# Patient Record
Sex: FEMALE | Race: BLACK OR AFRICAN AMERICAN | NOT HISPANIC OR LATINO | Employment: OTHER | URBAN - METROPOLITAN AREA
[De-identification: names, ages, dates, MRNs, and addresses within clinical notes are randomized per-mention and may not be internally consistent; named-entity substitution may affect disease eponyms.]

---

## 2017-04-06 ENCOUNTER — TELEPHONE (OUTPATIENT)
Dept: FAMILY MEDICINE | Facility: CLINIC | Age: 32
End: 2017-04-06

## 2017-04-06 NOTE — TELEPHONE ENCOUNTER
Panel Management Review      BP Readings from Last 1 Encounters:   12/19/16 128/66        Fail List measure: Pap list      Patient is due/failing the following:   PAP and PHYSICAL    Action needed:   Patient needs office visit for physical.    Type of outreach:    Phone, spoke to patient.  scheduled for 4/24/17    Questions for provider review:    None                                                                                                                                    Maggie Peres MA

## 2017-04-24 ENCOUNTER — OFFICE VISIT (OUTPATIENT)
Dept: FAMILY MEDICINE | Facility: CLINIC | Age: 32
End: 2017-04-24
Payer: COMMERCIAL

## 2017-04-24 VITALS
TEMPERATURE: 98.6 F | HEART RATE: 74 BPM | OXYGEN SATURATION: 98 % | BODY MASS INDEX: 19.37 KG/M2 | HEIGHT: 67 IN | WEIGHT: 123.4 LBS | SYSTOLIC BLOOD PRESSURE: 101 MMHG | DIASTOLIC BLOOD PRESSURE: 63 MMHG

## 2017-04-24 DIAGNOSIS — Z30.013 ENCOUNTER FOR INITIAL PRESCRIPTION OF INJECTABLE CONTRACEPTIVE: ICD-10-CM

## 2017-04-24 DIAGNOSIS — Z00.00 ENCOUNTER FOR ROUTINE ADULT HEALTH EXAMINATION WITHOUT ABNORMAL FINDINGS: Primary | ICD-10-CM

## 2017-04-24 DIAGNOSIS — Z11.3 SCREEN FOR STD (SEXUALLY TRANSMITTED DISEASE): ICD-10-CM

## 2017-04-24 DIAGNOSIS — Z12.4 SCREENING FOR MALIGNANT NEOPLASM OF CERVIX: ICD-10-CM

## 2017-04-24 LAB — BETA HCG QUAL IFA URINE: NEGATIVE

## 2017-04-24 PROCEDURE — 96372 THER/PROPH/DIAG INJ SC/IM: CPT | Performed by: FAMILY MEDICINE

## 2017-04-24 PROCEDURE — 36415 COLL VENOUS BLD VENIPUNCTURE: CPT | Performed by: FAMILY MEDICINE

## 2017-04-24 PROCEDURE — 87491 CHLMYD TRACH DNA AMP PROBE: CPT | Performed by: FAMILY MEDICINE

## 2017-04-24 PROCEDURE — 99395 PREV VISIT EST AGE 18-39: CPT | Mod: 25 | Performed by: FAMILY MEDICINE

## 2017-04-24 PROCEDURE — 87340 HEPATITIS B SURFACE AG IA: CPT | Performed by: FAMILY MEDICINE

## 2017-04-24 PROCEDURE — 84703 CHORIONIC GONADOTROPIN ASSAY: CPT | Performed by: FAMILY MEDICINE

## 2017-04-24 PROCEDURE — 87389 HIV-1 AG W/HIV-1&-2 AB AG IA: CPT | Performed by: FAMILY MEDICINE

## 2017-04-24 PROCEDURE — 87591 N.GONORRHOEAE DNA AMP PROB: CPT | Performed by: FAMILY MEDICINE

## 2017-04-24 PROCEDURE — G0145 SCR C/V CYTO,THINLAYER,RESCR: HCPCS | Performed by: FAMILY MEDICINE

## 2017-04-24 PROCEDURE — 86780 TREPONEMA PALLIDUM: CPT | Performed by: FAMILY MEDICINE

## 2017-04-24 PROCEDURE — 86803 HEPATITIS C AB TEST: CPT | Performed by: FAMILY MEDICINE

## 2017-04-24 ASSESSMENT — PAIN SCALES - GENERAL: PAINLEVEL: NO PAIN (0)

## 2017-04-24 NOTE — NURSING NOTE
"Chief Complaint   Patient presents with     Physical       Initial /63 (BP Location: Left arm, Patient Position: Chair, Cuff Size: Adult Regular)  Pulse 74  Temp 98.6  F (37  C) (Oral)  Ht 5' 6.5\" (1.689 m)  Wt 123 lb 6.4 oz (56 kg)  LMP  (LMP Unknown)  SpO2 98%  BMI 19.62 kg/m2 Estimated body mass index is 19.62 kg/(m^2) as calculated from the following:    Height as of this encounter: 5' 6.5\" (1.689 m).    Weight as of this encounter: 123 lb 6.4 oz (56 kg).  Medication Reconciliation: complete   Viviane Pepepr      "

## 2017-04-24 NOTE — PROGRESS NOTES
SUBJECTIVE:     CC: Cruz Obregon is an 31 year old woman who presents for preventive health visit.     Healthy Habits:    Do you get at least three servings of calcium containing foods daily (dairy, green leafy vegetables, etc.)? yes    Amount of exercise or daily activities, outside of work: No    Problems taking medications regularly No    Medication side effects: No    Have you had an eye exam in the past two years? yes    Do you see a dentist twice per year? no    Do you have sleep apnea, excessive snoring or daytime drowsiness? Yes         Contraception - would like to start back on depo due to every 21 days menses without it.  No menses for last 6 months since missed Depo.  Has not been sexually active for the last 3 weeks.    Today's PHQ-2 Score:   PHQ-2 ( 1999 [employer]) 4/24/2017 2/8/2016   Q1: Little interest or pleasure in doing things 0 0   Q2: Feeling down, depressed or hopeless 0 0   PHQ-2 Score 0 0     Abuse: Current or Past(Physical, Sexual or Emotional)- No  Do you feel safe in your environment - Yes    Social History   Substance Use Topics     Smoking status: Never Smoker     Smokeless tobacco: Never Used     Alcohol use No     The patient does not drink >3 drinks per day nor >7 drinks per week.    Recent Labs   Lab Test  11/29/10   1251   CHOL  194   HDL  62   LDL  111   TRIG  106   CHOLHDLRATIO  3.1       Reviewed orders with patient.  Reviewed health maintenance and updated orders accordingly - No    Mammo Decision Support:  Mammogram not appropriate for this patient based on age.    Pertinent mammograms are reviewed under the imaging tab.  History of abnormal Pap smear: NO - age 21-29 PAP every 3 years recommended    Reviewed and updated as needed this visit by clinical staff  Tobacco  Allergies  Meds  Med Hx  Surg Hx  Fam Hx  Soc Hx        Reviewed and updated as needed this visit by Provider            ROS:  C: NEGATIVE for fever, chills, change in weight  I: NEGATIVE for worrisome  "rashes, moles or lesions  E: NEGATIVE for vision changes or irritation  ENT: NEGATIVE for ear, mouth and throat problems  R: NEGATIVE for significant cough or SOB  B: NEGATIVE for masses, tenderness or discharge  CV: NEGATIVE for chest pain, palpitations or peripheral edema  GI: NEGATIVE for nausea, abdominal pain, heartburn, or change in bowel habits  : NEGATIVE for unusual urinary or vaginal symptoms. Periods are regular.  M: NEGATIVE for significant arthralgias or myalgia  N: NEGATIVE for weakness, dizziness or paresthesias  P: NEGATIVE for changes in mood or affect    Problem list, Medication list, Allergies, and Medical/Social/Surgical histories reviewed in [facility] and updated as appropriate.  OBJECTIVE:     /63 (BP Location: Left arm, Patient Position: Chair, Cuff Size: Adult Regular)  Pulse 74  Temp 98.6  F (37  C) (Oral)  Ht 5' 6.5\" (1.689 m)  Wt 123 lb 6.4 oz (56 kg)  LMP  (LMP Unknown)  SpO2 98%  BMI 19.62 kg/m2  EXAM:  GENERAL: healthy, alert and no distress  EYES: Eyes grossly normal to inspection, PERRL and conjunctivae and sclerae normal  HENT: ear canals and TM's normal, nose and mouth without ulcers or lesions  NECK: no adenopathy, no asymmetry, masses, or scars and thyroid normal to palpation  RESP: lungs clear to auscultation - no rales, rhonchi or wheezes  BREAST: normal without masses, tenderness or nipple discharge and no palpable axillary masses or adenopathy  CV: regular rate and rhythm, normal S1 S2, no S3 or S4, no murmur, click or rub, no peripheral edema and peripheral pulses strong  ABDOMEN: soft, nontender, no hepatosplenomegaly, no masses and bowel sounds normal   (female): normal female external genitalia, normal urethral meatus, vaginal mucosa pink, moist, well rugated, and normal cervix/adnexa/uterus without masses or discharge  MS: no gross musculoskeletal defects noted, no edema  SKIN: no suspicious lesions or rashes  NEURO: Normal strength and tone, mentation intact " "and speech normal  PSYCH: mentation appears normal, affect normal/bright    ASSESSMENT/PLAN:     1. Encounter for routine adult health examination without abnormal findings  Routine preventive    2. Screening for malignant neoplasm of cervix  Screening  - Pap imaged thin layer screen reflex to HPV if ASCUS - recommend age 25 - 29    3. Encounter for initial prescription of injectable contraceptive  Restart depo  - INJECTION INTRAMUSCULAR OR SUB-Q; Standing  - MEDROXYPROGESTERONE INJ  - Beta HCG qual IFA urine  - MEDROXYPROGESTERONE INJ    4. Screen for STD (sexually transmitted disease)  Screening  - Chlamydia trachomatis PCR  - Neisseria gonorrhoeae PCR  - Hepatitis C antibody  - Hepatitis B surface antigen  - HIV Antigen Antibody Combo  - Anti Treponema    COUNSELING:   Reviewed preventive health counseling, as reflected in patient instructions         reports that she has never smoked. She has never used smokeless tobacco.    Estimated body mass index is 19.62 kg/(m^2) as calculated from the following:    Height as of this encounter: 5' 6.5\" (1.689 m).    Weight as of this encounter: 123 lb 6.4 oz (56 kg).       Counseling Resources:  ATP IV Guidelines  Pooled Cohorts Equation Calculator  Breast Cancer Risk Calculator  FRAX Risk Assessment  ICSI Preventive Guidelines  Dietary Guidelines for Americans, 2010  [employer]'s MyPlate  ASA Prophylaxis  Lung CA Screening    Lisa Carney MD  [facility]  "

## 2017-04-24 NOTE — NURSING NOTE
The following medication was given:     MEDICATION: Depo Provera 150mg  ROUTE: IM  SITE: [facility]  DOSE: 1ml  LOT #: [id number]  :  Devan VILLASENOR   EXPIRATION DATE:  8/2019  NDC#: [address]-1  Given at 11:20am     Next due 7/10/2017- 7/24/2017    Evie Youssef CMA

## 2017-04-24 NOTE — MR AVS SNAPSHOT
After Visit Summary   4/24/2017    Cruz Obregon    MRN: [id number]           Patient Information     Date Of Birth          1985        Visit Information        Provider Department      4/24/2017 10:20 AM Lisa Montana MD [facility]        Today's Diagnoses     Encounter for routine adult health examination without abnormal findings    -  1    Screening for malignant neoplasm of cervix        Encounter for initial prescription of injectable contraceptive        Screen for STD (sexually transmitted disease)          Care Instructions      Preventive Health Recommendations  Female Ages 26 - 39  Yearly exam:   See your health care provider every year in order to    Review health changes.     Discuss preventive care.      Review your medicines if you your doctor has prescribed any.    Until age 30: Get a Pap test every three years (more often if you have had an abnormal result).    After age 30: Talk to your doctor about whether you should have a Pap test every 3 years or have a Pap test with HPV screening every 5 years.   You do not need a Pap test if your uterus was removed (hysterectomy) and you have not had cancer.  You should be tested each year for STDs (sexually transmitted diseases), if you're at risk.   Talk to your provider about how often to have your cholesterol checked.  If you are at risk for diabetes, you should have a diabetes test (fasting glucose).  Shots: Get a flu shot each year. Get a tetanus shot every 10 years.   Nutrition:     Eat at least 5 servings of fruits and vegetables each day.    Eat whole-grain bread, whole-wheat pasta and brown rice instead of white grains and rice.    Talk to your provider about Calcium and Vitamin D.     Lifestyle    Exercise at least 150 minutes a week (30 minutes a day, 5 days of the week). This will help you control your weight and prevent disease.    Limit alcohol to one drink per day.    No smoking.  "    Wear sunscreen to prevent skin cancer.    See your dentist every six months for an exam and cleaning.          Follow-ups after your visit        Future tests that were ordered for you today     Open Standing Orders        Priority Remaining Interval Expires Ordered    INJECTION INTRAMUSCULAR OR SUB-Q Routine 4/4 q 12 - 15 wk 4/23/2018 4/24/2017            Who to contact     If you have questions or need follow up information about today's clinic visit or your schedule please contact [facility] directly at [phone].  Normal or non-critical lab and imaging results will be communicated to you by [employer], letter or phone within 4 business days after the clinic has received the results. If you do not hear from us within 7 days, please contact the clinic through [employer] or phone. If you have a critical or abnormal lab result, we will notify you by phone as soon as possible.  Submit refill requests through [employer] or call your pharmacy and they will forward the refill request to us. Please allow 3 business days for your refill to be completed.          Additional Information About Your Visit        Publicatehart Information     [employer] gives you secure access to your electronic health record. If you see a primary care provider, you can also send messages to your care team and make appointments. If you have questions, please call your primary care clinic.  If you do not have a primary care provider, please call [phone] and they will assist you.        Care EveryWhere ID     This is your Care EveryWhere ID. This could be used by other organizations to access your [place] medical records  [id number]        Your Vitals Were     Pulse Temperature Height Last Period Pulse Oximetry BMI (Body Mass Index)    74 98.6  F (37  C) (Oral) 5' 6.5\" (1.689 m) (LMP Unknown) 98% 19.62 kg/m2       Blood Pressure from Last 3 Encounters:   04/24/17 101/63   12/19/16 128/66   07/19/16 115/75    Weight from Last 3 " Encounters:   04/24/17 123 lb 6.4 oz (56 kg)   12/19/16 122 lb 2 oz (55.4 kg)   07/19/16 118 lb 6.4 oz (53.7 kg)              We Performed the Following     Anti Treponema     Beta HCG qual IFA urine     Chlamydia trachomatis PCR     Hepatitis B surface antigen     Hepatitis C antibody     HIV Antigen Antibody Combo     MEDROXYPROGESTERONE INJ     MEDROXYPROGESTERONE INJ     Neisseria gonorrhoeae PCR     Pap imaged thin layer screen reflex to HPV if ASCUS - recommend age 25 - 29        Primary Care Provider Office Phone # Fax #    Lisa Faranatalie Carney [phone]       [facility] [address]  [facility] [address]        Thank you!     Thank you for choosing [facility]  for your care. Our goal is always to provide you with excellent care. Hearing back from our patients is one way we can continue to improve our services. Please take a few minutes to complete the written survey that you may receive in the mail after your visit with us. Thank you!             Your Updated Medication List - Protect others around you: Learn how to safely use, store and throw away your medicines at www.disposemymeds.org.          This list is accurate as of: 4/24/17 11:58 AM.  Always use your most recent med list.                   Brand Name Dispense Instructions for use    medroxyPROGESTERone 150 MG/ML injection    DEPO-PROVERA    3 mL    Inject 1 mL (150 mg) into the muscle every 3 months       metroNIDAZOLE 0.75 % vaginal gel    METROGEL    70 g    Place 1 applicator vaginally At Bedtime

## 2017-04-25 LAB
C TRACH DNA SPEC QL NAA+PROBE: NORMAL
HBV SURFACE AG SERPL QL IA: NONREACTIVE
HCV AB SERPL QL IA: NORMAL
HIV 1+2 AB+HIV1 P24 AG SERPL QL IA: NORMAL
N GONORRHOEA DNA SPEC QL NAA+PROBE: NORMAL
SPECIMEN SOURCE: NORMAL
SPECIMEN SOURCE: NORMAL
T PALLIDUM IGG+IGM SER QL: NEGATIVE

## 2017-04-26 LAB
COPATH REPORT: NORMAL
PAP: NORMAL

## 2017-04-28 NOTE — PROGRESS NOTES
MsDagoberto Obregon,    Neither hepatitis B, hepatitis C, HIV, syphilis, gonorrhea nor chlamydia were found.     Please contact the clinic if you have additional questions.  Thank you.    Sincerely,    Lisa Carney

## 2017-05-04 ENCOUNTER — MYC MEDICAL ADVICE (OUTPATIENT)
Dept: FAMILY MEDICINE | Facility: CLINIC | Age: 32
End: 2017-05-04

## 2017-05-04 DIAGNOSIS — N89.8 VAGINAL ODOR: ICD-10-CM

## 2017-05-04 DIAGNOSIS — N76.0 BACTERIAL VAGINOSIS: Primary | ICD-10-CM

## 2017-05-04 DIAGNOSIS — B96.89 BACTERIAL VAGINOSIS: Primary | ICD-10-CM

## 2017-05-04 RX ORDER — METRONIDAZOLE 7.5 MG/G
1 GEL VAGINAL AT BEDTIME
Qty: 70 G | Refills: 1 | Status: SHIPPED | OUTPATIENT
Start: 2017-05-04 | End: 2018-01-10

## 2017-05-12 ENCOUNTER — APPOINTMENT (OUTPATIENT)
Dept: OPTOMETRY | Facility: CLINIC | Age: 32
End: 2017-05-12
Payer: COMMERCIAL

## 2017-05-12 ENCOUNTER — OFFICE VISIT (OUTPATIENT)
Dept: OPTOMETRY | Facility: CLINIC | Age: 32
End: 2017-05-12
Payer: COMMERCIAL

## 2017-05-12 DIAGNOSIS — H52.03 HYPEROPIA, BILATERAL: Primary | ICD-10-CM

## 2017-05-12 PROCEDURE — 92340 FIT SPECTACLES MONOFOCAL: CPT | Performed by: OPTOMETRIST

## 2017-05-12 PROCEDURE — 92015 DETERMINE REFRACTIVE STATE: CPT | Performed by: OPTOMETRIST

## 2017-05-12 PROCEDURE — 92014 COMPRE OPH EXAM EST PT 1/>: CPT | Performed by: OPTOMETRIST

## 2017-05-12 ASSESSMENT — REFRACTION_MANIFEST
OD_SPHERE: +1.25
OS_SPHERE: +1.75
OD_SPHERE: +1.50
METHOD_AUTOREFRACTION: 1
OS_SPHERE: +1.25

## 2017-05-12 ASSESSMENT — TONOMETRY
OD_IOP_MMHG: 15
OS_IOP_MMHG: 13
IOP_METHOD: APPLANATION

## 2017-05-12 ASSESSMENT — VISUAL ACUITY
OS_CC: 20/20
OD_CC: 20/20
OS_SC: 20/20
OS_CC: 20/20
OD_SC: 20/20
OD_CC: 20/20
METHOD: SNELLEN - LINEAR
CORRECTION_TYPE: GLASSES
OD_SC: 20/20
OS_SC: 20/20

## 2017-05-12 ASSESSMENT — CUP TO DISC RATIO
OD_RATIO: 0.35
OS_RATIO: 0.35

## 2017-05-12 ASSESSMENT — EXTERNAL EXAM - LEFT EYE: OS_EXAM: NORMAL

## 2017-05-12 ASSESSMENT — REFRACTION_WEARINGRX
OD_SPHERE: +1.00
OS_SPHERE: +1.00
SPECS_TYPE: SVL

## 2017-05-12 ASSESSMENT — CONF VISUAL FIELD
OS_NORMAL: 1
METHOD: COUNTING FINGERS
OD_NORMAL: 1

## 2017-05-12 ASSESSMENT — SLIT LAMP EXAM - LIDS
COMMENTS: NORMAL
COMMENTS: NORMAL

## 2017-05-12 ASSESSMENT — EXTERNAL EXAM - RIGHT EYE: OD_EXAM: NORMAL

## 2017-05-12 NOTE — PATIENT INSTRUCTIONS
There was a small change in the prescription for your glasses.    Your eyes may be blurry at near and sensitive to light for several hours from the dilating drops.    Yearly eye exams recommended.

## 2017-05-12 NOTE — PROGRESS NOTES
Chief Complaint   Patient presents with     COMPREHENSIVE EYE EXAM         Last Eye Exam: 2015   Dilated Previously: Yes    What are you currently using to see?  Glasses - only occassionally    Vision blurry during the day/night.  Hard time adjusting from light to dark and vice versa     Distance Vision Acuity: Noticed gradual change in both eyes    Near Vision Acuity: Satisfied with vision while reading  unaided    Eye Comfort: watery  Do you use eye drops? : No  Occupation or Hobbies: [employer]            Medical, surgical and family histories reviewed and updated 5/12/2017.       OBJECTIVE: See Ophthalmology exam    ASSESSMENT:    ICD-10-CM    1. Hyperopia, bilateral H52.03       PLAN:     Patient Instructions   There was a small change in the prescription for your glasses.    Your eyes may be blurry at near and sensitive to light for several hours from the dilating drops.    Yearly eye exams recommended.

## 2017-05-12 NOTE — MR AVS SNAPSHOT
After Visit Summary   5/12/2017    Cruz Obregon    MRN: [id number]           Patient Information     Date Of Birth          1985        Visit Information        Provider Department      5/12/2017 1:30 PM Eva Lomeli OD [facility]        Today's Diagnoses     Hyperopia, bilateral    -  1      Care Instructions    There was a small change in the prescription for your glasses.    Your eyes may be blurry at near and sensitive to light for several hours from the dilating drops.    Yearly eye exams recommended.            Follow-ups after your visit        Who to contact     If you have questions or need follow up information about today's clinic visit or your schedule please contact [facility] directly at [phone].  Normal or non-critical lab and imaging results will be communicated to you by [employer] System[employer]art, letter or phone within 4 business days after the clinic has received the results. If you do not hear from us within 7 days, please contact the clinic through [employer] or phone. If you have a critical or abnormal lab result, we will notify you by phone as soon as possible.  Submit refill requests through [employer] or call your pharmacy and they will forward the refill request to us. Please allow 3 business days for your refill to be completed.          Additional Information About Your Visit        MyChart Information     [employer] gives you secure access to your electronic health record. If you see a primary care provider, you can also send messages to your care team and make appointments. If you have questions, please call your primary care clinic.  If you do not have a primary care provider, please call [phone] and they will assist you.        Care EveryWhere ID     This is your Care EveryWhere ID. This could be used by other organizations to access your [place] medical records  [id number]        Your Vitals Were     Last Period                    (LMP Unknown)            Blood Pressure from Last 3 Encounters:   04/24/17 101/63   12/19/16 128/66   07/19/16 115/75    Weight from Last 3 Encounters:   04/24/17 56 kg (123 lb 6.4 oz)   12/19/16 55.4 kg (122 lb 2 oz)   07/19/16 53.7 kg (118 lb 6.4 oz)              We Performed the Following     EYE EXAM (SIMPLE-NONBILLABLE)     REFRACTION        Primary Care Provider Office Phone # Fax #    Lisa Fara Carney [phone]       [facility] [address]  [facility] [address]        Thank you!     Thank you for choosing [facility]  for your care. Our goal is always to provide you with excellent care. Hearing back from our patients is one way we can continue to improve our services. Please take a few minutes to complete the written survey that you may receive in the mail after your visit with us. Thank you!             Your Updated Medication List - Protect others around you: Learn how to safely use, store and throw away your medicines at www.disposemymeds.org.          This list is accurate as of: 5/12/17  2:13 PM.  Always use your most recent med list.                   Brand Name Dispense Instructions for use    medroxyPROGESTERone 150 MG/ML injection    DEPO-PROVERA    3 mL    Inject 1 mL (150 mg) into the muscle every 3 months       metroNIDAZOLE 0.75 % vaginal gel    METROGEL    70 g    Place 1 applicator (5 g) vaginally At Bedtime

## 2017-06-06 ENCOUNTER — MYC MEDICAL ADVICE (OUTPATIENT)
Dept: FAMILY MEDICINE | Facility: CLINIC | Age: 32
End: 2017-06-06

## 2017-07-12 ENCOUNTER — ALLIED HEALTH/NURSE VISIT (OUTPATIENT)
Dept: NURSING | Facility: CLINIC | Age: 32
End: 2017-07-12
Payer: COMMERCIAL

## 2017-07-12 VITALS
HEART RATE: 68 BPM | WEIGHT: 117.9 LBS | BODY MASS INDEX: 18.74 KG/M2 | SYSTOLIC BLOOD PRESSURE: 114 MMHG | DIASTOLIC BLOOD PRESSURE: 68 MMHG

## 2017-07-12 DIAGNOSIS — Z30.42 ENCOUNTER FOR SURVEILLANCE OF INJECTABLE CONTRACEPTIVE: Primary | ICD-10-CM

## 2017-07-12 PROCEDURE — 99207 ZZC NO CHARGE LOS: CPT

## 2017-07-12 PROCEDURE — 96372 THER/PROPH/DIAG INJ SC/IM: CPT

## 2017-07-12 NOTE — PROGRESS NOTES
"Chief Complaint   Patient presents with     Imm/Inj     Depo provera        Initial /68 (BP Location: Right arm, Patient Position: Chair, Cuff Size: Adult Regular)  Pulse 68  Wt 117 lb 14.4 oz (53.5 kg)  BMI 18.74 kg/m2 Estimated body mass index is 18.74 kg/(m^2) as calculated from the following:    Height as of 4/24/17: 5' 6.5\" (1.689 m).    Weight as of this encounter: 117 lb 14.4 oz (53.5 kg).  Medication Reconciliation: unable or not appropriate to perform    The following medication was given:     MEDICATION: Depo Provera 150mg  ROUTE: IM  SITE: [facility] - Gluteus  DOSE: 1ml  LOT #: [id number]  :  [employer]   EXPIRATION DATE:  01/2018  NDC#: [address]-0        Given 1:37am, next due 9/27/17 to 10/11/2017.     Evie Youssef CMA     "

## 2017-12-18 ENCOUNTER — OFFICE VISIT (OUTPATIENT)
Dept: FAMILY MEDICINE | Facility: CLINIC | Age: 32
End: 2017-12-18
Payer: COMMERCIAL

## 2017-12-18 VITALS
HEART RATE: 90 BPM | SYSTOLIC BLOOD PRESSURE: 115 MMHG | BODY MASS INDEX: 18.83 KG/M2 | OXYGEN SATURATION: 97 % | WEIGHT: 120 LBS | DIASTOLIC BLOOD PRESSURE: 76 MMHG | HEIGHT: 67 IN

## 2017-12-18 DIAGNOSIS — Z30.42 ENCOUNTER FOR SURVEILLANCE OF INJECTABLE CONTRACEPTIVE: Primary | ICD-10-CM

## 2017-12-18 DIAGNOSIS — G47.10 HYPERSOMNIA: ICD-10-CM

## 2017-12-18 LAB — BETA HCG QUAL IFA URINE: NEGATIVE

## 2017-12-18 PROCEDURE — 84703 CHORIONIC GONADOTROPIN ASSAY: CPT | Performed by: FAMILY MEDICINE

## 2017-12-18 PROCEDURE — 96372 THER/PROPH/DIAG INJ SC/IM: CPT | Performed by: FAMILY MEDICINE

## 2017-12-18 PROCEDURE — 99213 OFFICE O/P EST LOW 20 MIN: CPT | Mod: 25 | Performed by: FAMILY MEDICINE

## 2017-12-18 RX ORDER — MEDROXYPROGESTERONE ACETATE 150 MG/ML
150 INJECTION, SUSPENSION INTRAMUSCULAR
Qty: 3 ML | Refills: 3 | OUTPATIENT
Start: 2017-12-18 | End: 2018-04-16

## 2017-12-18 ASSESSMENT — PAIN SCALES - GENERAL: PAINLEVEL: NO PAIN (0)

## 2017-12-18 NOTE — NURSING NOTE
BP: 115/76    LAST PAP/EXAM:   Lab Results   Component Value Date    PAP NIL 04/24/2017     URINE HCG:negative    The following medication was given:     MEDICATION: Depo Provera 150mg  ROUTE: IM  SITE: [facility] - Gluteus  : [employer]  LOT #: [id number]  EXP:2/2020  NDC: [address]-6  NEXT INJECTION DUE: 3/5/18 - 3/19/18   Provider: NESTOR Wong MA

## 2017-12-18 NOTE — PATIENT INSTRUCTIONS
At [facility], we strive to deliver an exceptional experience to you, every time we see you.  If you receive a survey in the mail, please send us back your thoughts. We really do value your feedback.    Based on your medical history, these are the current health maintenance/preventive care services that you are due for (some may have been done at this visit.)  Health Maintenance Due   Topic Date Due     INFLUENZA VACCINE (SYSTEM ASSIGNED)  09/01/2017         Suggested websites for health information:  Www.[employer].org : Up to date and easily searchable information on multiple topics.  Www.medlineplus.gov : medication info, interactive tutorials, watch real surgeries online  Www.familydoctor.org : good info from the Academy of Family Physicians  Www.cdc.gov : public health info, travel advisories, epidemics (H1N1)  Www.aap.org : children's health info, normal development, vaccinations  Www.health.[facility].[place].us : [place] dept of health, public health issues in [place], N1N1    Your care team:                            Family Medicine Internal Medicine   MD Josh Liu MD Shantel Branch-Fleming, MD Katya Georgiev PA-C Nam Ho, MD Pediatrics   Antonio Alexandre PAVICTORIANO Spicer, CNP Karen Wilkins APRN CNP   MD Kim Curiel MD Deborah Mielke, MD Kim Thein, APRN CNP      Clinic hours: Monday - Thursday 7 am-7 pm; Fridays 7 am-5 pm.   Urgent care: Monday - Friday 11 am-9 pm; Saturday and Sunday 9 am-5 pm.  Pharmacy : Monday -Thursday 8 am-8 pm; Friday 8 am-6 pm; Saturday and Sunday 9 am-5 pm.     Clinic: [phone]   Pharmacy: [phone]

## 2017-12-18 NOTE — MR AVS SNAPSHOT
After Visit Summary   12/18/2017    Cruz Obregon    MRN: [id number]           Patient Information     Date Of Birth          1985        Visit Information        Provider Department      12/18/2017 1:00 PM Lisa Montana MD [facility]        Today's Diagnoses     Encounter for surveillance of injectable contraceptive    -  1    Hypersomnia          Care Instructions    At [facility], we strive to deliver an exceptional experience to you, every time we see you.  If you receive a survey in the mail, please send us back your thoughts. We really do value your feedback.    Based on your medical history, these are the current health maintenance/preventive care services that you are due for (some may have been done at this visit.)  Health Maintenance Due   Topic Date Due     INFLUENZA VACCINE (SYSTEM ASSIGNED)  09/01/2017         Suggested websites for health information:  Www."[employer]" : Up to date and easily searchable information on multiple topics.  Www.wmbly.gov : medication info, interactive tutorials, watch real surgeries online  Www.familydoctor.org : good info from the Academy of Family Physicians  Www.cdc.gov : public health info, travel advisories, epidemics (H1N1)  Www.aap.org : children's health info, normal development, vaccinations  Www.health.[facility].[place].us : [place] dept of health, public health issues in [place], N1N1    Your care team:                            Family Medicine Internal Medicine   MD Josh Liu MD Shantel Branch-Fleming, MD Katya Georgiev PA-C Nam Ho, MD Pediatrics   ZA Porter, RHIANNON Wilkins APRN CNP   MD Kim Curiel MD Deborah Mielke, MD Kim Thein, APRN CNP      Clinic hours: Monday - Thursday 7 am-7 pm; Fridays 7 am-5 pm.   Urgent care: Monday - Friday 11 am-9 pm; Saturday and Sunday 9 am-5 pm.  Pharmacy : Monday -Thursday 8  "am-8 pm; Friday 8 am-6 pm; Saturday and Sunday 9 am-5 pm.     Clinic: [phone]   Pharmacy: [phone]                                   Follow-ups after your visit        Who to contact     If you have questions or need follow up information about today's clinic visit or your schedule please contact [facility] directly at [phone].  Normal or non-critical lab and imaging results will be communicated to you by Gameleonhart, letter or phone within 4 business days after the clinic has received the results. If you do not hear from us within 7 days, please contact the clinic through [facility] [employer] or phone. If you have a critical or abnormal lab result, we will notify you by phone as soon as possible.  Submit refill requests through [facility] [employer] or call your pharmacy and they will forward the refill request to us. Please allow 3 business days for your refill to be completed.          Additional Information About Your Visit        [employer] Information     [facility] gives you secure access to your electronic health record. If you see a primary care provider, you can also send messages to your care team and make appointments. If you have questions, please call your primary care clinic.  If you do not have a primary care provider, please call [phone] and they will assist you.        Care EveryWhere ID     This is your Care EveryWhere ID. This could be used by other organizations to access your [place] medical records  [id number]        Your Vitals Were     Pulse Height Pulse Oximetry BMI (Body Mass Index)          90 5' 6.5\" (1.689 m) 97% 19.08 kg/m2         Blood Pressure from Last 3 Encounters:   12/18/17 115/76   07/12/17 114/68   04/24/17 101/63    Weight from Last 3 Encounters:   12/18/17 120 lb (54.4 kg)   07/12/17 117 lb 14.4 oz (53.5 kg)   04/24/17 123 lb 6.4 oz (56 kg)              We Performed the Following     Beta HCG qual IFA urine     INJECTION INTRAMUSCULAR OR SUB-Q     " Medroxyprogesterone inj/1mg (Depo Provera J-Code)          Where to get your medicines      Some of these will need a paper prescription and others can be bought over the counter.  Ask your nurse if you have questions.     You don't need a prescription for these medications     medroxyPROGESTERone 150 MG/ML injection          Primary Care Provider Office Phone # Fax #    Lisa Fara Carney [phone]       [address] [place]  [facility] [address]        Equal Access to Services     [facility]: Hadii aad ku hadasho Soomaali, waaxda luqadaha, qaybta kaalmada adeegyada, waxay idiin haybelinda spring . So [facility] [phone].    ATENCIÓN: Si habla español, tiene a herring disposición servicios gratuitos de asistencia lingüística. Llame al [phone].    We comply with applicable federal civil rights laws and [place] laws. We do not discriminate on the basis of race, color, national origin, age, disability, sex, sexual orientation, or gender identity.            Thank you!     Thank you for choosing [facility]  for your care. Our goal is always to provide you with excellent care. Hearing back from our patients is one way we can continue to improve our services. Please take a few minutes to complete the written survey that you may receive in the mail after your visit with us. Thank you!             Your Updated Medication List - Protect others around you: Learn how to safely use, store and throw away your medicines at www.disposemymeds.org.          This list is accurate as of: 12/18/17  1:37 PM.  Always use your most recent med list.                   Brand Name Dispense Instructions for use Diagnosis    medroxyPROGESTERone 150 MG/ML injection    DEPO-PROVERA    3 mL    Inject 1 mL (150 mg) into the muscle every 3 months    Encounter for surveillance of injectable contraceptive       metroNIDAZOLE 0.75 % vaginal gel    METROGEL    70 g    Place 1  applicator (5 g) vaginally At Bedtime    Vaginal odor

## 2017-12-18 NOTE — PROGRESS NOTES
SUBJECTIVE:   Cruz Obregon is a 32 year old female who presents to clinic today for the following health issues:      Depo Injection. Late. Last Depo 7/2017 - here to restart.  Doesn't want pills and doesn't want anything implanted.    Hypersomnia - using melatonin for more consistent sleeps and feeling better.    Problem list and histories reviewed & adjusted, as indicated.  Additional history: as documented    Patient Active Problem List   Diagnosis     CARDIOVASCULAR SCREENING; LDL GOAL LESS THAN 160     Mild Mitral insufficiency     Mild Tricuspid insufficiency     Bacterial vaginosis     Elevated antinuclear antibody (CAMILO) level     Tension headache     Hypersomnia     Joint pain     Paresthesias, right hand     Chronic daily headache     ASCUS with positive high risk HPV cervical     Cervical high risk HPV (human papillomavirus) test positive - 66     Frequent menstruation     Chest pain     Low back pain     Esophageal reflux (GERD)     Past Surgical History:   Procedure Laterality Date     NO HISTORY OF SURGERY         Social History   Substance Use Topics     Smoking status: Never Smoker     Smokeless tobacco: Never Used     Alcohol use No     Family History   Problem Relation Age of Onset     Asthma Mother      Alcohol/Drug Mother      Allergies Mother      Hypertension Father      GASTROINTESTINAL DISEASE Father      Alcohol/Drug Father      Lipids Father      Hearing Loss Father      Neurologic Disorder Father      migraine     DIABETES Maternal Grandmother      CEREBROVASCULAR DISEASE Maternal Grandmother      CANCER Paternal Grandfather      Asthma Sister      Alzheimer Disease Maternal Grandfather      Cardiovascular Other      Neurologic Disorder Paternal Grandmother      migraine     Thyroid Disease No family hx of      Glaucoma No family hx of      Macular Degeneration No family hx of              Reviewed and updated as needed this visit by clinical staffTobacco  Allergies  Meds  Med Hx   "Surg Hx  Fam Hx  Soc Hx      Reviewed and updated as needed this visit by Provider  Allergies  Meds  Problems         ROS:  Constitutional, HEENT, cardiovascular, pulmonary, gi and gu systems are negative, except as otherwise noted.      OBJECTIVE:   /76 (BP Location: Left arm, Patient Position: Chair, Cuff Size: Adult Regular)  Pulse 90  Ht 5' 6.5\" (1.689 m)  Wt 120 lb (54.4 kg)  SpO2 97%  BMI 19.08 kg/m2  Body mass index is 19.08 kg/(m^2).  GENERAL: healthy, alert and no distress  NECK: no adenopathy, no asymmetry, masses, or scars and thyroid normal to palpation  RESP: lungs clear to auscultation - no rales, rhonchi or wheezes  CV: regular rate and rhythm, normal S1 S2, no S3 or S4, no murmur, click or rub, no peripheral edema and peripheral pulses strong  ABDOMEN: soft, nontender, no hepatosplenomegaly, no masses and bowel sounds normal  MS: no gross musculoskeletal defects noted, no edema    Diagnostic Test Results:  Results for orders placed or performed in visit on 12/18/17 (from the past 24 hour(s))   Beta HCG qual IFA urine   Result Value Ref Range    Beta HCG Qual IFA Urine Negative NEG^Negative          ASSESSMENT/PLAN:     1. Encounter for surveillance of injectable contraceptive  Discussed options and patient decided to restart Depo.  - Beta HCG qual IFA urine  - medroxyPROGESTERone (DEPO-PROVERA) 150 MG/ML injection; Inject 1 mL (150 mg) into the muscle every 3 months  Dispense: 3 mL; Refill: 3  - INJECTION INTRAMUSCULAR OR SUB-Q  - Medroxyprogesterone inj/1mg (Depo Provera J-Code)    2. Hypersomnia  Continue current process.      Lisa Carney MD  [facility]  "

## 2017-12-18 NOTE — LETTER
[facility]  [address] [place] [address]  [phone]    Depo Provera (MedroxyProgresterone) Reminder for:    Cruz Obregon was given the Depo-Provera (MedroxyProgesterone) contraception injection on: 12/18/17             Next injection is due:  3/5/18 - 3/19/18   Primary Provider:  GUSTAVO LIN Mai, MA

## 2018-01-09 ENCOUNTER — MYC MEDICAL ADVICE (OUTPATIENT)
Dept: FAMILY MEDICINE | Facility: CLINIC | Age: 33
End: 2018-01-09

## 2018-01-09 DIAGNOSIS — N89.8 VAGINAL ODOR: ICD-10-CM

## 2018-01-10 ENCOUNTER — MYC MEDICAL ADVICE (OUTPATIENT)
Dept: FAMILY MEDICINE | Facility: CLINIC | Age: 33
End: 2018-01-10

## 2018-01-10 DIAGNOSIS — N89.8 VAGINAL ODOR: ICD-10-CM

## 2018-01-10 RX ORDER — METRONIDAZOLE 7.5 MG/G
1 GEL VAGINAL AT BEDTIME
Qty: 70 G | Refills: 1 | Status: SHIPPED | OUTPATIENT
Start: 2018-01-10 | End: 2018-12-17

## 2018-01-10 NOTE — TELEPHONE ENCOUNTER
Requested Prescriptions   Pending Prescriptions Disp Refills     metroNIDAZOLE (METROGEL) 0.75 % vaginal gel 70 g 1     Sig: Place 1 applicator (5 g) vaginally At Bedtime    There is no refill protocol information for this order        Routing refill request to provider for review/approval because:  Drug not on the [facility] refill protocol   A break in medication    Gallo Barnett RN, BSN

## 2018-01-23 ENCOUNTER — MYC MEDICAL ADVICE (OUTPATIENT)
Dept: FAMILY MEDICINE | Facility: CLINIC | Age: 33
End: 2018-01-23

## 2018-01-23 ENCOUNTER — OFFICE VISIT (OUTPATIENT)
Dept: OPTOMETRY | Facility: CLINIC | Age: 33
End: 2018-01-23
Payer: COMMERCIAL

## 2018-01-23 DIAGNOSIS — H52.03 HYPERMETROPIA OF BOTH EYES: ICD-10-CM

## 2018-01-23 DIAGNOSIS — H53.8 BLURRED VISION: Primary | ICD-10-CM

## 2018-01-23 PROCEDURE — 99213 OFFICE O/P EST LOW 20 MIN: CPT | Performed by: OPTOMETRIST

## 2018-01-23 ASSESSMENT — VISUAL ACUITY
METHOD: SNELLEN - LINEAR
OD_SC: 20/20
OS_SC: 20/20

## 2018-01-23 ASSESSMENT — REFRACTION_WEARINGRX
OS_SPHERE: +1.25
SPECS_TYPE: DIDNT BRING
OD_SPHERE: +1.25

## 2018-01-23 ASSESSMENT — EXTERNAL EXAM - RIGHT EYE: OD_EXAM: NORMAL

## 2018-01-23 ASSESSMENT — CUP TO DISC RATIO
OS_RATIO: 0.35
OD_RATIO: 0.35

## 2018-01-23 ASSESSMENT — REFRACTION_MANIFEST
OD_CYLINDER: SPHERE
OS_CYLINDER: SPHERE
OD_SPHERE: +1.25
OS_SPHERE: +1.25

## 2018-01-23 ASSESSMENT — SLIT LAMP EXAM - LIDS
COMMENTS: NORMAL
COMMENTS: NORMAL

## 2018-01-23 ASSESSMENT — EXTERNAL EXAM - LEFT EYE: OS_EXAM: NORMAL

## 2018-01-23 NOTE — PATIENT INSTRUCTIONS
No change in eyeglass prescription.    You are becoming more dependent on your glasses and there is nothing wrong with your eyes.    Schedule diabetic check with your PCP- to rule out an increase in blood sugar.    Recommend annual eye exams. You are due 5/18.    Jarret Matson, OD

## 2018-01-23 NOTE — PROGRESS NOTES
Chief Complaint   Patient presents with     Eye Problem     blurry vision x 3 weeks       Patient started new medicine 1 month ago Apetamin. Vision started getting blurry up close x 3 weeks ago. Has to wear glasses every day now- whereas it used to be occasionally.    Medical, surgical and family histories reviewed and updated 1/23/2018.       OBJECTIVE: See Ophthalmology exam    ASSESSMENT:    ICD-10-CM    1. Blurred vision H53.8    2. 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    You are becoming more dependent on your glasses and there is nothing wrong with your eyes.    Schedule diabetic check with your PCP- to rule out an increase in blood sugar.    Recommend annual eye exams. You are due 5/18.    Jarret Matson, OD

## 2018-01-23 NOTE — MR AVS SNAPSHOT
After Visit Summary   1/23/2018    Cruz Obregon    MRN: [id number]           Patient Information     Date Of Birth          1985        Visit Information        Provider Department      1/23/2018 12:20 PM Jarret Matson OD [facility]        Today's Diagnoses     Blurred vision    -  1    Hypermetropia of both eyes          Care Instructions    No change in eyeglass prescription.    You are becoming more dependent on your glasses and there is nothing wrong with your eyes.    Schedule diabetic check with your PCP- to rule out an increase in blood sugar.    Recommend annual eye exams. You are due 5/18.    Jarret Matson OD            Follow-ups after your visit        Who to contact     If you have questions or need follow up information about today's clinic visit or your schedule please contact [facility] directly at [phone].  Normal or non-critical lab and imaging results will be communicated to you by [employer], letter or phone within 4 business days after the clinic has received the results. If you do not hear from us within 7 days, please contact the clinic through [employer] or phone. If you have a critical or abnormal lab result, we will notify you by phone as soon as possible.  Submit refill requests through [employer] or call your pharmacy and they will forward the refill request to us. Please allow 3 business days for your refill to be completed.          Additional Information About Your Visit        MyChart Information     [employer] gives you secure access to your electronic health record. If you see a primary care provider, you can also send messages to your care team and make appointments. If you have questions, please call your primary care clinic.  If you do not have a primary care provider, please call [phone] and they will assist you.        Care EveryWhere ID     This is your Care EveryWhere ID. This could be used by other organizations to  access your [place] medical records  [id number]         Blood Pressure from Last 3 Encounters:   12/18/17 115/76   07/12/17 114/68   04/24/17 101/63    Weight from Last 3 Encounters:   12/18/17 54.4 kg (120 lb)   07/12/17 53.5 kg (117 lb 14.4 oz)   04/24/17 56 kg (123 lb 6.4 oz)              Today, you had the following     No orders found for display       Primary Care Provider Office Phone # Fax #    Lisa Fannatalie Carney [phone]       [address]  [facility] [address]        Equal Access to Services     [facility] : Hadii aad ku hadasho Soomaali, waaxda luqadaha, qaybta kaalmada adeegyada, laz spring . So [facility] [phone].    ATENCIÓN: Si habla español, tiene a herring disposición servicios gratuitos de asistencia lingüística. Llame al [phone].    We comply with applicable federal civil rights laws and [place] laws. We do not discriminate on the basis of race, color, national origin, age, disability, sex, sexual orientation, or gender identity.            Thank you!     Thank you for choosing [facility]  for your care. Our goal is always to provide you with excellent care. Hearing back from our patients is one way we can continue to improve our services. Please take a few minutes to complete the written survey that you may receive in the mail after your visit with us. Thank you!             Your Updated Medication List - Protect others around you: Learn how to safely use, store and throw away your medicines at www.disposemymeds.org.          This list is accurate as of: 1/23/18  1:26 PM.  Always use your most recent med list.                   Brand Name Dispense Instructions for use Diagnosis    medroxyPROGESTERone 150 MG/ML injection    DEPO-PROVERA    3 mL    Inject 1 mL (150 mg) into the muscle every 3 months    Encounter for surveillance of injectable contraceptive       metroNIDAZOLE 0.75 % vaginal gel     METROGEL    70 g    Place 1 applicator (5 g) vaginally At Bedtime    Vaginal odor

## 2018-01-23 NOTE — LETTER
1/23/2018         RE: Cruz Obregon  [address]  [facility] [address]        Dear Colleague,    Thank you for referring your patient, Cruz Obregon, to the [facility]. Please see a copy of my visit note below.    Chief Complaint   Patient presents with     Eye Problem     blurry vision x 3 weeks       Patient started new medicine 1 month ago Apetamin. Vision started getting blurry up close x 3 weeks ago. Has to wear glasses every day now- whereas it used to be occasionally.    Medical, surgical and family histories reviewed and updated 1/23/2018.       OBJECTIVE: See Ophthalmology exam    ASSESSMENT:    ICD-10-CM    1. Blurred vision H53.8    2. 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    You are becoming more dependent on your glasses and there is nothing wrong with your eyes.    Schedule diabetic check with your PCP- to rule out an increase in blood sugar.    Recommend annual eye exams. You are due 5/18.    Jarret Matson, OD           Again, thank you for allowing me to participate in the care of your patient.        Sincerely,        Jarret Matson, OD

## 2018-02-05 DIAGNOSIS — H53.8 BLURRED VISION: ICD-10-CM

## 2018-02-05 LAB — HBA1C MFR BLD: 4.9 % (ref 4.3–6)

## 2018-02-05 PROCEDURE — 36415 COLL VENOUS BLD VENIPUNCTURE: CPT | Performed by: FAMILY MEDICINE

## 2018-02-05 PROCEDURE — 83036 HEMOGLOBIN GLYCOSYLATED A1C: CPT | Performed by: FAMILY MEDICINE

## 2018-02-05 NOTE — PROGRESS NOTES
Ms. Obregon,    No signs of diabetes.    Please contact the clinic if you have additional questions.  Thank you.    Sincerely,    Lisa Carney

## 2018-04-16 ENCOUNTER — OFFICE VISIT (OUTPATIENT)
Dept: FAMILY MEDICINE | Facility: CLINIC | Age: 33
End: 2018-04-16
Payer: COMMERCIAL

## 2018-04-16 VITALS
TEMPERATURE: 98.6 F | DIASTOLIC BLOOD PRESSURE: 71 MMHG | SYSTOLIC BLOOD PRESSURE: 113 MMHG | HEART RATE: 83 BPM | BODY MASS INDEX: 22.26 KG/M2 | WEIGHT: 141.8 LBS | HEIGHT: 67 IN | RESPIRATION RATE: 18 BRPM | OXYGEN SATURATION: 98 %

## 2018-04-16 DIAGNOSIS — Z30.42 ENCOUNTER FOR SURVEILLANCE OF INJECTABLE CONTRACEPTIVE: Primary | ICD-10-CM

## 2018-04-16 DIAGNOSIS — Z30.013 ENCOUNTER FOR INITIAL PRESCRIPTION OF INJECTABLE CONTRACEPTIVE: ICD-10-CM

## 2018-04-16 LAB — BETA HCG QUAL IFA URINE: NEGATIVE

## 2018-04-16 PROCEDURE — 96372 THER/PROPH/DIAG INJ SC/IM: CPT | Performed by: FAMILY MEDICINE

## 2018-04-16 PROCEDURE — 99213 OFFICE O/P EST LOW 20 MIN: CPT | Mod: 25 | Performed by: FAMILY MEDICINE

## 2018-04-16 PROCEDURE — 84703 CHORIONIC GONADOTROPIN ASSAY: CPT | Performed by: FAMILY MEDICINE

## 2018-04-16 RX ORDER — MEDROXYPROGESTERONE ACETATE 150 MG/ML
150 INJECTION, SUSPENSION INTRAMUSCULAR
Qty: 3 ML | Refills: 3 | OUTPATIENT
Start: 2018-04-16 | End: 2018-12-17

## 2018-04-16 ASSESSMENT — PAIN SCALES - GENERAL: PAINLEVEL: NO PAIN (0)

## 2018-04-16 NOTE — MR AVS SNAPSHOT
After Visit Summary   4/16/2018    Cruz Obregon    MRN: [id number]           Patient Information     Date Of Birth          1985        Visit Information        Provider Department      4/16/2018 2:00 PM Lisa Montana MD [facility]        Today's Diagnoses     Encounter for surveillance of injectable contraceptive    -  1    Encounter for initial prescription of injectable contraceptive           Follow-ups after your visit        Your next 10 appointments already scheduled     Jul 03, 2018 12:00 PM CDT   Nurse Only with [facility]   [facility] ([facility])    [address] [place] [id number] [phone]              Who to contact     If you have questions or need follow up information about today's clinic visit or your schedule please contact [facility] directly at [phone].  Normal or non-critical lab and imaging results will be communicated to you by MyChart, letter or phone within 4 business days after the clinic has received the results. If you do not hear from us within 7 days, please contact the clinic through [facility] or phone. If you have a critical or abnormal lab result, we will notify you by phone as soon as possible.  Submit refill requests through [employer] or call your pharmacy and they will forward the refill request to us. Please allow 3 business days for your refill to be completed.          Additional Information About Your Visit        MyChart Information     [employer] gives you secure access to your electronic health record. If you see a primary care provider, you can also send messages to your care team and make appointments. If you have questions, please call your primary care clinic.  If you do not have a primary care provider, please call [phone] and they will assist you.        Care EveryWhere ID     This is your Care EveryWhere ID. This  "could be used by other organizations to access your [place] medical records  [id number]        Your Vitals Were     Pulse Temperature Respirations Height Last Period Pulse Oximetry    83 98.6  F (37  C) (Oral) 18 5' 6.53\" (1.69 m) 04/15/2018 (Exact Date) 98%    Breastfeeding? BMI (Body Mass Index)                No 22.52 kg/m2           Blood Pressure from Last 3 Encounters:   04/16/18 113/71   12/18/17 115/76   07/12/17 114/68    Weight from Last 3 Encounters:   04/16/18 141 lb 12.8 oz (64.3 kg)   12/18/17 120 lb (54.4 kg)   07/12/17 117 lb 14.4 oz (53.5 kg)              We Performed the Following     Beta HCG Qual, Urine - [facility] and [place] ([id number])     INJECTION INTRAMUSCULAR OR SUB-Q          Where to get your medicines      Some of these will need a paper prescription and others can be bought over the counter.  Ask your nurse if you have questions.     You don't need a prescription for these medications     medroxyPROGESTERone 150 MG/ML injection          Primary Care Provider Office Phone # Fax #    Lisa Fara Carney [phone]       [address]  [facility] [address]        Equal Access to Services     [facility] : Hadii zhang ku hadasho Soomaali, waaxda luqadaha, qaybta kaalmada adeegyada, laz kaur haybelinda spring . So [facility] [phone].    ATENCIÓN: Si habla español, tiene a herring disposición servicios gratuitos de asistencia lingüística. Cheli al [phone].    We comply with applicable federal civil rights laws and [place] laws. We do not discriminate on the basis of race, color, national origin, age, disability, sex, sexual orientation, or gender identity.            Thank you!     Thank you for choosing [facility]  for your care. Our goal is always to provide you with excellent care. Hearing back from our patients is one way we can continue to improve our services. Please take a few minutes to complete the written " survey that you may receive in the mail after your visit with us. Thank you!             Your Updated Medication List - Protect others around you: Learn how to safely use, store and throw away your medicines at www.disposemymeds.org.          This list is accurate as of 4/16/18  3:10 PM.  Always use your most recent med list.                   Brand Name Dispense Instructions for use Diagnosis    medroxyPROGESTERone 150 MG/ML injection    DEPO-PROVERA    3 mL    Inject 1 mL (150 mg) into the muscle every 3 months    Encounter for surveillance of injectable contraceptive       metroNIDAZOLE 0.75 % vaginal gel    METROGEL    70 g    Place 1 applicator (5 g) vaginally At Bedtime    Vaginal odor

## 2018-04-16 NOTE — PROGRESS NOTES
SUBJECTIVE:   Cruz Obregon is a 32 year old female who presents to clinic today for the following health issues:    Overdue for Depo shot - no pregnancy concerns but likes to be prepared.  Not interested in other methods for now.    Problem list and histories reviewed & adjusted, as indicated.  Additional history: as documented    Patient Active Problem List   Diagnosis     CARDIOVASCULAR SCREENING; LDL GOAL LESS THAN 160     Mild Mitral insufficiency     Mild Tricuspid insufficiency     Bacterial vaginosis     Elevated antinuclear antibody (CAMILO) level     Tension headache     Hypersomnia     Joint pain     Paresthesias, right hand     Chronic daily headache     ASCUS with positive high risk HPV cervical     Cervical high risk HPV (human papillomavirus) test positive - 66     Frequent menstruation     Chest pain     Low back pain     Esophageal reflux (GERD)     Past Surgical History:   Procedure Laterality Date     NO HISTORY OF SURGERY         Social History   Substance Use Topics     Smoking status: Never Smoker     Smokeless tobacco: Never Used     Alcohol use No     Family History   Problem Relation Age of Onset     Asthma Mother      Alcohol/Drug Mother      Allergies Mother      Hypertension Father      GASTROINTESTINAL DISEASE Father      Alcohol/Drug Father      Lipids Father      Hearing Loss Father      Neurologic Disorder Father      migraine     DIABETES Maternal Grandmother      CEREBROVASCULAR DISEASE Maternal Grandmother      CANCER Paternal Grandfather      Asthma Sister      Alzheimer Disease Maternal Grandfather      Cardiovascular Other      Neurologic Disorder Paternal Grandmother      migraine     Thyroid Disease No family hx of      Glaucoma No family hx of      Macular Degeneration No family hx of            Reviewed and updated as needed this visit by clinical staff  Tobacco  Allergies  Meds  Problems       Reviewed and updated as needed this visit by Provider  Allergies  Meds   "Problems         ROS:  Constitutional, HEENT, cardiovascular, pulmonary, gi and gu systems are negative, except as otherwise noted.    OBJECTIVE:     /71 (BP Location: Left arm, Patient Position: Chair, Cuff Size: Adult Large)  Pulse 83  Temp 98.6  F (37  C) (Oral)  Resp 18  Ht 5' 6.53\" (1.69 m)  Wt 141 lb 12.8 oz (64.3 kg)  LMP 04/15/2018 (Exact Date)  SpO2 98%  Breastfeeding? No  BMI 22.52 kg/m2  Body mass index is 22.52 kg/(m^2).  GENERAL: healthy, alert and no distress  NECK: no adenopathy, no asymmetry, masses, or scars and thyroid normal to palpation  RESP: lungs clear to auscultation - no rales, rhonchi or wheezes  CV: regular rate and rhythm, normal S1 S2, no S3 or S4, no murmur, click or rub, no peripheral edema and peripheral pulses strong  ABDOMEN: soft, nontender, no hepatosplenomegaly, no masses and bowel sounds normal  MS: no gross musculoskeletal defects noted, no edema  PSYCH: mentation appears normal, affect normal/bright    Diagnostic Test Results:  Results for orders placed or performed in visit on 04/16/18 (from the past 24 hour(s))   Beta HCG Qual, Urine - [facility] and [facility] ([id number])   Result Value Ref Range    Beta HCG Qual IFA Urine Negative NEG^Negative          ASSESSMENT/PLAN:     1. Encounter for surveillance of injectable contraceptive  Restart depo  - Beta HCG Qual, Urine - [facility] ([id number])  - medroxyPROGESTERone (DEPO-PROVERA) 150 MG/ML injection; Inject 1 mL (150 mg) into the muscle every 3 months  Dispense: 3 mL; Refill: 3    FUTURE APPOINTMENTS:       - Make appointment with nurse for depo in  12 - 15 weeks    Lisa Carney MD  [facility]    "

## 2018-04-16 NOTE — NURSING NOTE
BP: 113/71  141 lbs 12.8 oz    LAST PAP/EXAM:   Lab Results   Component Value Date    PAP NIL 04/24/2017     URINE HCG:negative    The following medication was given: Janessa Souza MA      MEDICATION: Depo Provera 150mg  ROUTE: IM  SITE: LUQ - Gluteus  : Dattch  LOT #: [id number]  EXP:4/2020  NEXT INJECTION DUE: 7/2/18 - 7/16/18   NDC:[address]-6  Provider: Dr. Stan Ramos

## 2018-04-22 ENCOUNTER — MYC MEDICAL ADVICE (OUTPATIENT)
Dept: FAMILY MEDICINE | Facility: CLINIC | Age: 33
End: 2018-04-22

## 2018-04-23 NOTE — TELEPHONE ENCOUNTER
E-visit instructions given to Cruz to further discuss stomach issues. This was not discussed at her last office visit.    Gallo Barnett RN, BSN

## 2018-04-30 ENCOUNTER — MYC MEDICAL ADVICE (OUTPATIENT)
Dept: FAMILY MEDICINE | Facility: CLINIC | Age: 33
End: 2018-04-30

## 2018-04-30 NOTE — TELEPHONE ENCOUNTER
Patient doesn't want to do an evisit  She would like [facility] opinion on whether an office visit  Is needed for this issue or not    Thank you

## 2018-06-05 ENCOUNTER — APPOINTMENT (OUTPATIENT)
Dept: OPTOMETRY | Facility: CLINIC | Age: 33
End: 2018-06-05
Payer: COMMERCIAL

## 2018-06-05 ENCOUNTER — OFFICE VISIT (OUTPATIENT)
Dept: OPTOMETRY | Facility: CLINIC | Age: 33
End: 2018-06-05
Payer: COMMERCIAL

## 2018-06-05 DIAGNOSIS — H52.03 HYPERMETROPIA OF BOTH EYES: Primary | ICD-10-CM

## 2018-06-05 PROCEDURE — 92014 COMPRE OPH EXAM EST PT 1/>: CPT | Performed by: OPTOMETRIST

## 2018-06-05 PROCEDURE — 92341 FIT SPECTACLES BIFOCAL: CPT | Performed by: OPTOMETRIST

## 2018-06-05 PROCEDURE — 92015 DETERMINE REFRACTIVE STATE: CPT | Performed by: OPTOMETRIST

## 2018-06-05 ASSESSMENT — CONF VISUAL FIELD
OD_NORMAL: 1
OS_NORMAL: 1

## 2018-06-05 ASSESSMENT — REFRACTION
OD_SPHERE: +2.50
OS_SPHERE: +2.75

## 2018-06-05 ASSESSMENT — SLIT LAMP EXAM - LIDS
COMMENTS: NORMAL
COMMENTS: NORMAL

## 2018-06-05 ASSESSMENT — VISUAL ACUITY
OS_SC: 20/20
OD_CC: 20/20
OD_CC: 20/20
CORRECTION_TYPE: GLASSES
OD_SC: 20/20
METHOD: SNELLEN - LINEAR
OS_CC: 20/20
OS_CC: 20/20

## 2018-06-05 ASSESSMENT — TONOMETRY
IOP_METHOD: TONOPEN
OS_IOP_MMHG: 18
OD_IOP_MMHG: 17

## 2018-06-05 ASSESSMENT — REFRACTION_WEARINGRX
OS_SPHERE: +1.25
OD_SPHERE: +1.25
SPECS_TYPE: SVL

## 2018-06-05 ASSESSMENT — CUP TO DISC RATIO
OD_RATIO: 0.35
OS_RATIO: 0.35

## 2018-06-05 ASSESSMENT — REFRACTION_MANIFEST
OS_ADD: +0.75
OS_CYLINDER: SPHERE
OD_SPHERE: +1.25
OD_CYLINDER: SPHERE
OS_SPHERE: +1.50
OD_ADD: +0.75

## 2018-06-05 ASSESSMENT — EXTERNAL EXAM - LEFT EYE: OS_EXAM: NORMAL

## 2018-06-05 ASSESSMENT — EXTERNAL EXAM - RIGHT EYE: OD_EXAM: NORMAL

## 2018-06-05 NOTE — PATIENT INSTRUCTIONS
Recommend new glasses.  Give the glasses 1-2 weeks to adjust to the new prescription.  You may get headaches or eyestrain as your eyes get used to the new prescription.  Sometimes the symptoms get worse before it gets better.  If any problems after 1-2 weeks schedule an appointment for a recheck.      Return in 1 year for a complete eye exam or sooner if needed.    Jarret Matson, JACE      The affects of the dilating drops last for 4- 6 hours.  You will be more sensitive to light and vision will be blurry up close.  Mydriatic sunglasses were given if needed.      Optometry Providers       Clinic Locations                                 Telephone Number   Dr. Eva Santos and [place]   [place] [phone]     [facility] Hours:                [facility] Hours:       [facility] Hours:   [address]   [address] [facility]     [address] [facility]  [place] [address]   [place] [address]    [place] [address]  Phone: [phone]                    Phone: [phone]     Phone: [phone]                      Monday 8:00-7:00                          Monday 8:00-7:00                          Monday 8:00-7:00              Tuesday 8:00-6:00                          Tuesday 8:00-7:00                          Tuesday 8:00-7:00              Wednesday 8:00-6:00                  Wednesday 8:00-7:00                   Wednesday 8:00-7:00      Thursday 8:00-6:00                        Thursday 8:00-7:00                         Thursday 8:00-7:00            Friday 8:00-5:00                              Friday 8:00-5:00                              Friday 8:00-5:00    [facility] Hours:   [address] [facility] [place] [id number] [phone]    Monday 8:00-7:00  Tuesday 8:00-7:00  Wednesday 8:00-7:00  Thursday 8:00-7:00  Friday 8:00-5:00  Please log on to [email or web address] to  order your contact lenses.  The link is found on the [facility] page.  As always, Thank you for trusting us with your health care needs!

## 2018-06-05 NOTE — PROGRESS NOTES
Chief Complaint   Patient presents with     COMPREHENSIVE EYE EXAM      Accompanied by cousin  Last Eye Exam: 5-  Dilated Previously: Yes    What are you currently using to see?  glasses       Distance Vision Acuity: Satisfied with vision    Near Vision Acuity: Not satisfied     Eye Comfort: headache from straining eyes- maybe needs to go to bifocal  Do you use eye drops? : No  Occupation or Hobbies: -[employer] out of home    Trini Browne Optometric Assistant, A.B.O.C.          Medical, surgical and family histories reviewed and updated 6/5/2018.       OBJECTIVE: See Ophthalmology exam    ASSESSMENT:    ICD-10-CM    1. 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  Give the glasses 1-2 weeks to adjust to the new prescription.  You may get headaches or eyestrain as your eyes get used to the new prescription.  Sometimes the symptoms get worse before it gets better.  If any problems after 1-2 weeks schedule an appointment for a recheck.      Return in 1 year for a complete eye exam or sooner if needed.    Jarret Matson, OD

## 2018-06-05 NOTE — MR AVS SNAPSHOT
After Visit Summary   6/5/2018    Cruz Obregon    MRN: [id number]           Patient Information     Date Of Birth          1985        Visit Information        Provider Department      6/5/2018 10:40 AM Jarret Matson OD [facility]        Today's Diagnoses     Hypermetropia of both eyes    -  1      Care Instructions    Recommend new glasses.  Give the glasses 1-2 weeks to adjust to the new prescription.  You may get headaches or eyestrain as your eyes get used to the new prescription.  Sometimes the symptoms get worse before it gets better.  If any problems after 1-2 weeks schedule an appointment for a recheck.      Return in 1 year for a complete eye exam or sooner if needed.    Jarret Matson OD      The affects of the dilating drops last for 4- 6 hours.  You will be more sensitive to light and vision will be blurry up close.  Mydriatic sunglasses were given if needed.      Optometry Providers       Clinic Locations                                 Telephone Number   Dr. Eva Obregon [facility] and [place]   [place] [phone]     [facility] Hours:                [facility] Hours:       [facility] Hours:   [address]   [address] [address]      [address] [place] [address]   [place] [address]    [place] [address]  Phone: [phone]                    Phone: [phone]     Phone: [phone]                      Monday 8:00-7:00                          Monday 8:00-7:00                          Monday 8:00-7:00              Tuesday 8:00-6:00                          Tuesday 8:00-7:00                          Tuesday 8:00-7:00              Wednesday 8:00-6:00                  Wednesday 8:00-7:00                   Wednesday 8:00-7:00      Thursday 8:00-6:00                        Thursday 8:00-7:00                          Thursday 8:00-7:00            Friday 8:00-5:00                              Friday 8:00-5:00                              Friday 8:00-5:00    [facility] Hours:   [address] [place] [address]  [phone]    Monday 8:00-7:00  Tuesday 8:00-7:00  Wednesday 8:00-7:00  Thursday 8:00-7:00  Friday 8:00-5:00  Please log on to [employer] to order your contact lenses.  The link is found on the [facility] Care [facility] Vision Services page.  As always, Thank you for trusting us with your health care needs!            Follow-ups after your visit        Follow-up notes from your care team     Return in about 1 year (around 6/5/2019) for Annual Visit.      Your next 10 appointments already scheduled     Jul 03, 2018 12:00 PM CDT   Nurse Only with [facility]   [facility]    [address] [place] [address] [phone]              Who to contact     If you have questions or need follow up information about today's clinic visit or your schedule please contact [facility] directly at [phone].  Normal or non-critical lab and imaging results will be communicated to you by [employer], letter or phone within 4 business days after the clinic has received the results. If you do not hear from us within 7 days, please contact the clinic through [employer] or phone. If you have a critical or abnormal lab result, we will notify you by phone as soon as possible.  Submit refill requests through [employer] or call your pharmacy and they will forward the refill request to us. Please allow 3 business days for your refill to be completed.          Additional Information About Your Visit        [employer] Information     [employer] gives you secure access to your electronic health record. If you see a primary care provider, you can also send messages to your care team and make appointments. If you have questions, please call your primary care  clinic.  If you do not have a primary care provider, please call [phone] and they will assist you.        Care EveryWhere ID     This is your Care EveryWhere ID. This could be used by other organizations to access your [facility] medical records  [id number]         Blood Pressure from Last 3 Encounters:   04/16/18 113/71   12/18/17 115/76   07/12/17 114/68    Weight from Last 3 Encounters:   04/16/18 64.3 kg (141 lb 12.8 oz)   12/18/17 54.4 kg (120 lb)   07/12/17 53.5 kg (117 lb 14.4 oz)              We Performed the Following     EYE EXAM (SIMPLE-NONBILLABLE)     REFRACTION        Primary Care Provider Office Phone # Fax #    Lisa Fannatalie Carney [phone] [address]  [facility] [address]        Equal Access to Services     SAMEERA KELLER : Hadii aad ku hadasho Soomaali, waaxda luqadaha, qaybta kaalmada adeegyada, waxay idiin hayaan chet spring . So [facility] [phone].    ATENCIÓN: Si habla español, tiene a herring disposición servicios gratuitos de asistencia lingüística. Llame al [phone].    We comply with applicable federal civil rights laws and [place] laws. We do not discriminate on the basis of race, color, national origin, age, disability, sex, sexual orientation, or gender identity.            Thank you!     Thank you for choosing [facility]  for your care. Our goal is always to provide you with excellent care. Hearing back from our patients is one way we can continue to improve our services. Please take a few minutes to complete the written survey that you may receive in the mail after your visit with us. Thank you!             Your Updated Medication List - Protect others around you: Learn how to safely use, store and throw away your medicines at www.disposemymeds.org.          This list is accurate as of 6/5/18  2:39 PM.  Always use your most recent med list.                   Brand Name Dispense Instructions for use  Diagnosis    medroxyPROGESTERone 150 MG/ML injection    DEPO-PROVERA    3 mL    Inject 1 mL (150 mg) into the muscle every 3 months    Encounter for surveillance of injectable contraceptive       metroNIDAZOLE 0.75 % vaginal gel    METROGEL    70 g    Place 1 applicator (5 g) vaginally At Bedtime    Vaginal odor

## 2018-06-05 NOTE — LETTER
6/5/2018         RE: Crzu Obregon  [address]  [facility] [address]        Dear Colleague,    Thank you for referring your patient, Cruz Obregon, to [facility]. Please see a copy of my visit note below.    Chief Complaint   Patient presents with     COMPREHENSIVE EYE EXAM      Accompanied by cousin  Last Eye Exam: 5-  Dilated Previously: Yes    What are you currently using to see?  glasses       Distance Vision Acuity: Satisfied with vision    Near Vision Acuity: Not satisfied     Eye Comfort: headache from straining eyes- maybe needs to go to bifocal  Do you use eye drops? : No  Occupation or Hobbies: -[employer] out of home    Trini Browne Optometric Assistant, A.B.O.C.          Medical, surgical and family histories reviewed and updated 6/5/2018.       OBJECTIVE: See Ophthalmology exam    ASSESSMENT:    ICD-10-CM    1. 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  Give the glasses 1-2 weeks to adjust to the new prescription.  You may get headaches or eyestrain as your eyes get used to the new prescription.  Sometimes the symptoms get worse before it gets better.  If any problems after 1-2 weeks schedule an appointment for a recheck.      Return in 1 year for a complete eye exam or sooner if needed.    Jarret Matson OD           Again, thank you for allowing me to participate in the care of your patient.        Sincerely,        Jarret Matson, OD

## 2018-07-03 ENCOUNTER — ALLIED HEALTH/NURSE VISIT (OUTPATIENT)
Dept: NURSING | Facility: CLINIC | Age: 33
End: 2018-07-03
Payer: COMMERCIAL

## 2018-07-03 VITALS — SYSTOLIC BLOOD PRESSURE: 132 MMHG | DIASTOLIC BLOOD PRESSURE: 65 MMHG | BODY MASS INDEX: 21.38 KG/M2 | WEIGHT: 134.6 LBS

## 2018-07-03 DIAGNOSIS — Z30.42 ENCOUNTER FOR DEPO-PROVERA CONTRACEPTION: Primary | ICD-10-CM

## 2018-07-03 PROCEDURE — 96372 THER/PROPH/DIAG INJ SC/IM: CPT

## 2018-07-03 PROCEDURE — 99207 ZZC NO CHARGE NURSE ONLY: CPT

## 2018-07-03 NOTE — PROGRESS NOTES
Follow Up Injection    Patient returning during stated date range given at previous visit: Yes      If here at the correct interval:   BP Readings from Last 1 Encounters:   07/03/18 132/65     Wt Readings from Last 1 Encounters:   07/03/18 134 lb 9.6 oz (61.1 kg)       Last Pap/exam date:   Lab Results   Component Value Date    PAP NIL 04/24/2017       Side effects or problems with last injection?  No.  Date range is given to patient for next dose: 09/18-10/1    See Medication Note for administration information    Staff Sig: Viviane Pepper

## 2018-07-03 NOTE — MR AVS SNAPSHOT
After Visit Summary   7/3/2018    Cruz Obregon    MRN: [id number]           Patient Information     Date Of Birth          1985        Visit Information        Provider Department      7/3/2018 12:00 PM [facility]        Today's Diagnoses     Encounter for Depo-Provera contraception    -  1       Follow-ups after your visit        Your next 10 appointments already scheduled     Sep 18, 2018 12:00 PM CDT   Nurse Only with [facility] AN[facility]LL[facility]   [facility] ([facility])    [address] [facility] [id number] [phone]              Who to contact     If you have questions or need follow up information about today's clinic visit or your schedule please contact [facility] directly at [phone].  Normal or non-critical lab and imaging results will be communicated to you by MyChart, letter or phone within 4 business days after the clinic has received the results. If you do not hear from us within 7 days, please contact the clinic through [employer] or phone. If you have a critical or abnormal lab result, we will notify you by phone as soon as possible.  Submit refill requests through [employer] or call your pharmacy and they will forward the refill request to us. Please allow 3 business days for your refill to be completed.          Additional Information About Your Visit        MyChart Information     [employer] gives you secure access to your electronic health record. If you see a primary care provider, you can also send messages to your care team and make appointments. If you have questions, please call your primary care clinic.  If you do not have a primary care provider, please call [phone] and they will assist you.        Care EveryWhere ID     This is your Care EveryWhere ID. This could be used by other organizations to access your [facility] medical records  [id number]        Your Vitals  Were     BMI (Body Mass Index)                   21.38 kg/m2            Blood Pressure from Last 3 Encounters:   07/03/18 132/65   04/16/18 113/71   12/18/17 115/76    Weight from Last 3 Encounters:   07/03/18 134 lb 9.6 oz (61.1 kg)   04/16/18 141 lb 12.8 oz (64.3 kg)   12/18/17 120 lb (54.4 kg)              We Performed the Following     C Medroxyprogesterone inj/1mg (J-Code)     THER/PROPH/DIAG INJ, SC/IM        Primary Care Provider Office Phone # Fax #    Lisa Chaudhari Sharon Carney [phone]       [address]  [facility] [address]        Equal Access to Services     CALEB KELLER : Hadii zhang ku hadasho Soomaali, waaxda luqadaha, qaybta kaalmada adeegyada, waxay edin haybelinda spring . So [facility] [phone].    ATENCIÓN: Si habla español, tiene a herring disposición servicios gratuitos de asistencia lingüística. Llame al [phone].    We comply with applicable federal civil rights laws and [place] laws. We do not discriminate on the basis of race, color, national origin, age, disability, sex, sexual orientation, or gender identity.            Thank you!     Thank you for choosing [facility]  for your care. Our goal is always to provide you with excellent care. Hearing back from our patients is one way we can continue to improve our services. Please take a few minutes to complete the written survey that you may receive in the mail after your visit with us. Thank you!             Your Updated Medication List - Protect others around you: Learn how to safely use, store and throw away your medicines at www.disposemymeds.org.          This list is accurate as of 7/3/18 12:24 PM.  Always use your most recent med list.                   Brand Name Dispense Instructions for use Diagnosis    medroxyPROGESTERone 150 MG/ML injection    DEPO-PROVERA    3 mL    Inject 1 mL (150 mg) into the muscle every 3 months    Encounter for surveillance of injectable  contraceptive       metroNIDAZOLE 0.75 % vaginal gel    METROGEL    70 g    Place 1 applicator (5 g) vaginally At Bedtime    Vaginal odor

## 2018-07-03 NOTE — LETTER
July 3, 2018      Dear Cruz    Depo Provera (MedroxyProgresterone) Reminder for:    Cruz Obregon was given the Depo-Provera (MedroxyProgesterone) contraception injection on: 07/03/18             Next injection is due:  9/18/18 - 10/2/18   Primary Provider:  [facility]        Sincerely     Dr. Weller/Viviane AGUAYO

## 2018-07-10 ENCOUNTER — TELEPHONE (OUTPATIENT)
Dept: FAMILY MEDICINE | Facility: CLINIC | Age: 33
End: 2018-07-10

## 2018-07-10 ENCOUNTER — OFFICE VISIT (OUTPATIENT)
Dept: URGENT CARE | Facility: URGENT CARE | Age: 33
End: 2018-07-10
Payer: COMMERCIAL

## 2018-07-10 VITALS
OXYGEN SATURATION: 98 % | RESPIRATION RATE: 18 BRPM | DIASTOLIC BLOOD PRESSURE: 76 MMHG | SYSTOLIC BLOOD PRESSURE: 120 MMHG | TEMPERATURE: 99 F | WEIGHT: 139 LBS | BODY MASS INDEX: 22.08 KG/M2 | HEART RATE: 71 BPM

## 2018-07-10 DIAGNOSIS — T78.40XA ALLERGIC REACTION, INITIAL ENCOUNTER: Primary | ICD-10-CM

## 2018-07-10 PROCEDURE — 99213 OFFICE O/P EST LOW 20 MIN: CPT | Performed by: NURSE PRACTITIONER

## 2018-07-10 RX ORDER — CETIRIZINE HYDROCHLORIDE 10 MG/1
10 TABLET ORAL EVERY EVENING
Qty: 14 TABLET | Refills: 0 | Status: SHIPPED | OUTPATIENT
Start: 2018-07-10 | End: 2018-07-24

## 2018-07-10 RX ORDER — FAMOTIDINE 20 MG/1
20 TABLET, FILM COATED ORAL 2 TIMES DAILY
Qty: 14 TABLET | Refills: 0 | Status: SHIPPED | OUTPATIENT
Start: 2018-07-10 | End: 2018-07-17

## 2018-07-10 ASSESSMENT — ENCOUNTER SYMPTOMS
HEADACHES: 0
SORE THROAT: 0
SHORTNESS OF BREATH: 0
NAUSEA: 0
COUGH: 0
DIARRHEA: 0
CHILLS: 0
RHINORRHEA: 0
FEVER: 0
VOMITING: 0

## 2018-07-10 NOTE — MR AVS SNAPSHOT
After Visit Summary   7/10/2018    Cruz Obregon    MRN: [id number]           Patient Information     Date Of Birth          1985        Visit Information        Provider Department      7/10/2018 11:25 AM Kylee Nuñez NP [facility]        Today's Diagnoses     Allergic reaction, initial encounter    -  1      Care Instructions      General Allergic Reactions  An allergic reaction is a set of symptoms caused by an allergen. An allergen is something that causes a person s immune system to react. When a person comes in contact with an allergen, it causes the body to release chemicals. These include the chemical histamine. Histamine causes swelling and itching. It may affect the entire body. This is called a general allergic reaction. Often symptoms affect only 1 part of the body. This is called a local allergic reaction.  You are having an allergic reaction. Almost anything can cause one. Different people are allergic to different things. It is usually something that you ate or swallowed, came into contact with by getting or putting it on your skin or clothes, or something you breathed in the air. This can be very annoying and sometimes scary.  Most of us think of allergic reactions when we have a rash or itchy skin. Symptoms can include:    Itching of the eyes, nose, and roof of the mouth    Runny or stuffy nose    Watery eyes     Sneezing or coughing     A blocked feeling in the ear    Red, itchy rash called hives    Red and purple spots    Rash, redness, welts, blisters    Itching, burning, stinging, pain    Dry, flaky, cracking, scaly skin  Severe symptoms include:    Swelling of the face, lips, or other parts of the body    Hoarse voice    Trouble swallowing, feeling like your throat is closing    Trouble breathing, wheezing    Nausea, vomiting, diarrhea, stomach cramps    Feeling faint or lightheaded, rapid heart rate  Sometimes the cause may be obvious. But there are so  many things that can cause a reaction that you may not be able to figure out. The most important things to help find your allergen are:    Remembering when it started    What you were doing at the time or just before that    Any activities you were involved in    Any new products or contacts  Below are some common causes. But remember that almost anything can cause a reaction. You may not even be aware that you came into contact with one of these things:    Dust, mold, pollen    Plants (common ones are poison ivy and poison oak, but there are many others)     Animals    Foods such as shrimp, shellfish, peanuts, milk products, gluten, and eggs. Also food colorings, flavorings, and additives.    Insect bites or stings such as bees, mosquitos, fleas, ticks    Medicines such as penicillin, sulfa medicines, amoxicillin, aspirin, and ibuprofen. But any medicine can cause a reaction.    Jewelry such as nickel or gold. This can be new, or something you ve worn for a while, including zippers and buttons.    Latex such as in gloves, clothes, toys, balloons, or some tapes. Some people allergic to latex may also have problems with foods like bananas, avocados, kiwi, papaya, or chestnuts.    Lotions, perfumes, cosmetics, soaps, shampoos, skincare products, nail products    Chemicals or dyes in clothing, linen, , hair dyes, soaps, iodine  Many viruses and common colds can cause a rash that is not an allergic reaction. Sometimes it is hard to tell the difference between allergies, sensitivity, or an intolerance to something. This is especially true with food. Many things can cause diarrhea, vomiting, stomach cramps, and skin irritation.  Home care    The goal of treatment is to help relieve the symptoms and get you feeling better. The rash will usually fade over several days. But it can sometimes last a couple of weeks. Over the next couple of days, there may be times when it is gets a little worse, and then better again.  Here are some things to do:    If you know what you are allergic to, stay away from it. Future reactions could be worse than this one.    Avoid tight clothing and anything that heats up your skin (hot showers or baths, direct sunlight). Heat will make itching worse.    An ice pack will relieve local areas of intense itching and redness. To make an ice pack, put ice cubes in a plastic bag that seals at the top. Wrap it in a thin, clean towel. Don t put the ice directly on the skin because it can damage the skin.    Oral diphenhydramine is an over-the-counter antihistamine sold at pharmacy and grocery stores. Unless a prescription antihistamine was given, diphenhydramine may be used to reduce itching if large areas of the skin are involved. It may make you sleepy. So be careful using it in the daytime or when going to school, working, or driving. Note: Don t use diphenhydramine if you have glaucoma or if you are a man with trouble urinating due to an enlarged prostate. There are other antihistamines that won t make you so sleepy. These are good choices for daytime use. Ask your pharmacist for suggestions.    Don t use diphenhydramine cream on your skin. It can cause a further reaction in some people.    To help prevent an infection, don't scratch the affected area. Scratching may worsen the reaction and damage your skin. It can also lead to an infection. Always check the affected for signs of an infection.    Call your healthcare provider and ask what you can use to help decrease the itching.    To decrease allergic reactions, try the following:      Use heat-steam to clean your home    Use high-efficiency particulate (HEPA) vacuums and filters    Stay away from food and pet triggers    Kill any cockroaches    Clean your house often  Follow-up care  Follow up with your healthcare provider, or as advised. If you had a severe reaction today, or if you have had several mild to medium allergic reactions in the past, ask  your provider about allergy testing. This can help you find out what you are allergic to. If your reaction included dizziness, fainting, or trouble breathing or swallowing, ask your provider about carrying auto-injectable epinephrine.  Call 911  Call 911 if any of these occur:    Trouble breathing or swallowing, wheezing    Cool, moist, pale skin    Shortness of breath    Hoarse voice or trouble speaking    Confused     Very drowsy or trouble awakening    Fainting or loss of consciousness    Rapid heart rate    Feeling of dizziness or weakness or a sudden drop in blood pressure    Feeling of doom    Feeling lightheaded    Severe nausea or vomiting, or diarrhea    Seizure    Swelling in the face, eyelids, lips, mouth, throat or tongue    Drooling  When to seek medical advice  Call your healthcare provider right away if any of these occur:    Spreading areas of itching, redness or swelling    Nausea or stomach cramps or abdominal pain    Continuing or recurring symptoms    Spreading areas of redness, swelling, or itching    Signs of infection at the affected site:  ? Spreading redness  ? Increased pain or swelling  ? Fluid or colored drainage from the site  ? Fever of 100.4 F (38 C) or above lasting for 24 to 48 hours, or as directed by your provider  Date Last Reviewed: 3/1/2017    1773-0252 [employer]. [address] [place] [address]. All rights reserved. This information is not intended as a substitute for professional medical care. Always follow your healthcare professional's instructions.                Follow-ups after your visit        Your next 10 appointments already scheduled     Sep 18, 2018 12:00 PM CDT   Nurse Only with [facility]   [facility] ([facility])    [address] [place] [id number] [phone]              Who to contact     If you have questions or need follow up information about today's clinic visit or  your schedule please contact [facility] directly at [phone].  Normal or non-critical lab and imaging results will be communicated to you by MyChart, letter or phone within 4 business days after the clinic has received the results. If you do not hear from us within 7 days, please contact the clinic through [facility] or phone. If you have a critical or abnormal lab result, we will notify you by phone as soon as possible.  Submit refill requests through [employer] or call your pharmacy and they will forward the refill request to us. Please allow 3 business days for your refill to be completed.          Additional Information About Your Visit        [facility] Information     [employer] gives you secure access to your electronic health record. If you see a primary care provider, you can also send messages to your care team and make appointments. If you have questions, please call your primary care clinic.  If you do not have a primary care provider, please call [phone] and they will assist you.        Care EveryWhere ID     This is your Care EveryWhere ID. This could be used by other organizations to access your [place] medical records  [id number]        Your Vitals Were     Pulse Temperature Respirations Pulse Oximetry BMI (Body Mass Index)       71 99  F (37.2  C) (Oral) 18 98% 22.08 kg/m2        Blood Pressure from Last 3 Encounters:   07/10/18 120/76   07/03/18 132/65   04/16/18 113/71    Weight from Last 3 Encounters:   07/10/18 139 lb (63 kg)   07/03/18 134 lb 9.6 oz (61.1 kg)   04/16/18 141 lb 12.8 oz (64.3 kg)              Today, you had the following     No orders found for display         Today's Medication Changes          These changes are accurate as of 7/10/18 11:44 AM.  If you have any questions, ask your nurse or doctor.               Start taking these medicines.        Dose/Directions    cetirizine 10 MG tablet   Commonly known as:  zyrTEC   Used for:  Allergic reaction, initial  encounter   Started by:  Kylee Nuñez NP        Dose:  10 mg   Take 1 tablet (10 mg) by mouth every evening for 14 days   Quantity:  14 tablet   Refills:  0       famotidine 20 MG tablet   Commonly known as:  PEPCID   Used for:  Allergic reaction, initial encounter   Started by:  Kylee Nuñez NP        Dose:  20 mg   Take 1 tablet (20 mg) by mouth 2 times daily for 7 days   Quantity:  14 tablet   Refills:  0            Where to get your medicines      These medications were sent to [facility] [address] [address]  [address] [place] [id number]     Phone:  [phone]     cetirizine 10 MG tablet    famotidine 20 MG tablet                Primary Care Provider Office Phone # Fax #    Lisa Fanleonor Carney [phone] [address] [place]  [facility] [address]        Equal Access to Services     [place] [facility]: Hadii aad ku hadasho Soomaali, waaxda luqadaha, qaybta kaalmada adeegyada, waxay idiin hayaan xiaoeg neishaaranancy spring . So [facility] [phone].    ATENCIÓN: Si habla español, tiene a herring disposición servicios gratuitos de asistencia lingüística. Llame al [phone].    We comply with applicable federal civil rights laws and [place] laws. We do not discriminate on the basis of race, color, national origin, age, disability, sex, sexual orientation, or gender identity.            Thank you!     Thank you for choosing [facility]  for your care. Our goal is always to provide you with excellent care. Hearing back from our patients is one way we can continue to improve our services. Please take a few minutes to complete the written survey that you may receive in the mail after your visit with us. Thank you!             Your Updated Medication List - Protect others around you: Learn how to safely use, store and throw away your medicines at www.disposemymeds.org.          This list is accurate as of  7/10/18 11:44 AM.  Always use your most recent med list.                   Brand Name Dispense Instructions for use Diagnosis    cetirizine 10 MG tablet    zyrTEC    14 tablet    Take 1 tablet (10 mg) by mouth every evening for 14 days    Allergic reaction, initial encounter       famotidine 20 MG tablet    PEPCID    14 tablet    Take 1 tablet (20 mg) by mouth 2 times daily for 7 days    Allergic reaction, initial encounter       medroxyPROGESTERone 150 MG/ML injection    DEPO-PROVERA    3 mL    Inject 1 mL (150 mg) into the muscle every 3 months    Encounter for surveillance of injectable contraceptive       metroNIDAZOLE 0.75 % vaginal gel    METROGEL    70 g    Place 1 applicator (5 g) vaginally At Bedtime    Vaginal odor

## 2018-07-10 NOTE — PROGRESS NOTES
SUBJECTIVE:   Cruz Obregon is a 32 year old female presenting with a chief complaint of   Chief Complaint   Patient presents with     Derm Problem     Rash on arms and abdomen since last night,.       She is an established patient of [facility].    Rash    Onset of rash was 2 hour(s) ago.   Course of illness is sudden onset and worsening.  Severity moderate  Current and Associated symptoms: mild itching and redness  Location of the rash: left antecubital area and abdomen.  Previous history of a similar rash? No  Recent exposure history: none known  Denies exposure to: none known  Associated symptoms include: nothing.  Treatment measures tried include: none      Review of Systems   Constitutional: Negative for chills and fever.   HENT: Negative for congestion, ear pain, rhinorrhea and sore throat.    Respiratory: Negative for cough and shortness of breath.    Gastrointestinal: Negative for diarrhea, nausea and vomiting.   Skin: Positive for rash.   Neurological: Negative for headaches.   All other systems reviewed and are negative.      Past Medical History:   Diagnosis Date     ASCUS on Pap smear 11/17/10    + LR HPV     ASCUS on Pap smear 9/10/12    negative HPV     ASCUS with positive high risk HPV 1/4/12    + HR HPV and LR HPV     Chronic daily headache 10/27/2011     H/O colposcopy with cervical biopsy 1/30/12    WNL     H/O colposcopy with cervical biopsy 2/26/13    WNL     Heart disease     Heart Murmur     Heart murmur     Mitral regurg     High risk HPV infection 12/12/12    normal pap, + HR HPV (52)     Mild Mitral insufficiency 1/4/2011     Mild Tricuspid insufficiency 1/4/2011     Narcolepsy     Diagnosed by sleep specialist based In [place]     Oligohydramnios - delivered 2009    induced @ 37 weeks     Unspecified hemorrhoids without mention of complication      Family History   Problem Relation Age of Onset     Asthma Mother      Alcohol/Drug Mother      Allergies Mother      Hypertension Father       GASTROINTESTINAL DISEASE Father      Alcohol/Drug Father      Lipids Father      Hearing Loss Father      Neurologic Disorder Father      migraine     Diabetes Maternal Grandmother      Cerebrovascular Disease Maternal Grandmother      Cancer Paternal Grandfather      Asthma Sister      Alzheimer Disease Maternal Grandfather      Cardiovascular Other      Neurologic Disorder Paternal Grandmother      migraine     Thyroid Disease No family hx of      Glaucoma No family hx of      Macular Degeneration No family hx of      Current Outpatient Prescriptions   Medication Sig Dispense Refill     cetirizine (ZYRTEC) 10 MG tablet Take 1 tablet (10 mg) by mouth every evening for 14 days 14 tablet 0     famotidine (PEPCID) 20 MG tablet Take 1 tablet (20 mg) by mouth 2 times daily for 7 days 14 tablet 0     medroxyPROGESTERone (DEPO-PROVERA) 150 MG/ML injection Inject 1 mL (150 mg) into the muscle every 3 months 3 mL 3     metroNIDAZOLE (METROGEL) 0.75 % vaginal gel Place 1 applicator (5 g) vaginally At Bedtime (Patient not taking: Reported on 7/10/2018) 70 g 1     Social History   Substance Use Topics     Smoking status: Never Smoker     Smokeless tobacco: Never Used     Alcohol use No       OBJECTIVE  /76  Pulse 71  Temp 99  F (37.2  C) (Oral)  Resp 18  Wt 63 kg (139 lb)  SpO2 98%  BMI 22.08 kg/m2    Physical Exam   Cardiovascular: S1 normal, S2 normal and normal heart sounds.    Pulmonary/Chest: Effort normal and breath sounds normal.   Neurological: She is alert.   Skin:        Mild itchy erythematous red tiny macula and papular rash on left antecubital and left abdomen   ASSESSMENT:      ICD-10-CM    1. Allergic reaction, initial encounter T78.40XA cetirizine (ZYRTEC) 10 MG tablet     famotidine (PEPCID) 20 MG tablet        Medical Decision Making:    Differential Diagnosis:  Rash: Atopic dermatitis  Flea bites  Hives  Insect bites    Serious Comorbid Conditions:  Adult:  None    PLAN:  Reassurance was given to  the patient  Discussed symptoms due to allergies  Advised to avoid allergens if known  Antihistamine as advised  Side effects of medications discussed  OTC medication discussed  Follow up with PCP if symptoms are persisting in 3 days or sooner if getting worse.   Questions are answered and patient is in agreement with treatment plan.      Followup:      Patient Instructions     General Allergic Reactions  An allergic reaction is a set of symptoms caused by an allergen. An allergen is something that causes a person s immune system to react. When a person comes in contact with an allergen, it causes the body to release chemicals. These include the chemical histamine. Histamine causes swelling and itching. It may affect the entire body. This is called a general allergic reaction. Often symptoms affect only 1 part of the body. This is called a local allergic reaction.  You are having an allergic reaction. Almost anything can cause one. Different people are allergic to different things. It is usually something that you ate or swallowed, came into contact with by getting or putting it on your skin or clothes, or something you breathed in the air. This can be very annoying and sometimes scary.  Most of us think of allergic reactions when we have a rash or itchy skin. Symptoms can include:    Itching of the eyes, nose, and roof of the mouth    Runny or stuffy nose    Watery eyes     Sneezing or coughing     A blocked feeling in the ear    Red, itchy rash called hives    Red and purple spots    Rash, redness, welts, blisters    Itching, burning, stinging, pain    Dry, flaky, cracking, scaly skin  Severe symptoms include:    Swelling of the face, lips, or other parts of the body    Hoarse voice    Trouble swallowing, feeling like your throat is closing    Trouble breathing, wheezing    Nausea, vomiting, diarrhea, stomach cramps    Feeling faint or lightheaded, rapid heart rate  Sometimes the cause may be obvious. But there  are so many things that can cause a reaction that you may not be able to figure out. The most important things to help find your allergen are:    Remembering when it started    What you were doing at the time or just before that    Any activities you were involved in    Any new products or contacts  Below are some common causes. But remember that almost anything can cause a reaction. You may not even be aware that you came into contact with one of these things:    Dust, mold, pollen    Plants (common ones are poison ivy and poison oak, but there are many others)     Animals    Foods such as shrimp, shellfish, peanuts, milk products, gluten, and eggs. Also food colorings, flavorings, and additives.    Insect bites or stings such as bees, mosquitos, fleas, ticks    Medicines such as penicillin, sulfa medicines, amoxicillin, aspirin, and ibuprofen. But any medicine can cause a reaction.    Jewelry such as nickel or gold. This can be new, or something you ve worn for a while, including zippers and buttons.    Latex such as in gloves, clothes, toys, balloons, or some tapes. Some people allergic to latex may also have problems with foods like bananas, avocados, kiwi, papaya, or chestnuts.    Lotions, perfumes, cosmetics, soaps, shampoos, skincare products, nail products    Chemicals or dyes in clothing, linen, , hair dyes, soaps, iodine  Many viruses and common colds can cause a rash that is not an allergic reaction. Sometimes it is hard to tell the difference between allergies, sensitivity, or an intolerance to something. This is especially true with food. Many things can cause diarrhea, vomiting, stomach cramps, and skin irritation.  Home care    The goal of treatment is to help relieve the symptoms and get you feeling better. The rash will usually fade over several days. But it can sometimes last a couple of weeks. Over the next couple of days, there may be times when it is gets a little worse, and then better  again. Here are some things to do:    If you know what you are allergic to, stay away from it. Future reactions could be worse than this one.    Avoid tight clothing and anything that heats up your skin (hot showers or baths, direct sunlight). Heat will make itching worse.    An ice pack will relieve local areas of intense itching and redness. To make an ice pack, put ice cubes in a plastic bag that seals at the top. Wrap it in a thin, clean towel. Don t put the ice directly on the skin because it can damage the skin.    Oral diphenhydramine is an over-the-counter antihistamine sold at pharmacy and grocery stores. Unless a prescription antihistamine was given, diphenhydramine may be used to reduce itching if large areas of the skin are involved. It may make you sleepy. So be careful using it in the daytime or when going to school, working, or driving. Note: Don t use diphenhydramine if you have glaucoma or if you are a man with trouble urinating due to an enlarged prostate. There are other antihistamines that won t make you so sleepy. These are good choices for daytime use. Ask your pharmacist for suggestions.    Don t use diphenhydramine cream on your skin. It can cause a further reaction in some people.    To help prevent an infection, don't scratch the affected area. Scratching may worsen the reaction and damage your skin. It can also lead to an infection. Always check the affected for signs of an infection.    Call your healthcare provider and ask what you can use to help decrease the itching.    To decrease allergic reactions, try the following:      Use heat-steam to clean your home    Use high-efficiency particulate (HEPA) vacuums and filters    Stay away from food and pet triggers    Kill any cockroaches    Clean your house often  Follow-up care  Follow up with your healthcare provider, or as advised. If you had a severe reaction today, or if you have had several mild to medium allergic reactions in the  past, ask your provider about allergy testing. This can help you find out what you are allergic to. If your reaction included dizziness, fainting, or trouble breathing or swallowing, ask your provider about carrying auto-injectable epinephrine.  Call 911  Call 911 if any of these occur:    Trouble breathing or swallowing, wheezing    Cool, moist, pale skin    Shortness of breath    Hoarse voice or trouble speaking    Confused     Very drowsy or trouble awakening    Fainting or loss of consciousness    Rapid heart rate    Feeling of dizziness or weakness or a sudden drop in blood pressure    Feeling of doom    Feeling lightheaded    Severe nausea or vomiting, or diarrhea    Seizure    Swelling in the face, eyelids, lips, mouth, throat or tongue    Drooling  When to seek medical advice  Call your healthcare provider right away if any of these occur:    Spreading areas of itching, redness or swelling    Nausea or stomach cramps or abdominal pain    Continuing or recurring symptoms    Spreading areas of redness, swelling, or itching    Signs of infection at the affected site:  ? Spreading redness  ? Increased pain or swelling  ? Fluid or colored drainage from the site  ? Fever of 100.4 F (38 C) or above lasting for 24 to 48 hours, or as directed by your provider  Date Last Reviewed: 3/1/2017    9101-5135 [employer]. [address] [place] [address], [place] [address]. All rights reserved. This information is not intended as a substitute for professional medical care. Always follow your healthcare professional's instructions.

## 2018-07-10 NOTE — PATIENT INSTRUCTIONS
General Allergic Reactions  An allergic reaction is a set of symptoms caused by an allergen. An allergen is something that causes a person s immune system to react. When a person comes in contact with an allergen, it causes the body to release chemicals. These include the chemical histamine. Histamine causes swelling and itching. It may affect the entire body. This is called a general allergic reaction. Often symptoms affect only 1 part of the body. This is called a local allergic reaction.  You are having an allergic reaction. Almost anything can cause one. Different people are allergic to different things. It is usually something that you ate or swallowed, came into contact with by getting or putting it on your skin or clothes, or something you breathed in the air. This can be very annoying and sometimes scary.  Most of us think of allergic reactions when we have a rash or itchy skin. Symptoms can include:    Itching of the eyes, nose, and roof of the mouth    Runny or stuffy nose    Watery eyes     Sneezing or coughing     A blocked feeling in the ear    Red, itchy rash called hives    Red and purple spots    Rash, redness, welts, blisters    Itching, burning, stinging, pain    Dry, flaky, cracking, scaly skin  Severe symptoms include:    Swelling of the face, lips, or other parts of the body    Hoarse voice    Trouble swallowing, feeling like your throat is closing    Trouble breathing, wheezing    Nausea, vomiting, diarrhea, stomach cramps    Feeling faint or lightheaded, rapid heart rate  Sometimes the cause may be obvious. But there are so many things that can cause a reaction that you may not be able to figure out. The most important things to help find your allergen are:    Remembering when it started    What you were doing at the time or just before that    Any activities you were involved in    Any new products or contacts  Below are some common causes. But remember that almost anything can cause a  reaction. You may not even be aware that you came into contact with one of these things:    Dust, mold, pollen    Plants (common ones are poison ivy and poison oak, but there are many others)     Animals    Foods such as shrimp, shellfish, peanuts, milk products, gluten, and eggs. Also food colorings, flavorings, and additives.    Insect bites or stings such as bees, mosquitos, fleas, ticks    Medicines such as penicillin, sulfa medicines, amoxicillin, aspirin, and ibuprofen. But any medicine can cause a reaction.    Jewelry such as nickel or gold. This can be new, or something you ve worn for a while, including zippers and buttons.    Latex such as in gloves, clothes, toys, balloons, or some tapes. Some people allergic to latex may also have problems with foods like bananas, avocados, kiwi, papaya, or chestnuts.    Lotions, perfumes, cosmetics, soaps, shampoos, skincare products, nail products    Chemicals or dyes in clothing, linen, , hair dyes, soaps, iodine  Many viruses and common colds can cause a rash that is not an allergic reaction. Sometimes it is hard to tell the difference between allergies, sensitivity, or an intolerance to something. This is especially true with food. Many things can cause diarrhea, vomiting, stomach cramps, and skin irritation.  Home care    The goal of treatment is to help relieve the symptoms and get you feeling better. The rash will usually fade over several days. But it can sometimes last a couple of weeks. Over the next couple of days, there may be times when it is gets a little worse, and then better again. Here are some things to do:    If you know what you are allergic to, stay away from it. Future reactions could be worse than this one.    Avoid tight clothing and anything that heats up your skin (hot showers or baths, direct sunlight). Heat will make itching worse.    An ice pack will relieve local areas of intense itching and redness. To make an ice pack, put ice  cubes in a plastic bag that seals at the top. Wrap it in a thin, clean towel. Don t put the ice directly on the skin because it can damage the skin.    Oral diphenhydramine is an over-the-counter antihistamine sold at pharmacy and grocery stores. Unless a prescription antihistamine was given, diphenhydramine may be used to reduce itching if large areas of the skin are involved. It may make you sleepy. So be careful using it in the daytime or when going to school, working, or driving. Note: Don t use diphenhydramine if you have glaucoma or if you are a man with trouble urinating due to an enlarged prostate. There are other antihistamines that won t make you so sleepy. These are good choices for daytime use. Ask your pharmacist for suggestions.    Don t use diphenhydramine cream on your skin. It can cause a further reaction in some people.    To help prevent an infection, don't scratch the affected area. Scratching may worsen the reaction and damage your skin. It can also lead to an infection. Always check the affected for signs of an infection.    Call your healthcare provider and ask what you can use to help decrease the itching.    To decrease allergic reactions, try the following:      Use heat-steam to clean your home    Use high-efficiency particulate (HEPA) vacuums and filters    Stay away from food and pet triggers    Kill any cockroaches    Clean your house often  Follow-up care  Follow up with your healthcare provider, or as advised. If you had a severe reaction today, or if you have had several mild to medium allergic reactions in the past, ask your provider about allergy testing. This can help you find out what you are allergic to. If your reaction included dizziness, fainting, or trouble breathing or swallowing, ask your provider about carrying auto-injectable epinephrine.  Call 911  Call 911 if any of these occur:    Trouble breathing or swallowing, wheezing    Cool, moist, pale skin    Shortness of  breath    Hoarse voice or trouble speaking    Confused     Very drowsy or trouble awakening    Fainting or loss of consciousness    Rapid heart rate    Feeling of dizziness or weakness or a sudden drop in blood pressure    Feeling of doom    Feeling lightheaded    Severe nausea or vomiting, or diarrhea    Seizure    Swelling in the face, eyelids, lips, mouth, throat or tongue    Drooling  When to seek medical advice  Call your healthcare provider right away if any of these occur:    Spreading areas of itching, redness or swelling    Nausea or stomach cramps or abdominal pain    Continuing or recurring symptoms    Spreading areas of redness, swelling, or itching    Signs of infection at the affected site:  ? Spreading redness  ? Increased pain or swelling  ? Fluid or colored drainage from the site  ? Fever of 100.4 F (38 C) or above lasting for 24 to 48 hours, or as directed by your provider  Date Last Reviewed: 3/1/2017    8214-4150 [employer]. [address] [place] [address], [place] [address]. All rights reserved. This information is not intended as a substitute for professional medical care. Always follow your healthcare professional's instructions.

## 2018-07-10 NOTE — TELEPHONE ENCOUNTER
Called patient and she is pulling into the clinic to see about an appointment. Advised that none available and she will need to see Urgent Care. She agrees with the plan.    Justa Rodriguez RN, [facility] Triage

## 2018-07-10 NOTE — TELEPHONE ENCOUNTER
Reason for call:  Patient reporting a symptom    Symptom or request: Pt reporting possible allergic reaction.  Rash on both arms and left side rib cage area. States not bothersome.  Says she has seen this before.  Offered patient an appointment, but she declined.    Duration (how long have symptoms been present): since last night.    Have you been treated for this before? Yes    Additional comments:    Phone Number patient can be reached at:  Cell number on file:    Telephone Information:   Mobile [phone]       Best Time:  Any    Can we leave a detailed message on this number:  YES    Call taken on 7/10/2018 at 10:16 AM by Jose Alfredo Betts

## 2018-09-18 ENCOUNTER — ALLIED HEALTH/NURSE VISIT (OUTPATIENT)
Dept: NURSING | Facility: CLINIC | Age: 33
End: 2018-09-18
Payer: COMMERCIAL

## 2018-09-18 VITALS — DIASTOLIC BLOOD PRESSURE: 76 MMHG | SYSTOLIC BLOOD PRESSURE: 124 MMHG | BODY MASS INDEX: 22.79 KG/M2 | WEIGHT: 143.5 LBS

## 2018-09-18 DIAGNOSIS — Z30.013 ENCOUNTER FOR INITIAL PRESCRIPTION OF INJECTABLE CONTRACEPTIVE: Primary | ICD-10-CM

## 2018-09-18 PROCEDURE — 96372 THER/PROPH/DIAG INJ SC/IM: CPT

## 2018-09-18 PROCEDURE — 99207 ZZC NO CHARGE NURSE ONLY: CPT

## 2018-09-18 NOTE — MR AVS SNAPSHOT
After Visit Summary   9/18/2018    Cruz Obregon    MRN: [id number]           Patient Information     Date Of Birth          1985        Visit Information        Provider Department      9/18/2018 12:00 PM [facility]        Today's Diagnoses     Encounter for initial prescription of injectable contraceptive    -  1       Follow-ups after your visit        Your next 10 appointments already scheduled     Dec 06, 2018 11:40 AM CST   Nurse Only with [facility]   [facility]    [address] [place] [id number] [phone]              Who to contact     If you have questions or need follow up information about today's clinic visit or your schedule please contact [facility] directly at [phone].  Normal or non-critical lab and imaging results will be communicated to you by MyChart, letter or phone within 4 business days after the clinic has received the results. If you do not hear from us within 7 days, please contact the clinic through MyChart or phone. If you have a critical or abnormal lab result, we will notify you by phone as soon as possible.  Submit refill requests through [employer] or call your pharmacy and they will forward the refill request to us. Please allow 3 business days for your refill to be completed.          Additional Information About Your Visit        MyChart Information     [employer] gives you secure access to your electronic health record. If you see a primary care provider, you can also send messages to your care team and make appointments. If you have questions, please call your primary care clinic.  If you do not have a primary care provider, please call [phone] and they will assist you.        Care EveryWhere ID     This is your Care EveryWhere ID. This could be used by other organizations to access your [facility]  records  [id number]        Your Vitals Were     BMI (Body Mass Index)                   22.79 kg/m2            Blood Pressure from Last 3 Encounters:   09/18/18 124/76   07/10/18 120/76   07/03/18 132/65    Weight from Last 3 Encounters:   09/18/18 143 lb 8 oz (65.1 kg)   07/10/18 139 lb (63 kg)   07/03/18 134 lb 9.6 oz (61.1 kg)              We Performed the Following     C Medroxyprogesterone inj/1mg (J-Code)     THER/PROPH/DIAG INJ, SC/IM        Primary Care Provider Office Phone # Fax #    Lisa Chaudhari Sharon Carney [phone]       [address]  [facility] [address]        Equal Access to Services     SAMEERA KELLER : Hadii zhang ku hadasho Soomaali, waaxda luqadaha, qaybta kaalmada adeegyada, laz kaur haybelinda spring . So [facility] [phone].    ATENCIÓN: Si habla español, tiene a herring disposición servicios gratuitos de asistencia lingüística. Cheli al [phone].    We comply with applicable federal civil rights laws and [place] laws. We do not discriminate on the basis of race, color, national origin, age, disability, sex, sexual orientation, or gender identity.            Thank you!     Thank you for choosing [facility]  for your care. Our goal is always to provide you with excellent care. Hearing back from our patients is one way we can continue to improve our services. Please take a few minutes to complete the written survey that you may receive in the mail after your visit with us. Thank you!             Your Updated Medication List - Protect others around you: Learn how to safely use, store and throw away your medicines at www.disposemymeds.org.          This list is accurate as of 9/18/18  5:13 PM.  Always use your most recent med list.                   Brand Name Dispense Instructions for use Diagnosis    medroxyPROGESTERone 150 MG/ML injection    DEPO-PROVERA    3 mL    Inject 1 mL (150 mg) into the muscle every 3 months    Encounter  for surveillance of injectable contraceptive       metroNIDAZOLE 0.75 % vaginal gel    METROGEL    70 g    Place 1 applicator (5 g) vaginally At Bedtime    Vaginal odor

## 2018-09-18 NOTE — PROGRESS NOTES
Follow Up Injection    Patient returning during stated date range given at previous visit: Yes      If here at the correct interval:   BP Readings from Last 1 Encounters:   09/18/18 124/76     Wt Readings from Last 1 Encounters:   09/18/18 143 lb 8 oz (65.1 kg)       Last Pap/exam date:   Lab Results   Component Value Date    PAP NIL 04/24/2017         Side effects or problems with last injection?  No.  Date range is given to patient for next dose: 12/04-12/18    See Medication Note for administration information    Staff Sig: Viviane Pepper

## 2018-09-20 ENCOUNTER — MYC MEDICAL ADVICE (OUTPATIENT)
Dept: FAMILY MEDICINE | Facility: CLINIC | Age: 33
End: 2018-09-20

## 2018-09-21 NOTE — TELEPHONE ENCOUNTER
E-Visit needed dot phrase sent to patient in [facility] message. Closing encounter.     Louisa Vences RN

## 2018-09-30 ENCOUNTER — MYC MEDICAL ADVICE (OUTPATIENT)
Dept: FAMILY MEDICINE | Facility: CLINIC | Age: 33
End: 2018-09-30

## 2018-10-01 NOTE — TELEPHONE ENCOUNTER
Routing to provider to advise. This writer reviewed chart and cannot find patient's blood type listed.     Gallo Barnett RN, BSN

## 2018-10-02 ENCOUNTER — TELEPHONE (OUTPATIENT)
Dept: FAMILY MEDICINE | Facility: CLINIC | Age: 33
End: 2018-10-02

## 2018-10-02 NOTE — TELEPHONE ENCOUNTER
.Reason for Call:  Other     Detailed comments: Patient stated that her blood type is O positive     Phone Number Patient can be reached at: Cell number on file:    Telephone Information:   Mobile [phone]       Best Time: ay    Can we leave a detailed message on this number? YES    Call taken on 10/2/2018 at 12:19 PM by Stacy Parekh

## 2018-10-02 NOTE — TELEPHONE ENCOUNTER
.Reason for Call:  Form, our goal is to have forms completed with 72 hours, however, some forms may require a visit or additional information.    Type of letter, form or note:  Consent forms to release records     Who is the form from?: Patient    Where did the form come from: Patient or family brought in       What clinic location was the form placed at?: [place]    Where the form was placed: 's Box    What number is listed as a contact on the form?: [phone] (H)       Additional comments:     Call taken on 10/2/2018 at 12:21 PM by Stacy Parekh

## 2018-11-27 ENCOUNTER — MYC MEDICAL ADVICE (OUTPATIENT)
Dept: FAMILY MEDICINE | Facility: CLINIC | Age: 33
End: 2018-11-27

## 2018-11-28 NOTE — TELEPHONE ENCOUNTER
E-visit instructions given to Cruz to further discuss sleeping issues.     Gallo Barnett RN, BSN

## 2018-12-17 ENCOUNTER — OFFICE VISIT (OUTPATIENT)
Dept: FAMILY MEDICINE | Facility: CLINIC | Age: 33
End: 2018-12-17
Payer: COMMERCIAL

## 2018-12-17 VITALS
HEART RATE: 69 BPM | BODY MASS INDEX: 22.66 KG/M2 | HEIGHT: 67 IN | OXYGEN SATURATION: 99 % | TEMPERATURE: 98 F | WEIGHT: 144.4 LBS | DIASTOLIC BLOOD PRESSURE: 64 MMHG | SYSTOLIC BLOOD PRESSURE: 105 MMHG

## 2018-12-17 DIAGNOSIS — G47.00 INSOMNIA, UNSPECIFIED TYPE: Primary | ICD-10-CM

## 2018-12-17 PROCEDURE — 99213 OFFICE O/P EST LOW 20 MIN: CPT | Performed by: FAMILY MEDICINE

## 2018-12-17 RX ORDER — ZOLPIDEM TARTRATE 5 MG/1
5 TABLET ORAL
Qty: 30 TABLET | Refills: 0 | Status: SHIPPED | OUTPATIENT
Start: 2018-12-17 | End: 2019-01-14

## 2018-12-17 ASSESSMENT — MIFFLIN-ST. JEOR: SCORE: 1395.23

## 2018-12-17 NOTE — PROGRESS NOTES
SUBJECTIVE:   Cruz Obregon is a 33 year old female who presents to clinic today for the following health issues:  Insomnia      Duration: 3 months    Description  Frequency of insomnia:  nightly  Time to fall asleep: four hours  Middle of night awakening:  nightly  Early morning awakening:  occasionally    Accompanying signs and symptoms:  excessive daytime sleepiness and morning headache    History  Similar episodes in past:  no   Previous evaluation/sleep study:  YES- 2011    Precipitating or alleviating factors:  New stressful situation: no   Caffeine intake after lunchtime: no   OTC decongestants: no   Any new medications: no     Therapies tried and outcome: unisom, zquil, melatonin, sleepytime tea, muscle relaxer with no relief    Wakes about 7 am and work starts about 8:45 until about 1 pm and then may work again until 7 pm and trying to sleep from 11 pm - 1 am.  Now taking sleep medication about 7 pm in the hopes she can sleep about 9:30 pm but that doesn't work well.      Problem list and histories reviewed & adjusted, as indicated.  Additional history: as documented    Patient Active Problem List   Diagnosis     CARDIOVASCULAR SCREENING; LDL GOAL LESS THAN 160     Mild Mitral insufficiency     Mild Tricuspid insufficiency     Bacterial vaginosis     Elevated antinuclear antibody (CAMILO) level     Tension headache     Hypersomnia     Joint pain     Paresthesias, right hand     Chronic daily headache     ASCUS with positive high risk HPV cervical     Cervical high risk HPV (human papillomavirus) test positive - 66     Frequent menstruation     Chest pain     Low back pain     Esophageal reflux (GERD)     Past Surgical History:   Procedure Laterality Date     NO HISTORY OF SURGERY         Social History     Tobacco Use     Smoking status: Never Smoker     Smokeless tobacco: Never Used   Substance Use Topics     Alcohol use: No     Alcohol/week: 0.0 oz     Family History   Problem Relation Age of Onset      "Asthma Mother      Alcohol/Drug Mother      Allergies Mother      Hypertension Father      Gastrointestinal Disease Father      Alcohol/Drug Father      Lipids Father      Hearing Loss Father      Neurologic Disorder Father         migraine     Diabetes Maternal Grandmother      Cerebrovascular Disease Maternal Grandmother      Cancer Paternal Grandfather      Asthma Sister      Alzheimer Disease Maternal Grandfather      Cardiovascular Other      Neurologic Disorder Paternal Grandmother         migraine     Thyroid Disease No family hx of      Glaucoma No family hx of      Macular Degeneration No family hx of            Reviewed and updated as needed this visit by clinical staff  Tobacco  Allergies  Meds  Problems  Med Hx  Surg Hx  Fam Hx  Soc Hx        Reviewed and updated as needed this visit by Provider  Tobacco  Allergies  Meds  Problems  Med Hx  Surg Hx  Fam Hx         ROS:  Constitutional, HEENT, cardiovascular, pulmonary, gi and gu systems are negative, except as otherwise noted.    OBJECTIVE:     /64   Pulse 69   Temp 98  F (36.7  C) (Oral)   Ht 1.69 m (5' 6.54\")   Wt 65.5 kg (144 lb 6.4 oz)   SpO2 99%   BMI 22.93 kg/m    Body mass index is 22.93 kg/m .  GENERAL: healthy, alert and no distress  NECK: no adenopathy, no asymmetry, masses, or scars and thyroid normal to palpation  RESP: lungs clear to auscultation - no rales, rhonchi or wheezes  CV: regular rate and rhythm, normal S1 S2, no S3 or S4, no murmur, click or rub, no peripheral edema and peripheral pulses strong  ABDOMEN: soft, nontender, no hepatosplenomegaly, no masses and bowel sounds normal  MS: no gross musculoskeletal defects noted, no edema  NEURO: Normal strength and tone, mentation intact and speech normal  PSYCH: mentation appears normal, affect normal/bright    Diagnostic Test Results:  none     ASSESSMENT/PLAN:     1. Insomnia, unspecified type  Discussed that this may be a paradoxical reaction to her " hypersomnia but trial of ambien for now.  May need to treat with CR or Ritalin if not improved.  - zolpidem (AMBIEN) 5 MG tablet; Take 1 tablet (5 mg) by mouth nightly as needed for sleep  Dispense: 30 tablet; Refill: 0    The uses and side effects, including black box warnings as appropriate, were discussed in detail.  All patient questions were answered.  The patient was instructed to call immediately if any side effects developed.     FUTURE APPOINTMENTS:       - Follow-up visit in 6 months if controlled.    Lisa Carney MD  [facility]

## 2018-12-17 NOTE — PATIENT INSTRUCTIONS
At [facility], we strive to deliver an exceptional experience to you, every time we see you.  If you receive a survey in the mail, please send us back your thoughts. We really do value your feedback.    Your care team:                            Family Medicine Internal Medicine   MD Josh Liu MD Shantel Branch-Fleming, MD Katya Georgiev PA-C Megan Hill, APRN RHIANNON Menard MD Pediatrics   Antonio Alexandre, ZA Spicer, MD Karen Carlin APRN CNP   MD Kim Curiel MD Deborah Mielke, MD Karen Yadav, APRN [facility]      Clinic hours: Monday - Thursday 7 am-7 pm; Fridays 7 am-5 pm.   Urgent care: Monday - Friday 11 am-9 pm; Saturday and Sunday 9 am-5 pm.  Pharmacy : Monday -Thursday 8 am-8 pm; Friday 8 am-6 pm; Saturday and Sunday 9 am-5 pm.     Clinic: [phone]   Pharmacy: [phone]

## 2019-01-14 DIAGNOSIS — G47.00 INSOMNIA, UNSPECIFIED TYPE: ICD-10-CM

## 2019-01-14 RX ORDER — ZOLPIDEM TARTRATE 5 MG/1
TABLET ORAL
Qty: 30 TABLET | Refills: 1 | Status: SHIPPED | OUTPATIENT
Start: 2019-01-14 | End: 2019-03-04

## 2019-01-14 NOTE — TELEPHONE ENCOUNTER
Routing refill request to provider for review/approval because:  Drug not on the [facility], P or [facility] refill protocol or controlled substance  Cayla Martinez RN

## 2019-01-14 NOTE — TELEPHONE ENCOUNTER
Faxed Rx for the Ambien to [facility], [phone], right fax confirmed at 2:34 pm today, 1/14/19.  Shamika Dalton MA/  For [facility] [employer] and [facility]

## 2019-03-04 ENCOUNTER — OFFICE VISIT (OUTPATIENT)
Dept: FAMILY MEDICINE | Facility: CLINIC | Age: 34
End: 2019-03-04
Payer: COMMERCIAL

## 2019-03-04 VITALS
DIASTOLIC BLOOD PRESSURE: 76 MMHG | OXYGEN SATURATION: 100 % | BODY MASS INDEX: 23.67 KG/M2 | HEIGHT: 67 IN | WEIGHT: 150.8 LBS | HEART RATE: 55 BPM | TEMPERATURE: 97.4 F | SYSTOLIC BLOOD PRESSURE: 113 MMHG

## 2019-03-04 DIAGNOSIS — L29.9 CHRONIC PRURITUS: ICD-10-CM

## 2019-03-04 DIAGNOSIS — G47.00 INSOMNIA, UNSPECIFIED TYPE: ICD-10-CM

## 2019-03-04 DIAGNOSIS — Z30.42 ENCOUNTER FOR SURVEILLANCE OF INJECTABLE CONTRACEPTIVE: Primary | ICD-10-CM

## 2019-03-04 DIAGNOSIS — R51.9 CHRONIC DAILY HEADACHE: ICD-10-CM

## 2019-03-04 LAB — HCG UR QL: NEGATIVE

## 2019-03-04 PROCEDURE — 99214 OFFICE O/P EST MOD 30 MIN: CPT | Mod: 25 | Performed by: FAMILY MEDICINE

## 2019-03-04 PROCEDURE — 81025 URINE PREGNANCY TEST: CPT | Performed by: FAMILY MEDICINE

## 2019-03-04 PROCEDURE — 96372 THER/PROPH/DIAG INJ SC/IM: CPT | Performed by: FAMILY MEDICINE

## 2019-03-04 RX ORDER — MEDROXYPROGESTERONE ACETATE 150 MG/ML
150 INJECTION, SUSPENSION INTRAMUSCULAR
Status: ACTIVE | OUTPATIENT
Start: 2019-03-04 | End: 2020-02-27

## 2019-03-04 RX ORDER — ESZOPICLONE 1 MG/1
1-2 TABLET, FILM COATED ORAL AT BEDTIME
Qty: 30 TABLET | Refills: 1 | Status: SHIPPED | OUTPATIENT
Start: 2019-03-04 | End: 2019-03-13 | Stop reason: SINTOL

## 2019-03-04 RX ADMIN — MEDROXYPROGESTERONE ACETATE 150 MG: 150 INJECTION, SUSPENSION INTRAMUSCULAR at 19:01

## 2019-03-04 ASSESSMENT — MIFFLIN-ST. JEOR: SCORE: 1431.65

## 2019-03-04 ASSESSMENT — PAIN SCALES - GENERAL: PAINLEVEL: NO PAIN (0)

## 2019-03-04 NOTE — PROGRESS NOTES
SUBJECTIVE:   Cruz Obregon is a 33 year old female who presents to clinic today for the following health issues:      -Patient is here today for her Depo-Provera injection. Was due in December.  Had menses 2/22/19.    Insomnia - Ambien not helping her fall asleep quicker than 3 hours later and not sleeping longer than 7 hours. Tried 10 mg and same issue.    Daily headaches for last 2 weeks.  Located in frontal areas and involves the eyes and described as heaviness.  1000 mg of tylenol will help but not totally resolve the pain. Nausea started during menses and has continued since then. Going to the gym 3 - 4 times per week for 1 hour.    Generalized itching over the last few days.  Avoiding the things that she is allergic to and not using anything new.  Worse after a shower most of the time.    Problem list and histories reviewed & adjusted, as indicated.  Additional history: as documented    Patient Active Problem List   Diagnosis     CARDIOVASCULAR SCREENING; LDL GOAL LESS THAN 160     Mild Mitral insufficiency     Mild Tricuspid insufficiency     Bacterial vaginosis     Elevated antinuclear antibody (CAMILO) level     Tension headache     Hypersomnia     Joint pain     Paresthesias, right hand     Chronic daily headache     ASCUS with positive high risk HPV cervical     Cervical high risk HPV (human papillomavirus) test positive - 66     Frequent menstruation     Chest pain     Low back pain     Esophageal reflux (GERD)     Past Surgical History:   Procedure Laterality Date     NO HISTORY OF SURGERY         Social History     Tobacco Use     Smoking status: Never Smoker     Smokeless tobacco: Never Used   Substance Use Topics     Alcohol use: No     Alcohol/week: 0.0 oz     Family History   Problem Relation Age of Onset     Asthma Mother      Alcohol/Drug Mother      Allergies Mother      Hypertension Father      Gastrointestinal Disease Father      Alcohol/Drug Father      Lipids Father      Hearing Loss  "Father      Neurologic Disorder Father         migraine     Diabetes Maternal Grandmother      Cerebrovascular Disease Maternal Grandmother      Cancer Paternal Grandfather      Asthma Sister      Alzheimer Disease Maternal Grandfather      Cardiovascular Other      Neurologic Disorder Paternal Grandmother         migraine     Thyroid Disease No family hx of      Glaucoma No family hx of      Macular Degeneration No family hx of            Reviewed and updated as needed this visit by clinical staff  Tobacco  Allergies  Meds  Problems  Med Hx  Surg Hx  Fam Hx  Soc Hx        Reviewed and updated as needed this visit by Provider  Tobacco  Allergies  Meds  Problems  Med Hx  Surg Hx  Fam Hx         ROS:  Constitutional, HEENT, cardiovascular, pulmonary, GI, , musculoskeletal, neuro, skin, endocrine and psych systems are negative, except as otherwise noted.    OBJECTIVE:     /76 (BP Location: Left arm, Patient Position: Chair, Cuff Size: Adult Regular)   Pulse 55   Temp 97.4  F (36.3  C) (Oral)   Ht 1.702 m (5' 7\")   Wt 68.4 kg (150 lb 12.8 oz)   SpO2 100%   Breastfeeding? No   BMI 23.62 kg/m    Body mass index is 23.62 kg/m .  GENERAL: healthy, alert and no distress  EYES: Eyes grossly normal to inspection, PERRL and conjunctivae and sclerae normal  HENT: ear canals and TM's normal, nose and mouth without ulcers or lesions  NECK: no adenopathy, no asymmetry, masses, or scars and thyroid normal to palpation  RESP: lungs clear to auscultation - no rales, rhonchi or wheezes  CV: regular rate and rhythm, normal S1 S2, no S3 or S4, no murmur, click or rub, no peripheral edema and peripheral pulses strong  ABDOMEN: soft, nontender, no hepatosplenomegaly, no masses and bowel sounds normal  MS: no gross musculoskeletal defects noted, no edema  NEURO: Normal strength and tone, mentation intact and speech normal  PSYCH: mentation appears normal, affect normal/bright    Diagnostic Test Results:  none "     ASSESSMENT/PLAN:     1. Encounter for surveillance of injectable contraceptive  Restart depo  - HCG Qual, Urine ([id number])  - medroxyPROGESTERone (DEPO-PROVERA) injection 150 mg    2. Chronic daily headache  Work on improved sleep and suggested low sugar electrolyte water while working out.    3. Insomnia, unspecified type  Stop ambien and trial of lunesta.  - eszopiclone (LUNESTA) 1 MG tablet; Take 1-2 tablets (1-2 mg) by mouth At Bedtime  Dispense: 30 tablet; Refill: 1    4. Chronic pruritus  Monitor for now.    The uses and side effects, including black box warnings as appropriate, were discussed in detail.  All patient questions were answered.  The patient was instructed to call immediately if any side effects developed.       FUTURE APPOINTMENTS:       - Follow-up visit in 1 week if not improved.    Lisa Carney MD  [facility]

## 2019-03-05 NOTE — PATIENT INSTRUCTIONS
Try the G2 or other low calorie electrolyte water.  At [facility], we strive to deliver an exceptional experience to you, every time we see you.  If you receive a survey in the mail, please send us back your thoughts. We really do value your feedback.    Your care team:                            Family Medicine Internal Medicine   MD Josh Liu MD Shantel Branch-Fleming, MD Katya Georgiev PA-C Megan Hill, FIONA Menard, MD Pediatrics   ZA Porter, MD Kraen Carlin APRN CNP   MD Kim Curiel MD Deborah Mielke, MD Kim Thein, APRN [facility]      Clinic hours: Monday - Thursday 7 am-7 pm; Fridays 7 am-5 pm.   Urgent care: Monday - Friday 11 am-9 pm; Saturday and Sunday 9 am-5 pm.  Pharmacy : Monday -Thursday 8 am-8 pm; Friday 8 am-6 pm; Saturday and Sunday 9 am-5 pm.     Clinic: [phone]   Pharmacy: [phone]

## 2019-03-05 NOTE — NURSING NOTE
BP: 113/76    LAST PAP/EXAM:   Lab Results   Component Value Date    PAP NIL 04/24/2017     URINE HCG:negative    The following medication was given:     MEDICATION: Depo Provera 150mg  ROUTE: IM  SITE: RUQ - Gluteus  : hiogi       LOT #: [id number]  EXP:07/2020  NEXT INJECTION DUE: 5/20/19 - 6/3/19   Provider: Dr. Sharon Carney    Prior to injection, verified patient identity using patient's name and date of birth.  Due to injection administration, patient instructed to remain in clinic for 15 minutes  afterwards, and to report any adverse reaction to me immediately.      KENIA Stallworth MA

## 2019-03-11 DIAGNOSIS — G47.00 INSOMNIA, UNSPECIFIED TYPE: Primary | ICD-10-CM

## 2019-03-12 NOTE — TELEPHONE ENCOUNTER
Routing refill request to provider for review/approval because:  Drug not on the [facility] refill protocol   Drug not active on patient's medication list  See patient's message below regarding Lunesta and Zolpidem        Gallo Barnett RN, BSN

## 2019-03-12 NOTE — TELEPHONE ENCOUNTER
Requested Prescriptions   Pending Prescriptions Disp Refills     zolpidem (AMBIEN) 5 MG tablet [Pharmacy Med Name: ZOLPIDEM 5MG TABLETS]        Last Written Prescription Date:  01/14/19  Last Fill Quantity: 30,   # refills: 1  Last Office Visit: 03/04/19-Sharon Carney  Future Office visit:    Next 5 appointments (look out 90 days)    May 06, 2019  1:20 PM CDT  SHORT with Lisa Carney MD  [facility] [address] [place] [id number]  [phone]           Routing refill request to provider for review/approval because:  Drug not on the [facility], [facility] or  Health refill protocol or controlled substance 30 tablet 0     Sig: TAKE 1 TABLET BY MOUTH EVERY NIGHT AT BEDTIME    There is no refill protocol information for this order

## 2019-03-12 NOTE — TELEPHONE ENCOUNTER
..Reason for Call:  zolpidem    Detailed comments: checking status of refill / also informing she s not taking the lunesta as it leaves a horrible taste in her mouth and doesn't like the way it makes her feel, not working better the zolpidem    Phone Number Patient can be reached at: Home number on file [phone] (home)    Best Time: anytime    Can we leave a detailed message on this number? YES    Call taken on 3/12/2019 at 9:21 AM by Kira Todd

## 2019-03-13 RX ORDER — ZOLPIDEM TARTRATE 5 MG/1
TABLET ORAL
Qty: 30 TABLET | Refills: 3 | Status: SHIPPED | OUTPATIENT
Start: 2019-03-13 | End: 2019-06-28

## 2019-03-14 NOTE — TELEPHONE ENCOUNTER
zolpidem (AMBIEN) 5 MG tablet  RX faxed to [id number], [phone]; RF @ 2626.    Brenda FIORE (R))

## 2019-05-20 ENCOUNTER — ALLIED HEALTH/NURSE VISIT (OUTPATIENT)
Dept: NURSING | Facility: CLINIC | Age: 34
End: 2019-05-20
Payer: COMMERCIAL

## 2019-05-20 VITALS
WEIGHT: 137.8 LBS | RESPIRATION RATE: 16 BRPM | DIASTOLIC BLOOD PRESSURE: 73 MMHG | SYSTOLIC BLOOD PRESSURE: 111 MMHG | BODY MASS INDEX: 21.63 KG/M2 | HEART RATE: 60 BPM | HEIGHT: 67 IN

## 2019-05-20 DIAGNOSIS — Z30.42 ENCOUNTER FOR DEPO-PROVERA CONTRACEPTION: Primary | ICD-10-CM

## 2019-05-20 DIAGNOSIS — Z30.42 ENCOUNTER FOR SURVEILLANCE OF INJECTABLE CONTRACEPTIVE: Primary | ICD-10-CM

## 2019-05-20 PROCEDURE — 99207 ZZC NO CHARGE NURSE ONLY: CPT

## 2019-05-20 PROCEDURE — 96372 THER/PROPH/DIAG INJ SC/IM: CPT

## 2019-05-20 RX ORDER — MEDROXYPROGESTERONE ACETATE 150 MG/ML
150 INJECTION, SUSPENSION INTRAMUSCULAR
Status: ACTIVE | OUTPATIENT
Start: 2019-05-20 | End: 2020-05-14

## 2019-05-20 RX ADMIN — MEDROXYPROGESTERONE ACETATE 150 MG: 150 INJECTION, SUSPENSION INTRAMUSCULAR at 09:15

## 2019-05-20 ASSESSMENT — MIFFLIN-ST. JEOR: SCORE: 1372.69

## 2019-05-20 NOTE — PROGRESS NOTES
BP: 111/73    LAST PAP/EXAM:   Lab Results   Component Value Date    PAP NIL 04/24/2017     URINE HCG:not indicated    The following medication was given:     MEDICATION: Depo Provera 150mg  ROUTE: IM  SITE: LUQ - Gluteus  : Mylan  LOT #: [id number]  EXP:02/20  NEXT INJECTION DUE: 8/5/19 - 8/19/19   Provider: Sharon Carney

## 2019-06-27 ENCOUNTER — MYC REFILL (OUTPATIENT)
Dept: FAMILY MEDICINE | Facility: CLINIC | Age: 34
End: 2019-06-27

## 2019-06-27 DIAGNOSIS — G47.00 INSOMNIA, UNSPECIFIED TYPE: ICD-10-CM

## 2019-06-28 RX ORDER — ZOLPIDEM TARTRATE 5 MG/1
5 TABLET ORAL AT BEDTIME
Qty: 30 TABLET | Refills: 3 | OUTPATIENT
Start: 2019-06-28

## 2019-06-28 NOTE — TELEPHONE ENCOUNTER
Routing refill request to provider for review/approval because:  Provider switched medications on 3/4/19. Medication list does not reflect that. Refused by Dr Hernandez on 6/18/19. Provider to review.    3. Insomnia, unspecified type  Stop ambien and trial of lunesta.  - eszopiclone (LUNESTA) 1 MG tablet; Take 1-2 tablets (1-2 mg) by mouth At Bedtime  Dispense: 30 tablet; Refill: 1    Justa Rodriguez RN, [facility]

## 2019-07-02 RX ORDER — ZOLPIDEM TARTRATE 5 MG/1
5 TABLET ORAL AT BEDTIME
Qty: 30 TABLET | Refills: 3 | Status: SHIPPED | OUTPATIENT
Start: 2019-07-02 | End: 2019-11-25

## 2019-07-09 DIAGNOSIS — I34.0 NONRHEUMATIC MITRAL VALVE INSUFFICIENCY: Primary | ICD-10-CM

## 2019-08-02 ENCOUNTER — ANCILLARY PROCEDURE (OUTPATIENT)
Dept: CARDIOLOGY | Facility: CLINIC | Age: 34
End: 2019-08-02
Attending: INTERNAL MEDICINE
Payer: COMMERCIAL

## 2019-08-02 DIAGNOSIS — I34.0 NONRHEUMATIC MITRAL VALVE INSUFFICIENCY: ICD-10-CM

## 2019-08-02 PROCEDURE — 93306 TTE W/DOPPLER COMPLETE: CPT | Performed by: INTERNAL MEDICINE

## 2019-08-05 ENCOUNTER — MYC MEDICAL ADVICE (OUTPATIENT)
Dept: FAMILY MEDICINE | Facility: CLINIC | Age: 34
End: 2019-08-05

## 2019-08-12 ENCOUNTER — ALLIED HEALTH/NURSE VISIT (OUTPATIENT)
Dept: NURSING | Facility: CLINIC | Age: 34
End: 2019-08-12
Payer: COMMERCIAL

## 2019-08-12 VITALS
WEIGHT: 137 LBS | HEIGHT: 67 IN | OXYGEN SATURATION: 99 % | BODY MASS INDEX: 21.5 KG/M2 | DIASTOLIC BLOOD PRESSURE: 68 MMHG | SYSTOLIC BLOOD PRESSURE: 112 MMHG | HEART RATE: 60 BPM | TEMPERATURE: 98.9 F

## 2019-08-12 DIAGNOSIS — Z30.42 ENCOUNTER FOR DEPO-PROVERA CONTRACEPTION: Primary | ICD-10-CM

## 2019-08-12 PROCEDURE — 99207 ZZC NO CHARGE NURSE ONLY: CPT

## 2019-08-12 PROCEDURE — 96372 THER/PROPH/DIAG INJ SC/IM: CPT

## 2019-08-12 RX ADMIN — MEDROXYPROGESTERONE ACETATE 150 MG: 150 INJECTION, SUSPENSION INTRAMUSCULAR at 16:25

## 2019-08-12 ASSESSMENT — PAIN SCALES - GENERAL: PAINLEVEL: NO PAIN (0)

## 2019-08-12 ASSESSMENT — MIFFLIN-ST. JEOR: SCORE: 1369.06

## 2019-08-12 NOTE — NURSING NOTE
BP: 112/68    LAST PAP/EXAM:   Lab Results   Component Value Date    PAP NIL 04/24/2017     URINE HCG:not indicated    The following medication was given:     MEDICATION: Depo Provera 150mg  ROUTE: IM  SITE: Ventrogluteal - Right  : MYLAN  LOT #: [id number]  EXP:1/21  NDC [id number]  NEXT INJECTION DUE: 10/28/19 - 11/11/19   Provider: Dr. Weller    NO MOOD CHANGES PER PATIENT    JONNATHAN Ortega MA

## 2019-08-12 NOTE — LETTER
Depo Provera (MedroxyProgresterone) Reminder for:    Cruz Obregon was given the Depo-Provera (MedroxyProgesterone) contraception injection on: 08/12/19             Next injection is due: Oct. 28 - Nov. 11  Primary Provider:  CHIQUITA Ortega MA

## 2019-08-19 ENCOUNTER — MYC MEDICAL ADVICE (OUTPATIENT)
Dept: FAMILY MEDICINE | Facility: CLINIC | Age: 34
End: 2019-08-19

## 2019-08-31 ENCOUNTER — OFFICE VISIT (OUTPATIENT)
Dept: URGENT CARE | Facility: URGENT CARE | Age: 34
End: 2019-08-31
Payer: COMMERCIAL

## 2019-08-31 VITALS
DIASTOLIC BLOOD PRESSURE: 73 MMHG | TEMPERATURE: 98.6 F | SYSTOLIC BLOOD PRESSURE: 123 MMHG | BODY MASS INDEX: 21.54 KG/M2 | OXYGEN SATURATION: 99 % | WEIGHT: 137.5 LBS | HEART RATE: 55 BPM | RESPIRATION RATE: 20 BRPM

## 2019-08-31 DIAGNOSIS — R51.9 ACUTE INTRACTABLE HEADACHE, UNSPECIFIED HEADACHE TYPE: ICD-10-CM

## 2019-08-31 DIAGNOSIS — R07.9 ACUTE CHEST PAIN: Primary | ICD-10-CM

## 2019-08-31 DIAGNOSIS — M79.622 PAIN OF LEFT UPPER ARM: ICD-10-CM

## 2019-08-31 DIAGNOSIS — Z86.79 HISTORY OF CARDIAC MURMUR: ICD-10-CM

## 2019-08-31 PROCEDURE — 93000 ELECTROCARDIOGRAM COMPLETE: CPT | Performed by: PHYSICIAN ASSISTANT

## 2019-08-31 PROCEDURE — 99215 OFFICE O/P EST HI 40 MIN: CPT | Performed by: PHYSICIAN ASSISTANT

## 2019-08-31 NOTE — PROGRESS NOTES
S: 33-year-old female presents for evaluation of bilateral frontal headache for 4 to 5 days.  She wakes up with it in the morning.  She rates it 10/10.  She has had a history of headaches in the past.  She tried Excedrin migraine without any significant relief.  She does note photophobia.  She has a family history of migraines.  Occasional dizziness.  Positive for nausea, no vomiting.  No shortness of breath.  She also states that she has had left-sided chest tightness with aching into the left arm for 3 to 4 days.  It was worse yesterday.  She does have it currently.  She occasionally feels like her heart is racing.  When she takes a deep breath then it feels like she is a corset around her chest.  She denies any lower extremity leg swelling or calf pain.  No recent travel.  No recent surgery.      H/O heart murmur and mild mitral and tricuspid insufficiency.  Has had recent echocardiogram.    Allergies   Allergen Reactions     Sulfamethoxazole Rash       Past Medical History:   Diagnosis Date     ASCUS on Pap smear 11/17/10    + LR HPV     ASCUS on Pap smear 9/10/12    negative HPV     ASCUS with positive high risk HPV 1/4/12    + HR HPV and LR HPV     Chronic daily headache 10/27/2011     H/O colposcopy with cervical biopsy 1/30/12    WNL     H/O colposcopy with cervical biopsy 2/26/13    WNL     Heart disease     Heart Murmur     Heart murmur     Mitral regurg     High risk HPV infection 12/12/12    normal pap, + HR HPV (52)     Mild Mitral insufficiency 1/4/2011     Mild Tricuspid insufficiency 1/4/2011     Narcolepsy     Diagnosed by sleep specialist based In [place]     Oligohydramnios - delivered 2009    induced @ 37 weeks     Unspecified hemorrhoids without mention of complication          Current Outpatient Medications on File Prior to Visit:  zolpidem (AMBIEN) 5 MG tablet Take 1 tablet (5 mg) by mouth At Bedtime     Current Facility-Administered Medications on File Prior to Visit:  medroxyPROGESTERone  (DEPO-PROVERA) injection 150 mg   medroxyPROGESTERone (DEPO-PROVERA) injection 150 mg       Social History     Tobacco Use     Smoking status: Never Smoker     Smokeless tobacco: Never Used   Substance Use Topics     Alcohol use: No     Alcohol/week: 0.0 oz       ROS:  CONSTITUTIONAL: Negative for fatigue or fever.  EYES: Negative for eye problems.  ENT: neg.  RESP: neg.  CV: as above.  GI: Negative for vomiting.  MUSCULOSKELETAL:  Negative for significant muscle or joint pains.  NEUROLOGIC: as above.  SKIN: Negative for rash.  Psych- nl mentation    OBJECTIVE:  /73 (BP Location: Right arm, Patient Position: Chair, Cuff Size: Adult Small)   Pulse 55   Temp 98.6  F (37  C) (Oral)   Resp 20   Wt 62.4 kg (137 lb 8 oz)   SpO2 99%   Breastfeeding? No   BMI 21.54 kg/m      GENERAL APPEARANCE: Patient sitting in dark room with blanket covering her head.    EYES:Conjunctiva/sclera clear.  Pupils equal reactive to light and accommodation.  EOMs intact.  SINUSES: No maxillary sinus tenderness.  THROAT: No erythema w/o tonsillar enlargement . No exudates.  NECK: Supple, nontender, no lymphadenopathy.  Full range of motion of her neck.  RESP: Lungs clear to auscultation - no rales, rhonchi or wheezes  CV: Regular rate and rhythm, normal S1 S2, no murmur noted.  NEURO: Awake, alert    SKIN: No rashes  Shoulder joints-nontender.  Negative pronator drift.  Smile symmetric  No tongue deviation  No slurred speech.    EKG today- rate 58. Sinus mike. WNL.  EKG from September 2, 2014 showed sinus rhythm with inferior and anterior T wave changes, nonspecific.    Echo cardiogram done August 2, 2019.  Report reads compared to previous study no sick significant change.  Mild to moderate mitral regurgitation.  Mild tricuspid and pulmonic valve insufficiency.  EF 60 to 65%.          ASSESSMENT:     ICD-10-CM    1. Acute chest pain R07.9 EKG 12-lead complete w/read - Clinics   2. Pain of left upper arm M79.622    3. Acute  intractable headache, unspecified headache type R51    4. History of cardiac murmur Z86.79            PLAN: I think headache and chest pain are likely separate issues.  Feel like the headache is consistent with migraine.  To ER for further evaluation for her headache and chest pain.  Will send copies of echocardiogram and EKGs with patient.  I am unable to fully evaluate her chest pain as we do not have a troponin or d-dimer.  These can be done in the ER per their discretion.    Maria Fernanda Fisher PA-C

## 2019-08-31 NOTE — PATIENT INSTRUCTIONS
To ER for further evaluation for her headache and chest pain.  Will send copies of echocardiogram and EKGs with patient.  I am unable to fully evaluate as we do not have a troponin or d-dimer.  These can be done in the ER per their discretion.

## 2019-09-12 ENCOUNTER — OFFICE VISIT (OUTPATIENT)
Dept: FAMILY MEDICINE | Facility: CLINIC | Age: 34
End: 2019-09-12
Payer: COMMERCIAL

## 2019-09-12 VITALS
RESPIRATION RATE: 16 BRPM | HEART RATE: 56 BPM | SYSTOLIC BLOOD PRESSURE: 117 MMHG | BODY MASS INDEX: 21.16 KG/M2 | HEIGHT: 67 IN | WEIGHT: 134.8 LBS | TEMPERATURE: 98.3 F | OXYGEN SATURATION: 99 % | DIASTOLIC BLOOD PRESSURE: 70 MMHG

## 2019-09-12 DIAGNOSIS — M62.830 BACK MUSCLE SPASM: ICD-10-CM

## 2019-09-12 DIAGNOSIS — G44.211 INTRACTABLE EPISODIC TENSION-TYPE HEADACHE: Primary | ICD-10-CM

## 2019-09-12 PROCEDURE — 99214 OFFICE O/P EST MOD 30 MIN: CPT | Performed by: FAMILY MEDICINE

## 2019-09-12 RX ORDER — METHOCARBAMOL 500 MG/1
500 TABLET, FILM COATED ORAL 4 TIMES DAILY PRN
Qty: 90 TABLET | Refills: 0 | Status: SHIPPED | OUTPATIENT
Start: 2019-09-12 | End: 2019-10-30

## 2019-09-12 ASSESSMENT — PAIN SCALES - GENERAL: PAINLEVEL: MODERATE PAIN (4)

## 2019-09-12 ASSESSMENT — MIFFLIN-ST. JEOR: SCORE: 1359.08

## 2019-09-12 NOTE — PROGRESS NOTES
"Subjective     Cruz Obregon is a 33 year old female who presents to clinic today for the following health issues:    HPI   Headache  Onset: 3-4 weeks     Description:   Location: Starts in the front of the head and on the worse days it ends behind the ear   Character: throbbing pain  Frequency:  Constant    Duration:  All day    Intensity: mild, severe    Progression of Symptoms:  worsening    Accompanying Signs & Symptoms:  Stiff neck: no  Neck or upper back pain: YES- Starting to get the neck pain and chest pain  Fever: no  Sinus pressure: no  Nausea or vomiting: YES  Dizziness: YES  Numbness: no  Weakness: no  Visual changes: YES- Sometimes the light effects it to the point she wants all lights off     History:   Head trauma: no  Family history of migraines: YES  Previous tests for headaches: no  Neurologist evaluations: no  Able to do daily activities: YES  Wake with a headaches: YES  Do headaches wake you up: no  Daily pain medication use: YES  Work/school stressors/changes: no    Precipitating factors:   Does light make it worse: YES- When it is at its worst  Does sound make it worse: YES- Sometimes     Alleviating factors:  Does sleep help: YES    Therapies Tried and outcome: Benadryl, Ibuprofen (Advil, Motrin), Naproxyn (Aleve) and Excedrin- Little to no relief     Back spasms have returned as well over the last few weeks.  Thinks it is related to lifting at work.    Reviewed and updated as needed this visit by Provider  Tobacco  Allergies  Meds  Problems  Med Hx  Surg Hx  Fam Hx         Review of Systems   ROS COMP: Constitutional, HEENT, cardiovascular, pulmonary, gi and gu systems are negative, except as otherwise noted.      Objective    /70 (BP Location: Left arm, Patient Position: Sitting, Cuff Size: Adult Regular)   Pulse 56   Temp 98.3  F (36.8  C) (Oral)   Resp 16   Ht 1.702 m (5' 7\")   Wt 61.1 kg (134 lb 12.8 oz)   LMP  (LMP Unknown)   SpO2 99%   Breastfeeding? No   BMI " 21.11 kg/m    Body mass index is 21.11 kg/m .  Physical Exam   GENERAL: healthy, alert and no distress  EYES: Eyes grossly normal to inspection, PERRL and conjunctivae and sclerae normal  HENT: ear canals and TM's normal, nose and mouth without ulcers or lesions  NECK: no adenopathy, no asymmetry, masses, or scars and thyroid normal to palpation  RESP: lungs clear to auscultation - no rales, rhonchi or wheezes  CV: regular rate and rhythm, normal S1 S2, no S3 or S4, no murmur, click or rub, no peripheral edema and peripheral pulses strong  ABDOMEN: soft, nontender, no hepatosplenomegaly, no masses and bowel sounds normal  MS: no gross musculoskeletal defects noted, no edema  SKIN: no suspicious lesions or rashes  NEURO: Normal strength and tone, mentation intact and speech normal  PSYCH: mentation appears normal, affect normal/bright    Diagnostic Test Results:  Labs reviewed in Epic        Assessment & Plan     1. Intractable episodic tension-type headache  Trial of schedule methocarbamol for the next 2 days then slow tapering.  Contact me tomorrow if not improved.    2. Back muscle spasm  As above.  - methocarbamol (ROBAXIN) 500 MG tablet; Take 1 tablet (500 mg) by mouth 4 times daily as needed for muscle spasms (and tension headaches) For back pain  Dispense: 90 tablet; Refill: 0       The uses and side effects, including black box warnings as appropriate, were discussed in detail.  All patient questions were answered.  The patient was instructed to call immediately if any side effects developed.     Return in about 3 days (around 9/15/2019), or if symptoms worsen or fail to improve.    Lisa Carney MD  [facility]

## 2019-09-12 NOTE — PATIENT INSTRUCTIONS
Patient Education     What Are Migraine and Tension Headaches?    Although there are several types of headaches, migraine and tension headaches affect the most people. When you have a headache, it isn't your brain that's hurting. Your head aches because nerves in the bones, blood vessels, meninges, and muscles of your head are irritated. These irritated nerves send pain signals to the brain, which identifies where you hurt and how bad the pain is.  Talk with your healthcare provider about a treatment plan that may help relieve pain and prevent future headaches.   What causes your headache?  The actual headache process is not yet understood. Only rarely are headaches a sign of a serious medical problem such as a tumor. Headache pain may be caused by abnormal interaction between the brain and the nerves and blood vessels in the head. A previous head injury or concussion, neck pain, environmental stresses, muscle tension, anxiety, depression, fatigue, skipping meals, or certain foods and drinks may trigger headache pain.  Brain scans are rarely needed and only for certain danger sign symptoms. CT scans are associated with potential radiation effects and potential inaccurate false findings.  What is referred pain?  Headache pain can be referred pain, which is pain that has its source in one place but is felt in another. For example, pain behind the eyes may actually be caused by tense muscles in the neck and shoulders. This means that the place that hurts may not be the part of the body that needs treatment.  Is it a migraine?  Migraine is a vascular headache that causes throbbing pain felt on one (most common) or both sides (less common) of the head. You may feel nauseated or vomit. This headache may also be preceded or associated with changes in sight (like seeing spots or flashes of light), ability to speak, or sensation (aura). There are a wide variety of environmental and food-related triggers for migraines. The  "pain may last for 4 to 72 hours. Afterward, you may feel shaky for a day or so. If this is the first time you experience these symptoms, you should immediately seek medical attention because you could be having a stroke.  Is it a tension headache?  This type of headache is usually a dull ache or a sensation of pressure on both sides of the head. It may be associated with pain or tension in the neck and shoulders. Depression, anxiety, and stress can cause a tension headache. The pain may not have a definite beginning or end. It may come and go, or seem never to go away.  When to call the healthcare provider  Call your healthcare provider for headaches that happen along with any of these symptoms:    Sudden, severe headache that is different from your usual headache pain    Headache associated with fever    Sudden headache associated with stiff neck    Slurred speech    Recurring headache in children     Ongoing numbness or muscle weakness    Loss of vision    Pain following a head injury    Convulsions, or a change in mental awareness    A headache you would call \"the worst headache you've ever had\"    New headaches in a pregnant woman   Date Last Reviewed: 1/1/2018 2000-2018 [employer]. [address] [place] [address]. All rights reserved. This information is not intended as a substitute for professional medical care. Always follow your healthcare professional's instructions.           Patient Education     Tension Headaches     Massaging your neck muscles can help relieve tension headache pain.     Tension headaches cause a dull, steady pain on both sides of the head and in the neck and the back of the head. The eyes may also feel tired. Tension headaches can be triggered by muscle tension and clenching, lack of sleep, poor posture, eyestrain, stress, depression, anxiety, arthritis of the neck, and other factors.  To help prevent tension headaches  Take the following steps:    Make sure " your work area is properly set up to help you avoid neck strain and eyestrain.    Make sure that your eyeglass prescription is current and is appropriate for the work you do.    Learn techniques for relaxing and reducing emotional stress. These include deep breathing, progressive relaxation, yoga, meditation, and biofeedback.    Maintain a regular exercise regimen under the guidance of a doctor. This can help keep your neck and back flexible, strong, and relaxed.  To relieve the pain  Take the following steps:    Use moist heat to relax the muscles. Soak in a hot bath or wrap a warm, moist towel around your neck.    Brush your scalp lightly with a soft hairbrush.    Give yourself a massage. Knead the muscles running from your shoulders up the back of your skull.    Use an ice pack. Apply this directly to the place where you feel pain.    Rest. Sleeping often helps relieve headache pain.    Drink plenty of fluids. Dehydration is another trigger for headaches. Don't drink alcohol as this will worsen dehydration.    Use pain medicine sparingly for moderate to severe pain.   Date Last Reviewed: 12/1/2017 2000-2018 [employer]. [address] [place] [id number]. All rights reserved. This information is not intended as a substitute for professional medical care. Always follow your healthcare professional's instructions.

## 2019-09-13 ENCOUNTER — OFFICE VISIT (OUTPATIENT)
Dept: OPTOMETRY | Facility: CLINIC | Age: 34
End: 2019-09-13
Payer: COMMERCIAL

## 2019-09-13 DIAGNOSIS — Z01.00 EXAMINATION OF EYES AND VISION: Primary | ICD-10-CM

## 2019-09-13 DIAGNOSIS — H52.03 HYPEROPIA, BILATERAL: ICD-10-CM

## 2019-09-13 PROCEDURE — 92014 COMPRE OPH EXAM EST PT 1/>: CPT | Performed by: OPTOMETRIST

## 2019-09-13 PROCEDURE — 92015 DETERMINE REFRACTIVE STATE: CPT | Performed by: OPTOMETRIST

## 2019-09-13 ASSESSMENT — VISUAL ACUITY
METHOD: SNELLEN - LINEAR
CORRECTION_TYPE: GLASSES
OD_CC: 20/20
OS_CC: 20/20
OD_CC+: -1
OS_CC: 20/20
OD_CC: 20/20

## 2019-09-13 ASSESSMENT — KERATOMETRY
OD_K2POWER_DIOPTERS: 7.62
OD_K1POWER_DIOPTERS: 7.77
OS_K2POWER_DIOPTERS: 7.73
OS_AXISANGLE2_DEGREES: 25
OS_K1POWER_DIOPTERS: 7.83
OD_AXISANGLE2_DEGREES: 180

## 2019-09-13 ASSESSMENT — REFRACTION_WEARINGRX
SPECS_TYPE: BIFOCAL
OS_SPHERE: +1.50
OD_CYLINDER: SPHERE
OD_ADD: +0.75
OD_SPHERE: +1.25
OS_SPHERE: +1.50
OS_CYLINDER: SPHERE
OS_ADD: +0.75
OD_CYLINDER: SPHERE
OD_SPHERE: +1.25
OS_ADD: +0.75
OD_ADD: +0.75
OS_CYLINDER: SPHERE

## 2019-09-13 ASSESSMENT — REFRACTION_MANIFEST
OS_SPHERE: +1.50
OD_SPHERE: +1.50
OD_ADD: +1.00
OS_CYLINDER: +0.25
OS_ADD: +1.00
OS_AXIS: 011

## 2019-09-13 ASSESSMENT — TONOMETRY
IOP_METHOD: TONOPEN
OS_IOP_MMHG: 15
OD_IOP_MMHG: 18

## 2019-09-13 ASSESSMENT — EXTERNAL EXAM - LEFT EYE: OS_EXAM: NORMAL

## 2019-09-13 ASSESSMENT — SLIT LAMP EXAM - LIDS
COMMENTS: NORMAL
COMMENTS: NORMAL

## 2019-09-13 ASSESSMENT — CONF VISUAL FIELD
OD_NORMAL: 1
OS_NORMAL: 1
METHOD: COUNTING FINGERS

## 2019-09-13 ASSESSMENT — EXTERNAL EXAM - RIGHT EYE: OD_EXAM: NORMAL

## 2019-09-13 ASSESSMENT — CUP TO DISC RATIO
OS_RATIO: 0.35
OD_RATIO: 0.35

## 2019-09-13 NOTE — PATIENT INSTRUCTIONS
Eyeglass prescription given.    Recommend returning for dilated fundus exam. It is a more thorough exam of the retina.    Return in 1 year for a complete eye exam or sooner if needed.    Jarret Matson, OD

## 2019-09-13 NOTE — PROGRESS NOTES
Chief Complaint   Patient presents with     Annual Eye Exam         Last Eye Exam: 6/5/2018  Dilated Previously: Yes, and does not want to be dilated today.     What are you currently using to see?  Glasses, and decided against the fitting today.        Distance Vision Acuity: No changes to vision, doesn't really wear glasses, give her a headache     Near Vision Acuity: Satisfied with vision while reading, rarely uses glasses     Eye Comfort:sore at times   Do you use eye drops? 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       OBJECTIVE: See Ophthalmology exam    ASSESSMENT:    ICD-10-CM    1. Examination of eyes and vision Z01.00 EYE EXAM (SIMPLE-NONBILLABLE)     REFRACTION   2. Hyperopia, bilateral H52.03 EYE EXAM (SIMPLE-NONBILLABLE)     REFRACTION      PLAN:     Patient Instructions   Eyeglass prescription given.    Recommend returning for dilated fundus exam. It is a more thorough exam of the retina.    Return in 1 year for a complete eye exam or sooner if needed.    Jarret Matson, OD

## 2019-09-23 ENCOUNTER — MYC MEDICAL ADVICE (OUTPATIENT)
Dept: FAMILY MEDICINE | Facility: CLINIC | Age: 34
End: 2019-09-23

## 2019-09-23 DIAGNOSIS — G44.211 INTRACTABLE EPISODIC TENSION-TYPE HEADACHE: Primary | ICD-10-CM

## 2019-10-10 ENCOUNTER — OFFICE VISIT (OUTPATIENT)
Dept: CARDIOLOGY | Facility: CLINIC | Age: 34
End: 2019-10-10
Payer: COMMERCIAL

## 2019-10-10 VITALS
DIASTOLIC BLOOD PRESSURE: 80 MMHG | HEIGHT: 67 IN | HEART RATE: 88 BPM | SYSTOLIC BLOOD PRESSURE: 126 MMHG | WEIGHT: 139.8 LBS | BODY MASS INDEX: 21.94 KG/M2

## 2019-10-10 DIAGNOSIS — I34.0 MITRAL VALVE INSUFFICIENCY, UNSPECIFIED ETIOLOGY: Primary | ICD-10-CM

## 2019-10-10 PROCEDURE — 99214 OFFICE O/P EST MOD 30 MIN: CPT | Performed by: INTERNAL MEDICINE

## 2019-10-10 ASSESSMENT — MIFFLIN-ST. JEOR: SCORE: 1376.76

## 2019-10-10 ASSESSMENT — PAIN SCALES - GENERAL: PAINLEVEL: MODERATE PAIN (4)

## 2019-10-10 NOTE — PROGRESS NOTES
October 10, 2019            Lisa Carney MD   [address] [address]      PATIENT:  Cruz Obregon   MRN  [id number]   :  1985      Dear Dr. Weller:      It was a pleasure participating in the care of your patient, . Cruz Obregon.  As you know, she is a 34-year-old lady whom I see today for mitral insufficiency.      Her past medical history is significant for the followin.  Sleep disorder, possible narcolepsy.   2.  HPV.   3.  Back pain.   4.  Positive CAMILO.   5.  Bacterial vaginosis.   6.  Gastroesophageal reflux disease.      I saw her back in  for musculoskeletal chest discomfort, which she has been having chronically since then.  It is worse when she takes a deep breath or puffs chest out and really has not gotten any worse or better.  She cannot recall anything that really aggravates, but she continues to notice it.      She otherwise is able to exercise and perform cardio workouts regularly during the week.  She exercises 15 minutes on the treadmill and 15 minutes on the exercise bike without symptoms.  She denies PND, orthopnea, edema, palpitations, syncope or near-syncope.      She has no other complaints.      PRESENT MEDICATIONS:  Robaxin, Ambien, Depo-Provera.      PHYSICAL EXAMINATION:     VITAL SIGNS:  Blood pressure is 126/80 with a pulse of 88.  Her weight is 139 pounds.   NECK:  Exam reveals no obvious jugular venous distention.   LUNGS:  Clear to auscultation.  Respiratory effort is normal.   CARDIAC:  Reveals a regular rhythm.  No obvious murmur or gallop appreciated.   ABDOMEN:  Belly soft, nontender.   EXTREMITIES:  Without gross edema.      When she takes a deep breath, she can recreate the exact quality and location of her symptoms.        IMPRESSION:      Cruz is a 34-year-old lady with a history of chronic musculoskeletal chest discomfort since , who is also followed for mild  mitral prolapse with mild-to-moderate mitral insufficiency.      Her chest discomfort is unchanged and gets worse when she takes a deep breath or puffs her chest out.  She is attempting to isolate what physical activities aggravate her discomfort over time.      Additionally, her echocardiogram on 2019 shows no significant change from prior, and she has a mild-to-moderate degree of mitral insufficiency with mild prolapse that we are monitoring.  She is asymptomatic from a clinical standpoint.  It reveals an ejection fraction of 60-65%.        PLAN:     1.  Continue present course.     2.  Recheck echocardiogram in 1 year with followup at that time or earlier if needed.      Once again, it was a pleasure to participate in the care of your patient, Mr. Cruz Obregon.  Please feel free to contact me anytime if you have any questions regarding her care in the future.         Sincerely,      BANDAR HEWITT MD             D: 10/10/2019   T: 10/10/2019   MT: JEANNIE      Name:     CRUZ OBREGON   MRN:      [id number]        Account:      [id number]   :      1985      Document: [id number]       cc: Lisa Carney MD

## 2019-10-10 NOTE — NURSING NOTE
"Cruz Obregon's goals for this visit include:   Chief Complaint   Patient presents with     RECHECK     2 month follow up, mtral valve insufficiency       She requests these members of her care team be copied on today's visit information: no    PCP: Lisa Montana    Referring Provider:  No referring provider defined for this encounter.    /80 (BP Location: Left arm, Patient Position: Sitting, Cuff Size: Adult Regular)   Pulse 88   Ht 1.702 m (5' 7\")   Wt 63.4 kg (139 lb 12.8 oz)   LMP  (LMP Unknown)   BMI 21.90 kg/m      Do you need any medication refills at today's visit? no    "

## 2019-10-25 ENCOUNTER — PRE VISIT (OUTPATIENT)
Dept: NEUROLOGY | Facility: CLINIC | Age: 34
End: 2019-10-25

## 2019-10-25 NOTE — TELEPHONE ENCOUNTER
October 25, 2019              Ana Lyn CMA     Chief Complaint   Patient presents with     Previsit     Completed       Pre-Visit Documentation: Cruz Obregon is a 34 year old female  Patient stated she has not had any imaging done of her head and has never seen a neurologist before    Current Outpatient Medications   Medication Sig Dispense Refill     methocarbamol (ROBAXIN) 500 MG tablet Take 1 tablet (500 mg) by mouth 4 times daily as needed for muscle spasms (and tension headaches) For back pain 90 tablet 0     zolpidem (AMBIEN) 5 MG tablet Take 1 tablet (5 mg) by mouth At Bedtime 30 tablet 3       ASSESSMENT / PLAN:  No diagnosis found.      Ana Lyn CMA

## 2019-10-30 ENCOUNTER — OFFICE VISIT (OUTPATIENT)
Dept: NEUROLOGY | Facility: CLINIC | Age: 34
End: 2019-10-30
Attending: FAMILY MEDICINE
Payer: COMMERCIAL

## 2019-10-30 VITALS
BODY MASS INDEX: 22.96 KG/M2 | WEIGHT: 146.6 LBS | DIASTOLIC BLOOD PRESSURE: 80 MMHG | HEART RATE: 72 BPM | SYSTOLIC BLOOD PRESSURE: 123 MMHG

## 2019-10-30 DIAGNOSIS — G43.009 MIGRAINE WITHOUT AURA AND WITHOUT STATUS MIGRAINOSUS, NOT INTRACTABLE: Primary | ICD-10-CM

## 2019-10-30 PROCEDURE — 99204 OFFICE O/P NEW MOD 45 MIN: CPT | Performed by: PSYCHIATRY & NEUROLOGY

## 2019-10-30 RX ORDER — SUMATRIPTAN 50 MG/1
50 TABLET, FILM COATED ORAL
Qty: 9 TABLET | Refills: 1 | Status: SHIPPED | OUTPATIENT
Start: 2019-10-30 | End: 2020-08-18

## 2019-10-30 RX ORDER — NORTRIPTYLINE HCL 10 MG
10 CAPSULE ORAL SEE ADMIN INSTRUCTIONS
Qty: 90 CAPSULE | Refills: 1 | Status: SHIPPED | OUTPATIENT
Start: 2019-10-30 | End: 2019-12-04

## 2019-10-30 ASSESSMENT — PAIN SCALES - GENERAL: PAINLEVEL: NO PAIN (1)

## 2019-10-30 NOTE — LETTER
10/30/2019         RE: Cruz Obregon  [address]  [facility] [address]        Dear Colleague,    Thank you for referring your patient, Cruz Obregon, to the [facility]. Please see a copy of my visit note below.    Visit Date:   10/30/2019      NEUROLOGY CONSULTATION      HISTORY OF PRESENT ILLNESS:  This patient is a 34-year-old right-handed woman seen for evaluation of headache.  She has a headache pretty much every day.  The headache typically begins behind her ears and at the base of her neck.  It will build up over the day and come over the top of her head.  If it is a stressful day, this process will go quickly.  If it is not a stressful day, this process takes place slowly throughout the day.  Two or three times a week, the headache will reach an extreme level.  She will have to go into a dark quiet room and try and sleep.  Yesterday, she had such a headache.  It was centered in and around the left eye.  It was a sharp pain with pounding and pressure qualities.  Light can be quite irritating to her.  In 08/19, she was in the Emergency Department for such a headache.  She was given a cocktail of Decadron, Reglan and Benadryl, which did not do a whole lot.  She was having some chest pain with the headache.  The headaches are worse with stress.  As noted, she has a headache pretty much every day.  She has never tried Imitrex or preventive agents.  She has used Tylenol, Excedrin and Excedrin Migraine.  These do not help.  She does not drink caffeine.  Excedrin may take the edge off the headache.  She has never been on a preventive as noted.  When she has the photophobia, phonophobia and nausea, she will have to go to bed.  She does get occasional blind spots in her vision with the headache.  These can last up to all day.  She has no problem with hearing, speech or swallowing.  Occasionally, she may get a numbness in her arms but otherwise has no symptoms in her arms or legs.     "  Her sleep is not good as a rule.  She has a difficult time getting to sleep.  She cannot \"turn her mind off.\"  Her diet could be improved.  She does not really exercise on a regular basis.      PAST MEDICAL HISTORY:  Largely noncontributory.  She does not have high blood pressure, diabetes, thyroid or asthma.  She was put on a trial of methocarbamol, which did not help.  She is on medroxyprogesterone.  She has not had pertinent surgery or trauma to the head or neck.  She does not drink or smoke.  She is not pregnant.      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      FAMILY HISTORY:  Positive for migraine in her father.      PHYSICAL EXAMINATION:   GENERAL:  The patient is cooperative and in no distress.   VITAL SIGNS:  Her blood pressure is 123/80.   NECK:  There are no carotid bruits.   HEART:  Auscultation of the heart shows S1 and S2.   NEUROLOGIC:  The patient is alert, oriented and lucid.   CRANIAL NERVES:  Show full visual fields to confrontation.  Funduscopic exam shows sharp discs bilaterally.  Visual acuity is 20/20 bilaterally.  Eye movements are complete and conjugate without nystagmus.  Pupils react to light.  Facial sensation is normal.  Face moves symmetrically.  Palate elevates in the midline.  Tongue protrudes in the midline.   MOTOR:  Evaluation shows no pronator drift, normal finger tapping, finger-nose-finger and heel-knee-shin.  The patient has good strength in the arms, hands and legs.  Muscle stretch reflexes are normal-reduced and symmetric.  Toes are downgoing.   SENSORY:  Exam shows preserved vibration and temperature.   GAIT, STATION AND COORDINATION:  Romberg sign is absent.  She can walk on her heels, toes and tandem.      She did see Dr. Jackson years ago for right arm and hand numbness.  She had an MRI scan of the brain performed in 2011 that was unremarkable.      ASSESSMENT:  Mixed headache disorder, including migraine and tension " headache.      DISCUSSION:  The patient is seen for evaluation of headache.  Her exam is normal.  The description sounds most like migraine with tension headache.  For treatment, I am going to prescribe her preventive nortriptyline, building up to 30 mg at night.  The side effects were reviewed.  Hopefully, this will prevent the headache and improve her sleep.  I am also giving her a prescription for sumatriptan 50 mg to take on an interventional basis.  Side effects were reviewed.  I will see her in followup in a month to review her status.  I encouraged her to call if she had questions or problems before followup.  If these measures are ineffective, she may need referral to the Headache Clinic.         ANSHUL DUVAL MD             D: 10/30/2019   T: 10/30/2019   MT:       Name:     JOSELUIS PEREIRA   MRN:      [id number]        Account:      [id number]   :      1985           Visit Date:   10/30/2019      Document: [id number]       cc: Lisa Carney MD      Again, thank you for allowing me to participate in the care of your patient.        Sincerely,        Anshul Duval MD

## 2019-10-30 NOTE — PROGRESS NOTES
"Visit Date:   10/30/2019      NEUROLOGY CONSULTATION      HISTORY OF PRESENT ILLNESS:  This patient is a 34-year-old right-handed woman seen for evaluation of headache.  She has a headache pretty much every day.  The headache typically begins behind her ears and at the base of her neck.  It will build up over the day and come over the top of her head.  If it is a stressful day, this process will go quickly.  If it is not a stressful day, this process takes place slowly throughout the day.  Two or three times a week, the headache will reach an extreme level.  She will have to go into a dark quiet room and try and sleep.  Yesterday, she had such a headache.  It was centered in and around the left eye.  It was a sharp pain with pounding and pressure qualities.  Light can be quite irritating to her.  In 08/19, she was in the Emergency Department for such a headache.  She was given a cocktail of Decadron, Reglan and Benadryl, which did not do a whole lot.  She was having some chest pain with the headache.  The headaches are worse with stress.  As noted, she has a headache pretty much every day.  She has never tried Imitrex or preventive agents.  She has used Tylenol, Excedrin and Excedrin Migraine.  These do not help.  She does not drink caffeine.  Excedrin may take the edge off the headache.  She has never been on a preventive as noted.  When she has the photophobia, phonophobia and nausea, she will have to go to bed.  She does get occasional blind spots in her vision with the headache.  These can last up to all day.  She has no problem with hearing, speech or swallowing.  Occasionally, she may get a numbness in her arms but otherwise has no symptoms in her arms or legs.      Her sleep is not good as a rule.  She has a difficult time getting to sleep.  She cannot \"turn her mind off.\"  Her diet could be improved.  She does not really exercise on a regular basis.      PAST MEDICAL HISTORY:  Largely noncontributory.  She " does not have high blood pressure, diabetes, thyroid or asthma.  She was put on a trial of methocarbamol, which did not help.  She is on medroxyprogesterone.  She has not had pertinent surgery or trauma to the head or neck.  She does not drink or smoke.  She is not pregnant.      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      FAMILY HISTORY:  Positive for migraine in her father.      PHYSICAL EXAMINATION:   GENERAL:  The patient is cooperative and in no distress.   VITAL SIGNS:  Her blood pressure is 123/80.   NECK:  There are no carotid bruits.   HEART:  Auscultation of the heart shows S1 and S2.   NEUROLOGIC:  The patient is alert, oriented and lucid.   CRANIAL NERVES:  Show full visual fields to confrontation.  Funduscopic exam shows sharp discs bilaterally.  Visual acuity is 20/20 bilaterally.  Eye movements are complete and conjugate without nystagmus.  Pupils react to light.  Facial sensation is normal.  Face moves symmetrically.  Palate elevates in the midline.  Tongue protrudes in the midline.   MOTOR:  Evaluation shows no pronator drift, normal finger tapping, finger-nose-finger and heel-knee-shin.  The patient has good strength in the arms, hands and legs.  Muscle stretch reflexes are normal-reduced and symmetric.  Toes are downgoing.   SENSORY:  Exam shows preserved vibration and temperature.   GAIT, STATION AND COORDINATION:  Romberg sign is absent.  She can walk on her heels, toes and tandem.      She did see Dr. Jackson years ago for right arm and hand numbness.  She had an MRI scan of the brain performed in 2011 that was unremarkable.      ASSESSMENT:  Mixed headache disorder, including migraine and tension headache.      DISCUSSION:  The patient is seen for evaluation of headache.  Her exam is normal.  The description sounds most like migraine with tension headache.  For treatment, I am going to prescribe her preventive nortriptyline, building up to 30  mg at night.  The side effects were reviewed.  Hopefully, this will prevent the headache and improve her sleep.  I am also giving her a prescription for sumatriptan 50 mg to take on an interventional basis.  Side effects were reviewed.  I will see her in followup in a month to review her status.  I encouraged her to call if she had questions or problems before followup.  If these measures are ineffective, she may need referral to the Headache Clinic.         ANSHUL SANTANA MD             D: 10/30/2019   T: 10/30/2019   MT:       Name:     JOSELUIS PEREIRA   MRN:      [id number]        Account:      [id number]   :      1985           Visit Date:   10/30/2019      Document: [id number]       cc: Lisa Carney MD

## 2019-10-30 NOTE — NURSING NOTE
Cruz Obregon's goals for this visit include:   Chief Complaint   Patient presents with     Consult     headaches       She requests these members of her care team be copied on today's visit information:     PCP: Lisa Mnotana    Referring Provider:  Lisa Carney MD  [id number] [place] [address]    /80 (BP Location: Left arm, Patient Position: Sitting, Cuff Size: Adult Regular)   Pulse 72   Wt 66.5 kg (146 lb 9.6 oz)   BMI 22.96 kg/m      Do you need any medication refills at today's visit? No

## 2019-11-04 ENCOUNTER — TELEPHONE (OUTPATIENT)
Dept: FAMILY MEDICINE | Facility: CLINIC | Age: 34
End: 2019-11-04

## 2019-11-04 NOTE — TELEPHONE ENCOUNTER
Reason for Call:  Other appointment    Detailed comments: PT needs to get in for her Depo shot by 11/11/2019 but there are no available appts or inj appts. Pt would like a call back to see if she can get in before that. Her daughter has an appt on 11/06/2019 at 1:20 so if possible she would like to get it done at the same time. Thank you.    Phone Number Patient can be reached at: Home number on file [phone] (home)    Best Time: Any    Can we leave a detailed message on this number? YES    Call taken on 11/4/2019 at 10:35 AM by Kathy Urbina

## 2019-11-05 NOTE — TELEPHONE ENCOUNTER
Spoke to patient put on schedule 11/6/19 1:20 pm I guess daughter has her appointment at 1:40 pm not 1:20 pm.  Markell MORA

## 2019-11-06 ENCOUNTER — HEALTH MAINTENANCE LETTER (OUTPATIENT)
Age: 34
End: 2019-11-06

## 2019-11-19 DIAGNOSIS — Z30.9 CONTRACEPTIVE MANAGEMENT: Primary | ICD-10-CM

## 2019-11-20 ENCOUNTER — ALLIED HEALTH/NURSE VISIT (OUTPATIENT)
Dept: NURSING | Facility: CLINIC | Age: 34
End: 2019-11-20
Payer: COMMERCIAL

## 2019-11-20 VITALS
BODY MASS INDEX: 22.08 KG/M2 | SYSTOLIC BLOOD PRESSURE: 118 MMHG | WEIGHT: 141 LBS | HEART RATE: 76 BPM | DIASTOLIC BLOOD PRESSURE: 80 MMHG

## 2019-11-20 DIAGNOSIS — Z30.42 ENCOUNTER FOR DEPO-PROVERA CONTRACEPTION: Primary | ICD-10-CM

## 2019-11-20 PROCEDURE — 96372 THER/PROPH/DIAG INJ SC/IM: CPT

## 2019-11-20 PROCEDURE — 99207 ZZC NO CHARGE NURSE ONLY: CPT

## 2019-11-20 RX ADMIN — MEDROXYPROGESTERONE ACETATE 150 MG: 150 INJECTION, SUSPENSION INTRAMUSCULAR at 08:35

## 2019-11-20 NOTE — PROGRESS NOTES
Follow Up Injection    Patient returning during stated date range given at previous visit: No, urine pregnancy test performed, results (neg - injection administered, positive - injection deferred)      If here at the correct interval:   BP Readings from Last 1 Encounters:   10/30/19 123/80     Wt Readings from Last 1 Encounters:   10/30/19 66.5 kg (146 lb 9.6 oz)       /80 pulse 76 weight 141lbs    Last Pap/exam date: 4/24/17      Side effects or problems with last injection?  No.  Date range is given to patient for next dose: NEXT INJECTION DUE: 2/5/20 - 2/19/20    See Medication Note for administration information    Staff Sig: Justa Rodriguez RN, [facility]

## 2019-11-25 DIAGNOSIS — G47.00 INSOMNIA, UNSPECIFIED TYPE: ICD-10-CM

## 2019-11-25 NOTE — TELEPHONE ENCOUNTER
Requested Prescriptions   Pending Prescriptions Disp Refills     zolpidem (AMBIEN) 5 MG tablet [Pharmacy Med Name: ZOLPIDEM 5MG TABLETS]        Last Written Prescription Date:  07/02/19  Last Fill Quantity: 30,   # refills: 3  Last Office Visit: 09/12/19-Sharon Carney  [facility] Office visit:    Next 5 appointments (look out 90 days)    Dec 04, 2019 11:00 AM CST  [facility] [address] [place] [id number]  [phone]   Feb 06, 2020 11:00 AM CST  Nurse Only with BK RN  [facility] ([facility]) [address] [facility] [id number] [phone]           Routing refill request to provider for review/approval because:  Drug not on the [facility], [facility] or [facility] refill protocol or controlled substance 30 tablet 0     Sig: TAKE 1 TABLET(5 MG) BY MOUTH AT BEDTIME       There is no refill protocol information for this order

## 2019-11-25 NOTE — TELEPHONE ENCOUNTER
Routing refill request to provider for review/approval because:  Drug not on the [facility] refill protocol       Gallo Barnett RN, BSN, PHN

## 2019-11-26 RX ORDER — ZOLPIDEM TARTRATE 5 MG/1
TABLET ORAL
Qty: 30 TABLET | Refills: 0 | Status: SHIPPED | OUTPATIENT
Start: 2019-11-26 | End: 2020-01-07

## 2019-12-04 ENCOUNTER — OFFICE VISIT (OUTPATIENT)
Dept: NEUROLOGY | Facility: CLINIC | Age: 34
End: 2019-12-04
Payer: COMMERCIAL

## 2019-12-04 VITALS
RESPIRATION RATE: 18 BRPM | BODY MASS INDEX: 22.29 KG/M2 | HEIGHT: 67 IN | OXYGEN SATURATION: 97 % | DIASTOLIC BLOOD PRESSURE: 68 MMHG | HEART RATE: 62 BPM | WEIGHT: 142 LBS | SYSTOLIC BLOOD PRESSURE: 121 MMHG

## 2019-12-04 DIAGNOSIS — G43.009 MIGRAINE WITHOUT AURA AND WITHOUT STATUS MIGRAINOSUS, NOT INTRACTABLE: Primary | ICD-10-CM

## 2019-12-04 PROCEDURE — 99213 OFFICE O/P EST LOW 20 MIN: CPT | Performed by: PSYCHIATRY & NEUROLOGY

## 2019-12-04 ASSESSMENT — MIFFLIN-ST. JEOR: SCORE: 1386.74

## 2019-12-04 ASSESSMENT — PAIN SCALES - GENERAL: PAINLEVEL: MODERATE PAIN (5)

## 2019-12-04 NOTE — PROGRESS NOTES
Visit Date:   2019      This patient is seen in followup with mixed headache disorder.  I saw her about 5 weeks ago.  She has a migraine and tension headache.  She had tried several over-the-counter remedies without benefit.  I put her on nortriptyline, building up to 30 mg at night, and Sumatriptan 50 mg as needed.  The nortriptyline has not been helpful at all.  She continues to have the daily or every other day headache.  It tends to be a frontal type headache.  She is quite sensitive to light with it and light can be a trigger for the headache.  She is having about the same number of headaches as she was before.  As noted, Sumatriptan will help take the edge off.  Typically, she does not have to repeat the dose.  It does not eliminate the headache.      PHYSICAL EXAMINATION:   GENERAL:  The patient is cooperative and in no distress.   VITAL SIGNS:  Her blood pressure is 121/68.   NEUROLOGIC:  Visual acuity 20/20 bilaterally.  Funduscopic exam shows sharp disc on the right.  I had a difficult time seeing it on the left because the patient is quite light sensitive.      ASSESSMENT:  Mixed headache disorder, including migraine and tension headache.      DISCUSSION:  The patient is seen in followup with the above issue.  I discussed a trial of topiramate or propranolol, but she is quite concerned about possible side effects.  The nortriptyline has been ineffective, so I told her to go ahead and discontinue it.  She can continue to use the sumatriptan as needed, but has to watch her rate of usage.  That has not been an issue so far.  I am going to refer her to the Headache Clinic to see if they have any other suggestions about interventions that might be helpful.  I will see her in followup on an as as-needed basis.         ANSHUL SANTANA MD             D: 2019   T: 2019   MT: KAM      Name:     JOSELUIS PEREIRA   MRN:      [id number]        Account:      [id number]   :      1985            Visit Date:   12/04/2019      Document: [id number]

## 2019-12-04 NOTE — NURSING NOTE
"Cruz Obregon's goals for this visit include: Return  She requests these members of her care team be copied on today's visit information: PCP    PCP: Lisa Montana    Referring Provider:  No referring provider defined for this encounter.    /68   Pulse 62   Resp 18   Ht 1.702 m (5' 7\")   Wt 64.4 kg (142 lb)   SpO2 97%   BMI 22.24 kg/m      Do you need any medication refills at today's visit? N    "

## 2019-12-04 NOTE — LETTER
12/4/2019         RE: Crzu Obregon  [address]  [facility] [address]        Dear Colleague,    Thank you for referring your patient, Cruz Obregon, to the [facility]. Please see a copy of my visit note below.    Visit Date:   12/04/2019      This patient is seen in followup with mixed headache disorder.  I saw her about 5 weeks ago.  She has a migraine and tension headache.  She had tried several over-the-counter remedies without benefit.  I put her on nortriptyline, building up to 30 mg at night, and Sumatriptan 50 mg as needed.  The nortriptyline has not been helpful at all.  She continues to have the daily or every other day headache.  It tends to be a frontal type headache.  She is quite sensitive to light with it and light can be a trigger for the headache.  She is having about the same number of headaches as she was before.  As noted, Sumatriptan will help take the edge off.  Typically, she does not have to repeat the dose.  It does not eliminate the headache.      PHYSICAL EXAMINATION:   GENERAL:  The patient is cooperative and in no distress.   VITAL SIGNS:  Her blood pressure is 121/68.   NEUROLOGIC:  Visual acuity 20/20 bilaterally.  Funduscopic exam shows sharp disc on the right.  I had a difficult time seeing it on the left because the patient is quite light sensitive.      ASSESSMENT:  Mixed headache disorder, including migraine and tension headache.      DISCUSSION:  The patient is seen in followup with the above issue.  I discussed a trial of topiramate or propranolol, but she is quite concerned about possible side effects.  The nortriptyline has been ineffective, so I told her to go ahead and discontinue it.  She can continue to use the sumatriptan as needed, but has to watch her rate of usage.  That has not been an issue so far.  I am going to refer her to the Headache Clinic to see if they have any other suggestions about interventions that might be helpful.  I  will see her in followup on an as as-needed basis.         ANSHUL DUVAL MD             D: 2019   T: 2019   MT: KAM      Name:     JOSELUIS PEREIRA   MRN:      [id number]        Account:      [id number]   :      1985           Visit Date:   2019      Document: [id number]       Again, thank you for allowing me to participate in the care of your patient.        Sincerely,        Anshul Duval MD

## 2020-01-06 DIAGNOSIS — G47.00 INSOMNIA, UNSPECIFIED TYPE: ICD-10-CM

## 2020-01-06 NOTE — TELEPHONE ENCOUNTER
Requested Prescriptions   Pending Prescriptions Disp Refills     zolpidem (AMBIEN) 5 MG tablet [Pharmacy Med Name: ZOLPIDEM 5MG TABLETS]        Last Written Prescription Date:  11/26/19  Last Fill Quantity: 30,   # refills: 0  Last Office Visit: 09/12/19-Sharon Carney  [facility] Office visit:    Next 5 appointments (look out 90 days)    Feb 06, 2020 11:00 AM CST  Nurse Only with BK RN  [facility] [address] [place] [id number] [phone]           Routing refill request to provider for review/approval because:  Drug not on the [facility], [facility] or  Health refill protocol or controlled substance 30 tablet      Sig: TAKE 1 TABLET(5 MG) BY MOUTH AT BEDTIME       There is no refill protocol information for this order

## 2020-01-07 RX ORDER — ZOLPIDEM TARTRATE 5 MG/1
TABLET ORAL
Qty: 30 TABLET | Refills: 1 | Status: SHIPPED | OUTPATIENT
Start: 2020-01-07 | End: 2020-03-23

## 2020-02-06 ENCOUNTER — ALLIED HEALTH/NURSE VISIT (OUTPATIENT)
Dept: NURSING | Facility: CLINIC | Age: 35
End: 2020-02-06
Payer: COMMERCIAL

## 2020-02-06 VITALS — WEIGHT: 133.8 LBS | SYSTOLIC BLOOD PRESSURE: 122 MMHG | DIASTOLIC BLOOD PRESSURE: 79 MMHG | BODY MASS INDEX: 20.96 KG/M2

## 2020-02-06 DIAGNOSIS — Z30.42 ENCOUNTER FOR SURVEILLANCE OF INJECTABLE CONTRACEPTIVE: Primary | ICD-10-CM

## 2020-02-06 PROCEDURE — 99207 ZZC NO CHARGE NURSE ONLY: CPT

## 2020-02-06 PROCEDURE — 96372 THER/PROPH/DIAG INJ SC/IM: CPT

## 2020-02-06 RX ORDER — MEDROXYPROGESTERONE ACETATE 150 MG/ML
150 INJECTION, SUSPENSION INTRAMUSCULAR
Status: DISCONTINUED | OUTPATIENT
Start: 2020-02-06 | End: 2020-07-08

## 2020-02-06 RX ADMIN — MEDROXYPROGESTERONE ACETATE 150 MG: 150 INJECTION, SUSPENSION INTRAMUSCULAR at 11:25

## 2020-02-06 NOTE — PROGRESS NOTES
Clinic Administered Medication Documentation    MEDICATION LIST:   Depo Provera Documentation    Prior to injection, verified patient identity using patient's name and date of birth. Medication was administered. Please see MAR and medication order for additional information. Patient instructed to report any adverse reaction to staff immediately .    BP: 122/79    LAST PAP/EXAM:   Lab Results   Component Value Date    PAP NIL 04/24/2017     URINE HCG:not indicated    NEXT INJECTION DUE: 4/23/20 - 5/7/20    Was entire vial of medication used? Yes  Vial/Syringe: Single dose vial  Expiration Date:  9/2020    Follow Up Injection    Patient returning during stated date range given at previous visit: Yes      If here at the correct interval:   BP Readings from Last 1 Encounters:   12/04/19 121/68     Wt Readings from Last 1 Encounters:   12/04/19 64.4 kg (142 lb)       Side effects or problems with last injection?  No.    Staff Sig: Salma Trejo RN  [facility]

## 2020-03-21 DIAGNOSIS — G47.00 INSOMNIA, UNSPECIFIED TYPE: ICD-10-CM

## 2020-03-22 NOTE — TELEPHONE ENCOUNTER
Requested Prescriptions   Pending Prescriptions Disp Refills     zolpidem (AMBIEN) 5 MG tablet [Pharmacy Med Name: ZOLPIDEM 5MG TABLETS] 30 tablet      Sig: TAKE 1 TABLET(5 MG) BY MOUTH AT BEDTIME       There is no refill protocol information for this order        Last Written Prescription Date:  1/7/20  Last Fill Quantity: 30,  # refills: 1   Last office visit: 9/12/2019 with prescribing provider:     Future Office Visit:   Next 5 appointments (look out 90 days)    Apr 24, 2020  1:20 PM CDT  [facility] with BK [facility]  [facility] [address] [place] [id number] [phone]         Routing refill request to provider for review/approval because:  Drug not on the [facility], [facility] or [facility] refill protocol or controlled substance

## 2020-03-23 RX ORDER — ZOLPIDEM TARTRATE 5 MG/1
5 TABLET ORAL
Qty: 30 TABLET | Refills: 0 | Status: SHIPPED | OUTPATIENT
Start: 2020-03-23 | End: 2020-04-30

## 2020-04-27 ENCOUNTER — ALLIED HEALTH/NURSE VISIT (OUTPATIENT)
Dept: NURSING | Facility: CLINIC | Age: 35
End: 2020-04-27
Payer: COMMERCIAL

## 2020-04-27 VITALS — BODY MASS INDEX: 22.49 KG/M2 | DIASTOLIC BLOOD PRESSURE: 74 MMHG | WEIGHT: 143.6 LBS | SYSTOLIC BLOOD PRESSURE: 128 MMHG

## 2020-04-27 DIAGNOSIS — Z30.42 ENCOUNTER FOR SURVEILLANCE OF INJECTABLE CONTRACEPTIVE: Primary | ICD-10-CM

## 2020-04-27 DIAGNOSIS — G47.00 INSOMNIA, UNSPECIFIED TYPE: ICD-10-CM

## 2020-04-27 PROCEDURE — 99207 ZZC NO CHARGE NURSE ONLY: CPT

## 2020-04-27 PROCEDURE — 96372 THER/PROPH/DIAG INJ SC/IM: CPT

## 2020-04-27 RX ORDER — MEDROXYPROGESTERONE ACETATE 150 MG/ML
150 INJECTION, SUSPENSION INTRAMUSCULAR
Status: DISCONTINUED | OUTPATIENT
Start: 2020-04-27 | End: 2020-07-08

## 2020-04-27 RX ADMIN — MEDROXYPROGESTERONE ACETATE 150 MG: 150 INJECTION, SUSPENSION INTRAMUSCULAR at 13:10

## 2020-04-27 NOTE — PROGRESS NOTES
Clinic Administered Medication Documentation      Depo Provera Documentation    URINE HCG: not indicated    Depo-Provera Standing Order inclusion/exclusion criteria reviewed.   Patient meets: inclusion criteria     LAST PAP/EXAM:   Lab Results   Component Value Date    PAP NIL 04/24/2017       Prior to injection, verified patient identity using patient's name and date of birth. Medication was administered. Please see MAR and medication order for additional information.     Was entire vial of medication used? Yes  Vial/Syringe: Single dose vial  Expiration Date:  10/2021  Injection site: left ventrogluteal     Patient instructed to report any adverse reaction to staff immediately .  NEXT INJECTION DUE: 7/13/20 - 7/27/20      Follow Up Injection    Patient returning during stated date range given at previous visit: Yes      Side effects or problems with last injection?  No.  Date range is given to patient for next dose: yes     See Medication Note for administration information    Staff Sig: Salma Trejo RN  [facility]

## 2020-04-29 RX ORDER — ZOLPIDEM TARTRATE 5 MG/1
TABLET ORAL
Qty: 30 TABLET | OUTPATIENT
Start: 2020-04-29

## 2020-04-29 NOTE — TELEPHONE ENCOUNTER
Routing refill request to provider for review/approval because:  Drug not on the [facility] refill protocol.    Justa Rodriguez RN, [facility] Triage

## 2020-04-30 ENCOUNTER — VIRTUAL VISIT (OUTPATIENT)
Dept: FAMILY MEDICINE | Facility: CLINIC | Age: 35
End: 2020-04-30
Payer: COMMERCIAL

## 2020-04-30 DIAGNOSIS — G47.00 INSOMNIA, UNSPECIFIED TYPE: ICD-10-CM

## 2020-04-30 PROCEDURE — 99213 OFFICE O/P EST LOW 20 MIN: CPT | Mod: 95 | Performed by: FAMILY MEDICINE

## 2020-04-30 RX ORDER — ZOLPIDEM TARTRATE 5 MG/1
5 TABLET ORAL
Qty: 30 TABLET | Refills: 3 | Status: SHIPPED | OUTPATIENT
Start: 2020-04-30 | End: 2020-06-19

## 2020-04-30 ASSESSMENT — PAIN SCALES - GENERAL: PAINLEVEL: NO PAIN (0)

## 2020-04-30 NOTE — PROGRESS NOTES
"Cruz Obregon is a 34 year old female who is being evaluated via a billable telephone visit.      The patient has been notified of following:     \"This telephone visit will be conducted via a call between you and your physician/provider. We have found that certain health care needs can be provided without the need for a physical exam.  This service lets us provide the care you need with a short phone conversation.  If a prescription is necessary we can send it directly to your pharmacy.  If lab work is needed we can place an order for that and you can then stop by our lab to have the test done at a later time.    Telephone visits are billed at different rates depending on your insurance coverage. During this emergency period, for some insurers they may be billed the same as an in-person visit.  Please reach out to your insurance provider with any questions.    If during the course of the call the physician/provider feels a telephone visit is not appropriate, you will not be charged for this service.\"    Patient has given verbal consent for Telephone visit?  Yes    How would you like to obtain your AVS? doesn't need one   Subjective     Cruz Obregon is a 34 year old female who presents to clinic today for the following health issues:    HPI     Insomnia/Narcolepsy -  Doing well with zolpidem. No side effects and getting effective sleep. Sometimes sleep initiation is delayed.        Reviewed and updated as needed this visit by Provider  Tobacco  Allergies  Meds  Problems  Med Hx  Surg Hx  Fam Hx         Review of Systems   ROS COMP: Constitutional, HEENT, cardiovascular, pulmonary, gi and gu systems are negative, except as otherwise noted.       Objective   Reported vitals:  Breastfeeding No    healthy, alert and no distress  PSYCH: Alert and oriented times 3; coherent speech, normal   rate and volume, able to articulate logical thoughts, able   to abstract reason, no tangential thoughts, no " hallucinations   or delusions  Her affect is normal and pleasant  RESP: No cough, no audible wheezing, able to talk in full sentences  Remainder of exam unable to be completed due to telephone visits    Diagnostic Test Results:  Labs reviewed in Epic        Assessment/Plan:  1. Insomnia, unspecified type  Stable - refill and consider adding Melatonin at start to night.  - zolpidem (AMBIEN) 5 MG tablet; Take 1 tablet (5 mg) by mouth nightly as needed for sleep  Dispense: 30 tablet; Refill: 3    The uses and side effects, including black box warnings as appropriate, were discussed in detail.  All patient questions were answered.  The patient was instructed to call immediately if any side effects developed.     Return in about 6 months (around 10/30/2020).      Phone call duration:  5 minutes    Lisa Carney MD

## 2020-04-30 NOTE — PATIENT INSTRUCTIONS
Patient Education     Treating Insomnia     Learning to relax before bedtime can improve your sleep.   Good sleeping habits are a key part of treatment. If needed, some medicines may help you sleep better at first. Making healthy lifestyle changes and learning to relax can improve your sleep. Treating insomnia takes commitment. But trust that your efforts will pay off. Be sure to talk with your healthcare provider before taking any medicine.  Healthy lifestyle  Your lifestyle affects your health and your sleep. Here are some healthy habits:    Keep a regular sleep schedule. Go to bed and get up at the same time each day.    Exercise regularly. It may help you reduce stress. Avoid strenuous exercise for 2 to 4 hours before bedtime.    Avoid or limit naps, especially in the late afternoon.    Use your bed only for sleep and sex.    Don t spend too much time in bed trying to fall asleep. If you can t fall asleep, get up and do something (no electronics) until you become tired and drowsy.    Avoid or limit caffeine and nicotine for up to 6 hours before bedtime. They can keep you awake at night.     Also avoid alcohol for at least 4 to 6 hours before bedtime. It may help you fall asleep at first. But you will have more awakenings during the night. And your sleep will not be restful.  Before bedtime  To sleep better every night, try these tips:    Have a bedtime routine to let your body and mind know when it s time to sleep.    Think of going to bed as relaxing and enjoyable. Sleep will come sooner.    If your worries don t let you sleep, write them down in a diary. Then close it, and go to bed.    Make sure the room is not too hot or too cold. If it s not dark enough, an eye mask can help. If it s noisy, try using earplugs.    Don't eat a large meal just before bedtime.    Remove noises, bright lights, TVs, cell phones, and computers from your sleeping environment.    Use a comfortable mattress and pillow.  Learn to  relax  Stress, anxiety, and body tension may keep you awake at night. To unwind before bedtime, try a warm bath, meditation, or yoga. Also try the following:    Deep breathing. Sit or lie back in a chair. Take a slow, deep breath. Hold it for 5 counts. Then breathe out slowly through your mouth. Keep doing this until you feel relaxed.    Progressive muscle relaxation. Tense and then relax the muscles in your body as you breathe deeply. Start with your feet and work up your body to your neck and face.  Cognitive Behavioral Therapy (CBT)  CBT is the most effective treatment for long-term insomnia. It tries to address the underlying causes of your sleep problems, including your habits and how you think about sleep.  Individual Therapy  Heri Michael PsyD, JUSTIN  Insomnia   [facility]: [phone]    [facility]: [phone]  [facility]  Visit [email or web address] or call [phone] to find a clinic close to you.  Suggested resources  The resources below may help you relax. This list is for information only. [facility] is not responsible for the quality of services or the actions of any person or organization. There may be a fee to use some of these resources.  Insomnia treatment books  Nettie Mind by Millicent Antonio and Rosa Durham (2013)  Overcoming Insomnia by Maximino Orozco and Millicent Antonio (2008)  Quiet Your Mind and Get to Sleep: Solutions to Insomnia for Those with Depression, Anxiety or Chronic Pain by Millicent Antonio and Rosa Durham (2009)  No More Sleepless Nights by Umer Perez and Lili Hutson (1996)  Say Nettie to Insomnia by Maxx Mora (2009)  The Insomnia Workbook by Perla Angeles and Alo Aggarwal (2009)  The Insomnia Answer by Aric Conte and Maximino Garcia (2006)  Stress management and relaxation books  The Relaxation and Stress Reduction Workbook by Dolly Garcia,  Edel Ventura and Constantine Shields (2008)  Stress Management Workbook: Techniques and Self-Assessment Procedures by Larissa Forbes and Joseph Logan (1997)  A Mindfulness-Based Stress Reduction Workbook by Luciano Amaral and Hortencia Cole (2010)  The Complete Stress Management Workbook by Guido Anderson, Godwin Duncan and Eduardo Keita (1996)  Online programs  www.SHUTi.me (pronounced shut eye). There is a fee for this program.   www.sleepIO.com (pronounced sleep ee oh). There is a fee for this program.  Other online resources  Deep breathing and progressive muscle relaxation (PMR):    http://www.Dine perfect  Meditation:    www.[employer]    www.MocoplexguidedBoxeemeditationBoxeesite.com  Guided imagery:    http://www.Dine perfect    http://[facility].[employer]  (click on  Resource Library,  then choose  Meditation, Relaxation, Guided Imagery )  Apps for your mobile device:    Autogenic Training Progressive Muscle Relaxation by "[employer] Santosh    Calm: Meditation and Sleep Stories by Calm.com[employer] Inc.    [employer] Timer - Meditation Tegan by [employer].  This is not a prescription and these resources are optional. You must pay for any costs when using these resources. Please ask your insurance carrier if you can be reimbursed for these resources. If so, you are responsible for sending the needed details to your insurance carrier. These resources may also be tax deductible as medical expenses. Check with your .  Date Last Reviewed: 5/18/2018  Modifications clinically reviewed by Heri Michael PsyD, JUSTIN, [facility], Director of the Insomnia and Behavioral Sleep Health Program, [facility].    6494-9765 [employer]. [address] [place] [address], [place] [address]. All rights reserved. This information is not intended as a substitute for professional medical care. Always follow your healthcare professional's instructions. This information has been modified by  your health care provider with permission from the publisher.

## 2020-04-30 NOTE — PROGRESS NOTES
Mailing AVS to patient's address listed in the chart. This will go out in tomorrow's mail.  Shamika [facility]  2nd Floor  Primary Care

## 2020-05-05 ENCOUNTER — TELEPHONE (OUTPATIENT)
Dept: FAMILY MEDICINE | Facility: CLINIC | Age: 35
End: 2020-05-05

## 2020-05-05 NOTE — TELEPHONE ENCOUNTER
Reason for Call:  Other prescription    Detailed comments: States she has a bacterial infection. Asking if you can send a prescription to her pharmacy. Please call and let her know if and when something is sent over. Thank you     Phone Number Patient can be reached at: Home number on file [phone] (home)    Best Time: Any    Can we leave a detailed message on this number? YES    Call taken on 5/5/2020 at 9:37 AM by Dipesh Joe

## 2020-05-05 NOTE — TELEPHONE ENCOUNTER
Called and spoke to the patient and patient prefers doing a Telephone visit. Scheduled for 5/6/2020 with Dr Sharon Carney.   Shamika Dalton [facility]  2nd Floor  Primary Care

## 2020-05-06 ENCOUNTER — VIRTUAL VISIT (OUTPATIENT)
Dept: FAMILY MEDICINE | Facility: CLINIC | Age: 35
End: 2020-05-06
Payer: COMMERCIAL

## 2020-05-06 VITALS — WEIGHT: 146.4 LBS | BODY MASS INDEX: 22.93 KG/M2

## 2020-05-06 DIAGNOSIS — N76.0 BACTERIAL VAGINOSIS: Primary | ICD-10-CM

## 2020-05-06 DIAGNOSIS — B96.89 BACTERIAL VAGINOSIS: Primary | ICD-10-CM

## 2020-05-06 PROCEDURE — 99213 OFFICE O/P EST LOW 20 MIN: CPT | Mod: 95 | Performed by: FAMILY MEDICINE

## 2020-05-06 RX ORDER — METRONIDAZOLE 7.5 MG/G
1 GEL VAGINAL DAILY
Qty: 70 G | Refills: 0 | Status: SHIPPED | OUTPATIENT
Start: 2020-05-06 | End: 2020-07-08

## 2020-05-06 ASSESSMENT — PAIN SCALES - GENERAL: PAINLEVEL: NO PAIN (0)

## 2020-05-06 NOTE — PATIENT INSTRUCTIONS

## 2020-05-06 NOTE — PROGRESS NOTES
"Cruz Obregon is a 34 year old female who is being evaluated via a billable telephone visit.      The patient has been notified of following:     \"This telephone visit will be conducted via a call between you and your physician/provider. We have found that certain health care needs can be provided without the need for a physical exam.  This service lets us provide the care you need with a short phone conversation.  If a prescription is necessary we can send it directly to your pharmacy.  If lab work is needed we can place an order for that and you can then stop by our lab to have the test done at a later time.    Telephone visits are billed at different rates depending on your insurance coverage. During this emergency period, for some insurers they may be billed the same as an in-person visit.  Please reach out to your insurance provider with any questions.    If during the course of the call the physician/provider feels a telephone visit is not appropriate, you will not be charged for this service.\"    Patient has given verbal consent for Telephone visit?  Yes    What phone number would you like to be contacted at? [phone]    How would you like to obtain your AVS? Declined     Subjective     Cruz Obregon is a 34 year old female who presents to clinic today for the following health issues:    HPI     Vaginal odor and discharge for 1 day.  No menses or vaginal bleeding due to depo. No change in sex partners.  No probiotic or yogurt intake.          Reviewed and updated as needed this visit by Provider  Tobacco  Allergies  Meds  Problems  Med Hx  Surg Hx  Fam Hx         Review of Systems   ROS COMP: Constitutional, HEENT, cardiovascular, pulmonary, gi and gu systems are negative, except as otherwise noted.       Objective   Reported vitals:  Wt 66.4 kg (146 lb 6.4 oz)   Breastfeeding No   BMI 22.93 kg/m     healthy, alert and no distress  PSYCH: Alert and oriented times 3; coherent speech, normal "   rate and volume, able to articulate logical thoughts, able   to abstract reason, no tangential thoughts, no hallucinations   or delusions  Her affect is normal  RESP: No cough, no audible wheezing, able to talk in full sentences  Remainder of exam unable to be completed due to telephone visits    Diagnostic Test Results:  Labs reviewed in Epic        Assessment/Plan:  1. Bacterial vaginosis  Vaginal treatment and consider probiotics.  - metroNIDAZOLE (METROGEL) 0.75 % vaginal gel; Place 1 applicator (5 g) vaginally daily for 7 days  Dispense: 70 g; Refill: 0  The uses and side effects, including black box warnings as appropriate, were discussed in detail.  All patient questions were answered.  The patient was instructed to call immediately if any side effects developed.    Return in about 3 days (around 5/9/2020), or if symptoms worsen or fail to improve.      Phone call duration:  5 minutes    Lisa Carney MD

## 2020-06-11 DIAGNOSIS — G47.00 INSOMNIA, UNSPECIFIED TYPE: ICD-10-CM

## 2020-06-11 RX ORDER — ZOLPIDEM TARTRATE 5 MG/1
5 TABLET ORAL
Qty: 30 TABLET | Refills: 1 | OUTPATIENT
Start: 2020-06-11

## 2020-06-11 NOTE — TELEPHONE ENCOUNTER
Reason for Call:  Medication or medication refill:    Do you use a [facility]?  Name of the pharmacy and phone number for the current request:  [facility] [address], [place] [address] [phone]    Name of the medication requested: zolpidem (AMBIEN) 5 MG tablet      Other request: patients not able to  at other pharmacy due to closure     Can we leave a detailed message on this number? YES    Phone number patient can be reached at: Cell number on file:    Telephone Information:   Mobile [phone]       Best Time: Any    Call taken on 6/11/2020 at 10:42 AM by George Royal

## 2020-06-19 RX ORDER — ZOLPIDEM TARTRATE 5 MG/1
5 TABLET ORAL
Qty: 30 TABLET | Refills: 3 | Status: SHIPPED | OUTPATIENT
Start: 2020-06-19 | End: 2020-10-09

## 2020-06-19 NOTE — TELEPHONE ENCOUNTER
Different pharmacy being requested for refill than previously sent to due to closure of previous pharmacy.   Requested Prescriptions   Pending Prescriptions Disp Refills     zolpidem (AMBIEN) 5 MG tablet 30 tablet 3     Sig: Take 1 tablet (5 mg) by mouth nightly as needed for sleep       There is no refill protocol information for this order          Routing refill request to provider for review/approval because:  Drug not on the [facility] refill protocol           Gallo Barnett RN, BSN, PHN

## 2020-07-08 ENCOUNTER — VIRTUAL VISIT (OUTPATIENT)
Dept: FAMILY MEDICINE | Facility: CLINIC | Age: 35
End: 2020-07-08
Payer: COMMERCIAL

## 2020-07-08 DIAGNOSIS — R14.1 FLATULENCE, ERUCTATION AND GAS PAIN: ICD-10-CM

## 2020-07-08 DIAGNOSIS — N76.0 BACTERIAL VAGINOSIS: ICD-10-CM

## 2020-07-08 DIAGNOSIS — R14.2 FLATULENCE, ERUCTATION AND GAS PAIN: ICD-10-CM

## 2020-07-08 DIAGNOSIS — N89.8 VAGINAL DISCHARGE: Primary | ICD-10-CM

## 2020-07-08 DIAGNOSIS — B96.89 BACTERIAL VAGINOSIS: ICD-10-CM

## 2020-07-08 DIAGNOSIS — R14.3 FLATULENCE, ERUCTATION AND GAS PAIN: ICD-10-CM

## 2020-07-08 DIAGNOSIS — Z11.3 SCREEN FOR STD (SEXUALLY TRANSMITTED DISEASE): ICD-10-CM

## 2020-07-08 PROCEDURE — 99214 OFFICE O/P EST MOD 30 MIN: CPT | Mod: 95 | Performed by: FAMILY MEDICINE

## 2020-07-08 RX ORDER — METRONIDAZOLE 7.5 MG/G
1 GEL VAGINAL DAILY
Qty: 70 G | Refills: 0 | Status: SHIPPED | OUTPATIENT
Start: 2020-07-08 | End: 2020-12-06

## 2020-07-08 NOTE — PROGRESS NOTES
"Cruz Obregon is a 34 year old female who is being evaluated via a billable video visit.      The patient has been notified of following:     \"This video visit will be conducted via a call between you and your physician/provider. We have found that certain health care needs can be provided without the need for an in-person physical exam.  This service lets us provide the care you need with a video conversation.  If a prescription is necessary we can send it directly to your pharmacy.  If lab work is needed we can place an order for that and you can then stop by our lab to have the test done at a later time.    Video visits are billed at different rates depending on your insurance coverage.  Please reach out to your insurance provider with any questions.    If during the course of the call the physician/provider feels a video visit is not appropriate, you will not be charged for this service.\"    Patient has given verbal consent for Video visit? Yes  How would you like to obtain your AVS? declined  Patient would like the video invitation sent by: Text to cell phone: [phone]  Will anyone else be joining your video visit? No      Subjective     Cruz Obregon is a 34 year old female who presents today via video visit for the following health issues:    HPI  Vaginal Symptoms  Onset: a few days    Description:  Vaginal Discharge: white creamy   Itching (Pruritis): no   Burning sensation:  no   Odor: YES    Accompanying Signs & Symptoms:  Pain with Urination: no   Abdominal Pain: YES  Fever: no     History:   Sexually active: YES  New Partner: no   Possibility of Pregnancy:  No    Precipitating factors:   Recent Antibiotic Use: no     Alleviating factors:  nothing    Therapies Tried and outcome: nothing       Video Start Time: 8:53 AM    Gas for last 6 years. Occurs daily. Does not eat same foods daily.  No other issues/pain    Reviewed and updated as needed this visit by Provider  Tobacco  Allergies  Meds  " Problems  Med Hx  Surg Hx  Fam Hx         Review of Systems   Constitutional, HEENT, cardiovascular, pulmonary, gi and gu systems are negative, except as otherwise noted.      Objective             Physical Exam     GENERAL: Healthy, alert and no distress  EYES: Eyes grossly normal to inspection.  No discharge or erythema, or obvious scleral/conjunctival abnormalities.  RESP: No audible wheeze, cough, or visible cyanosis.  No visible retractions or increased work of breathing.    SKIN: Visible skin clear. No significant rash, abnormal pigmentation or lesions.  NEURO: Cranial nerves grossly intact.  Mentation and speech appropriate for age.  PSYCH: Mentation appears normal, affect normal/bright, judgement and insight intact, normal speech and appearance well-groomed.      Diagnostic Test Results:  Labs reviewed in Epic        Assessment & Plan     1. Vaginal discharge  Labs for evaluation  - Chlamydia trachomatis PCR; Future  - Neisseria gonorrhoeae PCR; Future  - Wet prep; Future  - metroNIDAZOLE (METROGEL) 0.75 % vaginal gel; Place 1 applicator (5 g) vaginally daily  Dispense: 70 g; Refill: 0    2. Bacterial vaginosis  Previous similar symptoms so treat.  - metroNIDAZOLE (METROGEL) 0.75 % vaginal gel; Place 1 applicator (5 g) vaginally daily  Dispense: 70 g; Refill: 0    3. Flatulence, eructation and gas pain  Discussed foods to avoid and etiology of gas.    4. Screen for STD (sexually transmitted disease)  Screening since new partner since Feb.  - Chlamydia trachomatis PCR; Future  - Neisseria gonorrhoeae PCR; Future  - Hepatitis C antibody; Future  - Hepatitis B surface antigen; Future  - HIV Antigen Antibody Combo; Future  - Treponema Abs w Reflex to RPR and Titer; Future     The uses and side effects, including black box warnings as appropriate, were discussed in detail.  All patient questions were answered.  The patient was instructed to call immediately if any side effects developed.     Return in about 1  day (around 7/9/2020) for Lab Work.    Lisa Carney MD  [facility]      Video-Visit Details    Type of service:  Video Visit    Video End Time:9:12 AM    Originating Location (pt. Location): Home    Distant Location (provider location):  [facility]     Platform used for Video Visit: Doximity    Return in about 1 day (around 7/9/2020) for Lab Work.       Lisa Carney MD

## 2020-07-08 NOTE — PATIENT INSTRUCTIONS
? Beans and lentils  ? Asparagus, broccoli, [place] sprouts, cabbage, and other vegetables  ? Fructose, a natural sugar found in artichokes, onions, pears, wheat, and some soft drinks  ? Lactose, the natural sugar found in milk  ? Fruits, oat bran, peas, and other foods high in soluble fiber, which gets digested in your large intestine  ? Corn, pasta, potatoes, and other foods rich in starch  ? Sorbitol, the artificial sweetener  ? Whole grains, such as brown rice, oatmeal, and whole wheat

## 2020-07-09 DIAGNOSIS — Z11.3 SCREEN FOR STD (SEXUALLY TRANSMITTED DISEASE): ICD-10-CM

## 2020-07-09 DIAGNOSIS — N89.8 VAGINAL DISCHARGE: ICD-10-CM

## 2020-07-09 LAB
SPECIMEN SOURCE: ABNORMAL
WET PREP SPEC: ABNORMAL

## 2020-07-09 PROCEDURE — 87210 SMEAR WET MOUNT SALINE/INK: CPT | Performed by: FAMILY MEDICINE

## 2020-07-09 PROCEDURE — 86780 TREPONEMA PALLIDUM: CPT | Performed by: FAMILY MEDICINE

## 2020-07-09 PROCEDURE — 87491 CHLMYD TRACH DNA AMP PROBE: CPT | Performed by: FAMILY MEDICINE

## 2020-07-09 PROCEDURE — 87591 N.GONORRHOEAE DNA AMP PROB: CPT | Performed by: FAMILY MEDICINE

## 2020-07-09 PROCEDURE — 86803 HEPATITIS C AB TEST: CPT | Performed by: FAMILY MEDICINE

## 2020-07-09 PROCEDURE — 87340 HEPATITIS B SURFACE AG IA: CPT | Performed by: FAMILY MEDICINE

## 2020-07-09 PROCEDURE — 36415 COLL VENOUS BLD VENIPUNCTURE: CPT | Performed by: FAMILY MEDICINE

## 2020-07-09 PROCEDURE — 87389 HIV-1 AG W/HIV-1&-2 AB AG IA: CPT | Performed by: FAMILY MEDICINE

## 2020-07-10 LAB
C TRACH DNA SPEC QL NAA+PROBE: NEGATIVE
HBV SURFACE AG SERPL QL IA: NONREACTIVE
HCV AB SERPL QL IA: NONREACTIVE
HIV 1+2 AB+HIV1 P24 AG SERPL QL IA: NONREACTIVE
N GONORRHOEA DNA SPEC QL NAA+PROBE: NEGATIVE
SPECIMEN SOURCE: NORMAL
SPECIMEN SOURCE: NORMAL
T PALLIDUM AB SER QL: NONREACTIVE

## 2020-07-10 NOTE — RESULT ENCOUNTER NOTE
MsDagoberto Obregon,    Your labs show bacterial vaginosis.  The metronidazole given will treat this.  Neither hepatitis B, hepatitis C, HIV, syphilis, gonorrhea nor chlamydia were found.     Please contact the clinic if you have additional questions.  Thank you.    Sincerely,    Lisa Carney MD

## 2020-07-14 ENCOUNTER — TELEPHONE (OUTPATIENT)
Dept: FAMILY MEDICINE | Facility: CLINIC | Age: 35
End: 2020-07-14

## 2020-07-14 ENCOUNTER — ALLIED HEALTH/NURSE VISIT (OUTPATIENT)
Dept: NURSING | Facility: CLINIC | Age: 35
End: 2020-07-14
Payer: COMMERCIAL

## 2020-07-14 VITALS — BODY MASS INDEX: 22.58 KG/M2 | WEIGHT: 144.2 LBS | DIASTOLIC BLOOD PRESSURE: 78 MMHG | SYSTOLIC BLOOD PRESSURE: 115 MMHG

## 2020-07-14 DIAGNOSIS — Z30.42 ENCOUNTER FOR SURVEILLANCE OF INJECTABLE CONTRACEPTIVE: Primary | ICD-10-CM

## 2020-07-14 DIAGNOSIS — Z30.42 ENCOUNTER FOR DEPO-PROVERA CONTRACEPTION: Primary | ICD-10-CM

## 2020-07-14 PROCEDURE — 96372 THER/PROPH/DIAG INJ SC/IM: CPT

## 2020-07-14 PROCEDURE — 99207 ZZC NO CHARGE NURSE ONLY: CPT

## 2020-07-14 RX ORDER — MEDROXYPROGESTERONE ACETATE 150 MG/ML
150 INJECTION, SUSPENSION INTRAMUSCULAR
Status: ACTIVE | OUTPATIENT
Start: 2020-07-14 | End: 2021-04-10

## 2020-07-14 RX ADMIN — MEDROXYPROGESTERONE ACETATE 150 MG: 150 INJECTION, SUSPENSION INTRAMUSCULAR at 13:20

## 2020-07-14 NOTE — PATIENT INSTRUCTIONS
Depo Provera     LAST PAP/EXAM:   Lab Results   Component Value Date    PAP NIL 04/24/2017         NEXT INJECTION DUE: 9/29/20 - 10/13/20

## 2020-07-14 NOTE — TELEPHONE ENCOUNTER
Patient is scheduled for a depo injection today. There are no current orders. Appears patient is overdue for annual exam.    Please place depo orders if appropriate today.  PCP is out of clinic today, will route to partners.    Cayla Martinez RN

## 2020-07-14 NOTE — PROGRESS NOTES
Depo Provera Injection      Patient returning during stated date range given at previous visit: Yes      If here at the correct interval:   BP Readings from Last 1 Encounters:   07/14/20 115/78     Wt Readings from Last 1 Encounters:   07/14/20 65.4 kg (144 lb 3.2 oz)       Side effects or problems with last injection?  No.  Date range is given to patient for next dose: yes    URINE HCG: not indicated    Depo-Provera Standing Order inclusion/exclusion criteria reviewed.   Patient meets: inclusion criteria     BP: 115/78    LAST PAP/EXAM:   Lab Results   Component Value Date    PAP NIL 04/24/2017       Prior to injection, verified patient identity using patient's name and date of birth. Medication was administered. Please see MAR and medication order for additional information.     Was entire vial of medication used? Yes  Vial/Syringe: Single dose vial  Expiration Date:  Jan 2021  Given: Right Gluteus Luiz    Patient instructed to report any adverse reaction to staff immediately  and stay in clinic after the injection but patient declined.      NEXT INJECTION DUE: 9/29/20 - 10/13/20        Staff Sig: Leydi Mixon RN  [facility]/ [facility]

## 2020-08-18 ENCOUNTER — OFFICE VISIT (OUTPATIENT)
Dept: OPTOMETRY | Facility: CLINIC | Age: 35
End: 2020-08-18
Payer: COMMERCIAL

## 2020-08-18 DIAGNOSIS — H52.03 LATENT HYPEROPIA OF BOTH EYES: ICD-10-CM

## 2020-08-18 DIAGNOSIS — H53.8 BLURRED VISION: Primary | ICD-10-CM

## 2020-08-18 PROCEDURE — 92015 DETERMINE REFRACTIVE STATE: CPT | Performed by: OPTOMETRIST

## 2020-08-18 PROCEDURE — 99213 OFFICE O/P EST LOW 20 MIN: CPT | Performed by: OPTOMETRIST

## 2020-08-18 ASSESSMENT — VISUAL ACUITY
OS_CC: 20/25
CORRECTION_TYPE: GLASSES
OS_CC: 20/30
OD_CC: 20/30
OD_CC: 20/25
METHOD: SNELLEN - LINEAR
METHOD_MR_RETINOSCOPY: 1

## 2020-08-18 ASSESSMENT — REFRACTION_WEARINGRX
SPECS_TYPE: BIFOCAL
OS_AXIS: 011
OS_ADD: +1.00
OS_CYLINDER: +0.25
OS_SPHERE: +1.50
OD_CYLINDER: SPHERE
OD_ADD: +1.00
OD_SPHERE: +1.50
OS_CYLINDER: SPHERE
OS_ADD: +0.75
OD_ADD: +0.75
SPECS_TYPE: EXAM
OD_SPHERE: +1.25
OS_SPHERE: +1.50

## 2020-08-18 ASSESSMENT — REFRACTION_MANIFEST
OS_SPHERE: +2.25
OD_ADD: +1.25
OS_ADD: +1.25
OS_SPHERE: +1.50
OD_SPHERE: +2.25
OD_SPHERE: +1.50

## 2020-08-18 ASSESSMENT — CUP TO DISC RATIO
OS_RATIO: 0.35
OD_RATIO: 0.35

## 2020-08-18 ASSESSMENT — EXTERNAL EXAM - LEFT EYE: OS_EXAM: NORMAL

## 2020-08-18 ASSESSMENT — SLIT LAMP EXAM - LIDS
COMMENTS: NORMAL
COMMENTS: NORMAL

## 2020-08-18 ASSESSMENT — EXTERNAL EXAM - RIGHT EYE: OD_EXAM: NORMAL

## 2020-08-18 NOTE — LETTER
8/18/2020         RE: Cruz Obregon  [address]  [facility] [address]        Dear Colleague,    Thank you for referring your patient, Cruz Obrgeon, to the [facility]. Please see a copy of my visit note below.    Chief Complaint   Patient presents with     Blurred Vision Evaluation     Blurred Vision Evaluation   HPI    Blurred Vision Evaluation      Laterality:  both eyes     Onset:  gradual     Onset:  5 months ago     Quality:  blurred vision     Severity:  moderate     Frequency:  constantly     Context:  near vision     Course:  gradually worsening   Comments      Patient has bifocals sometimes they work sometimes they dont     Last exam was 9/19.    Medical, surgical and family histories reviewed and updated 8/18/2020.       OBJECTIVE: See Ophthalmology exam    ASSESSMENT:    ICD-10-CM    1. Blurred vision  H53.8 REFRACTION   2. Latent hyperopia of both eyes  H52.03       PLAN:     Patient Instructions   Recommend new glasses.    Return for comprehensive eye exam 9/2020.    Jarret Matson, JACE         Again, thank you for allowing me to participate in the care of your patient.        Sincerely,        Jarret Matson, OD

## 2020-08-18 NOTE — PROGRESS NOTES
Chief Complaint   Patient presents with     Blurred Vision Evaluation     Blurred Vision Evaluation   HPI    Blurred Vision Evaluation      Laterality:  both eyes     Onset:  gradual     Onset:  5 months ago     Quality:  blurred vision     Severity:  moderate     Frequency:  constantly     Context:  near vision     Course:  gradually worsening   Comments      Patient has bifocals sometimes they work sometimes they dont     Last exam was 9/19.    Medical, surgical and family histories reviewed and updated 8/18/2020.       OBJECTIVE: See Ophthalmology exam    ASSESSMENT:    ICD-10-CM    1. Blurred vision  H53.8 REFRACTION   2. Latent hyperopia of both eyes  H52.03       PLAN:     Patient Instructions   Recommend new glasses.    Return for comprehensive eye exam 9/2020.    Jarret Matson, OD

## 2020-08-18 NOTE — PROGRESS NOTES
"Chief Complaint   Patient presents with     Annual Eye Exam      Accompanied by self  Last Eye Exam: ***  Dilated Previously: {YES / NO:346492::\"Yes\"}    What are you currently using to see?  {options:386658}       Distance Vision Acuity: {VISION:525915}    Near Vision Acuity: {VISION:982915}    Eye Comfort: {EYE COMFORT:941664}  Do you use eye drops? : {YES (EXPLAIN)/NO:636397}  Occupation or Hobbies: ***    [employer]          Medical, surgical and family histories reviewed and updated 8/18/2020.       OBJECTIVE: See Ophthalmology exam    ASSESSMENT:  No diagnosis found.   PLAN:     There are no Patient Instructions on file for this visit.   "

## 2020-08-27 ENCOUNTER — APPOINTMENT (OUTPATIENT)
Dept: OPTOMETRY | Facility: CLINIC | Age: 35
End: 2020-08-27
Payer: COMMERCIAL

## 2020-08-27 PROCEDURE — 92341 FIT SPECTACLES BIFOCAL: CPT | Performed by: OPTOMETRIST

## 2020-09-29 ENCOUNTER — ALLIED HEALTH/NURSE VISIT (OUTPATIENT)
Dept: NURSING | Facility: CLINIC | Age: 35
End: 2020-09-29
Payer: COMMERCIAL

## 2020-09-29 VITALS — DIASTOLIC BLOOD PRESSURE: 69 MMHG | WEIGHT: 141.8 LBS | SYSTOLIC BLOOD PRESSURE: 111 MMHG | BODY MASS INDEX: 22.21 KG/M2

## 2020-09-29 DIAGNOSIS — Z30.42 ENCOUNTER FOR DEPO-PROVERA CONTRACEPTION: Primary | ICD-10-CM

## 2020-09-29 PROCEDURE — 99207 ZZC NO CHARGE NURSE ONLY: CPT

## 2020-09-29 PROCEDURE — 96372 THER/PROPH/DIAG INJ SC/IM: CPT

## 2020-09-29 NOTE — PROGRESS NOTES
BP: Data Unavailable    LAST PAP/EXAM:   Lab Results   Component Value Date    PAP NIL 04/24/2017     URINE HCG:not indicated    The following medication was given:     MEDICATION: Depo Provera 150mg  ROUTE: IM  SITE: Ventrogluteal - Right  NEXT INJECTION DUE: 12/15/20 - 12/29/20 writer scheduled her next depo within window on December 15 th 2020    Follow Up Injection    Patient returning during stated date range given at previous visit: Yes      If here at the correct interval:   BP Readings from Last 1 Encounters:   07/14/20 115/78     Wt Readings from Last 1 Encounters:   07/14/20 65.4 kg (144 lb 3.2 oz)     Cayla Martinez RN

## 2020-10-05 ENCOUNTER — TELEPHONE (OUTPATIENT)
Dept: CARDIOLOGY | Facility: CLINIC | Age: 35
End: 2020-10-05

## 2020-10-05 NOTE — TELEPHONE ENCOUNTER
[facility] Call Center    Phone Message    May a detailed message be left on voicemail: yes     Reason for Call: Order(s): Other:   Reason for requested: Patient needs her annual Echo  Date needed: Today  Provider name: Dr. Alcazar  The patient was scheduled with Dr. Alcazar on 10/08/2020 but when she tried to schedule the Echo there were no appointments available before 10/10/2020.  The patient is requesting a call once the new order is placed so she can schedule both appointments.        Action Taken: Message routed to:  Adult Clinics: Cardiology p [phone]    Travel Screening: Not Applicable

## 2020-10-05 NOTE — TELEPHONE ENCOUNTER
Pt has been scheduled for echo. And follow up with Dr. Inge Delacruz, [facility]......October 5, 2020     2:41 PM

## 2020-10-06 DIAGNOSIS — G47.00 INSOMNIA, UNSPECIFIED TYPE: ICD-10-CM

## 2020-10-09 RX ORDER — ZOLPIDEM TARTRATE 5 MG/1
TABLET ORAL
Qty: 30 TABLET | Refills: 0 | Status: SHIPPED | OUTPATIENT
Start: 2020-10-09 | End: 2020-11-03

## 2020-10-13 ENCOUNTER — ANCILLARY PROCEDURE (OUTPATIENT)
Dept: CARDIOLOGY | Facility: CLINIC | Age: 35
End: 2020-10-13
Attending: INTERNAL MEDICINE
Payer: COMMERCIAL

## 2020-10-13 DIAGNOSIS — I34.0 MITRAL VALVE INSUFFICIENCY, UNSPECIFIED ETIOLOGY: ICD-10-CM

## 2020-10-13 PROCEDURE — 93306 TTE W/DOPPLER COMPLETE: CPT | Performed by: INTERNAL MEDICINE

## 2020-10-20 ENCOUNTER — VIRTUAL VISIT (OUTPATIENT)
Dept: CARDIOLOGY | Facility: CLINIC | Age: 35
End: 2020-10-20
Payer: COMMERCIAL

## 2020-10-20 DIAGNOSIS — I34.1 MITRAL PROLAPSE: ICD-10-CM

## 2020-10-20 DIAGNOSIS — R07.9 CHEST PAIN, UNSPECIFIED TYPE: Primary | ICD-10-CM

## 2020-10-20 PROCEDURE — 99213 OFFICE O/P EST LOW 20 MIN: CPT | Mod: 95 | Performed by: INTERNAL MEDICINE

## 2020-10-20 NOTE — PROGRESS NOTES
2020      Lisa Carney MD   [address] [address]      PATIENT:  Cruz Obregon   MRN:  [id number]   :  1985      Dear Dr. Sharon Carney:      It was a pleasure participating in the care of your patient, Ms. Cruz Obregon.  As you know, she is a 35-year-old lady who I saw today over virtual video visit via [facility] for mitral valve prolapse and mitral insufficiency.      Her past medical history is significant for the followin.  Sleep disorder, possible narcolepsy.   2.  HPV.   3.  Back pain.   4.  Positive CAMILO.   5.  Bacterial vaginosis.   6.  Gastroesophageal reflux disease.      I last saw her back on 10/10/2019 and at that time she was doing adequately.  She presents today for continuing care.      Since our last visit, she has continued to do well.  She leads an active lifestyle, working and doing her activities of daily living.  She has dropped off on doing her exercise bike and treadmill due to COVID, but during her daily activities, she is not limited in any way and she is not getting any symptoms of chest discomfort, shortness of breath, or other symptomatology.  She similarly denies any significant PND, orthopnea, edema, palpitations, syncope, or near-syncope.      She had chronic musculoskeletal chest discomfort since  that got worse when she took a deep breath or puffed her chest out, but it really has not been aggravated lately.  She feels good.      REVIEW OF SYSTEMS:  A 10-point review of systems is otherwise unremarkable.      CURRENT MEDICATIONS:      1.  Ambien.   2.  Depo-Provera.      PHYSICAL EXAMINATION:       VITAL SIGNS:  Her last recorded blood pressure was on 2020 and was 111/69.  Her weight was 141 pounds.   GENERAL:  She appears comfortable, well-groomed, masked.   PSYCHIATRIC:  She is alert and oriented x3.   HEENT:  Her eyes do not appear grossly erythematous or have exudate.    RESPIRATORY:  She is breathing comfortably without gross cough.      The remainder of the comprehensive physical exam was deferred secondary to the COVID-19 pandemic and secondary to the video visit restrictions.      Echocardiogram 10/13/2020 reveals an ejection fraction of 60%-65%, mitral valve prolapse with mild to moderate MR, normal PA pressure.  No change since 2019.          IMPRESSION:      Cruz is a 35-year-old lady whose cardiac history is significant for chronic musculoskeletal chest discomfort since .  She was followed for mild mitral valve prolapse with mild to moderate mitral insufficiency.      The chest discomfort that gets worse when she takes a deep breath or puffs her chest out has not been a problem in recent weeks.  She feels good from this standpoint.      Additionally, her echocardiogram 10/13/2020 reveals that her overall LV systolic function is normal and that her mitral valve prolapse with secondary mild to moderate mitral insufficiency has not changed.  Her PA pressures are normal and no significant change was reported since her last echo 2019.        PLAN:     1.  Continue present course.     2.  Recheck echocardiogram with lab work 1 year, earlier if needed.      Once again, it was a pleasure participating in the care of your patient, Ms. Cruz Obregon.  Please feel free to contact me at any time if any have questions regarding her care in the future.         Sincerely,      BANDAR HWEITT MD             D: 10/20/2020   T: 10/20/2020   MT: KAM      Name:     CRUZ OBREGON   MRN:      [id number]        Account:      [id number]   :      1985      Document: [id number]       cc: Lisa Carney MD

## 2020-10-20 NOTE — PROGRESS NOTES
"Cruz Obregon is a 35 year old female who is being evaluated via a billable video visit.      The patient has been notified of following:     \"This video visit will be conducted via a call between you and your physician/provider. We have found that certain health care needs can be provided without the need for an in-person physical exam.  This service lets us provide the care you need with a video conversation.  If a prescription is necessary we can send it directly to your pharmacy.  If lab work is needed we can place an order for that and you can then stop by our lab to have the test done at a later time.    Video visits are billed at different rates depending on your insurance coverage.  Please reach out to your insurance provider with any questions.    If during the course of the call the physician/provider feels a video visit is not appropriate, you will not be charged for this service.\"    Patient has given verbal consent for Video visit?yes  How would you like to obtain your AVS? mychart  If you are dropped from the video visit, the video invite should be resent to: text invite [phone]   Will anyone else be joining your video visit? no      Video-Visit Details    Type of service:  Video Visit    Video Start Time: 156pm    Video End Time: 212pm    Originating Location (pt. Location): Patient workplace    Distant Location (provider location):  home office       Platform used for Video Visit: Meena    See dictation # [id number]        "

## 2020-11-03 DIAGNOSIS — G47.00 INSOMNIA, UNSPECIFIED TYPE: ICD-10-CM

## 2020-11-03 RX ORDER — ZOLPIDEM TARTRATE 5 MG/1
5 TABLET ORAL
Qty: 30 TABLET | Refills: 0 | Status: SHIPPED | OUTPATIENT
Start: 2020-11-03 | End: 2020-12-14

## 2020-11-29 ENCOUNTER — HEALTH MAINTENANCE LETTER (OUTPATIENT)
Age: 35
End: 2020-11-29

## 2020-12-06 ENCOUNTER — MYC REFILL (OUTPATIENT)
Dept: FAMILY MEDICINE | Facility: CLINIC | Age: 35
End: 2020-12-06

## 2020-12-06 DIAGNOSIS — N76.0 BACTERIAL VAGINOSIS: ICD-10-CM

## 2020-12-06 DIAGNOSIS — G47.00 INSOMNIA, UNSPECIFIED TYPE: ICD-10-CM

## 2020-12-06 DIAGNOSIS — B96.89 BACTERIAL VAGINOSIS: ICD-10-CM

## 2020-12-06 DIAGNOSIS — N89.8 VAGINAL DISCHARGE: ICD-10-CM

## 2020-12-07 DIAGNOSIS — G47.00 INSOMNIA, UNSPECIFIED TYPE: ICD-10-CM

## 2020-12-07 DIAGNOSIS — N76.0 BACTERIAL VAGINOSIS: ICD-10-CM

## 2020-12-07 DIAGNOSIS — N89.8 VAGINAL DISCHARGE: ICD-10-CM

## 2020-12-07 DIAGNOSIS — B96.89 BACTERIAL VAGINOSIS: ICD-10-CM

## 2020-12-08 ENCOUNTER — TELEPHONE (OUTPATIENT)
Dept: FAMILY MEDICINE | Facility: CLINIC | Age: 35
End: 2020-12-08

## 2020-12-08 RX ORDER — METRONIDAZOLE 7.5 MG/G
GEL VAGINAL
Qty: 70 G | Refills: 0 | OUTPATIENT
Start: 2020-12-08

## 2020-12-08 RX ORDER — ZOLPIDEM TARTRATE 5 MG/1
TABLET ORAL
Qty: 30 TABLET | OUTPATIENT
Start: 2020-12-08

## 2020-12-08 NOTE — TELEPHONE ENCOUNTER
Reason for Call:  Other prescription    Detailed comments: Pt states that she is out of the following medications and is requesting a refill as soon as possible. Pharmacy is attached. Thank you.    metroNIDAZOLE (METROGEL) 0.75 % vaginal gel    zolpidem (AMBIEN) 5 MG tablet    Phone Number Patient can be reached at: Home number on file [phone] (home)    Best Time: Any    Can we leave a detailed message on this number? YES    Call taken on 12/8/2020 at 3:59 PM by Kathy Urbina

## 2020-12-08 NOTE — TELEPHONE ENCOUNTER
Routing refill request to provider for review/approval because:  Elicia given x1 and patient did not follow up, please advise  Drug not on the [facility] refill protocol     Next 5 appointments (look out 90 days)    Dec 15, 2020  1:00 PM  Nurse Only with CHIQUITA RN  [facility] ([facility] [address] [address]  [phone]   Jan 12, 2021  4:40 PM  MyChart Physical Adult with Lisa Carney MD  [facility] ([facility]) [address] [address]  [phone]          Gallo Barnett RN, BSN, PHN

## 2020-12-10 RX ORDER — METRONIDAZOLE 7.5 MG/G
1 GEL VAGINAL DAILY
Qty: 70 G | Refills: 0 | Status: SHIPPED | OUTPATIENT
Start: 2020-12-10 | End: 2021-01-21

## 2020-12-10 RX ORDER — ZOLPIDEM TARTRATE 5 MG/1
5 TABLET ORAL
Qty: 30 TABLET | Refills: 0 | OUTPATIENT
Start: 2020-12-10

## 2020-12-11 ENCOUNTER — NURSE TRIAGE (OUTPATIENT)
Dept: NURSING | Facility: CLINIC | Age: 35
End: 2020-12-11

## 2020-12-11 NOTE — TELEPHONE ENCOUNTER
Pt calls asking why her Ambien Rx has not been refilled. Disc'd w/ pt that request was submitted to provider; most likely not refilled because she was given mirtha refill x1 already and is in need of follow up visit. Pt requested to schedule appt w/ provider to discuss refill - warm transferred to .

## 2020-12-14 ENCOUNTER — VIRTUAL VISIT (OUTPATIENT)
Dept: FAMILY MEDICINE | Facility: CLINIC | Age: 35
End: 2020-12-14
Payer: COMMERCIAL

## 2020-12-14 DIAGNOSIS — R51.9 CHRONIC DAILY HEADACHE: ICD-10-CM

## 2020-12-14 DIAGNOSIS — N76.0 BACTERIAL VAGINOSIS: Primary | ICD-10-CM

## 2020-12-14 DIAGNOSIS — G44.211 INTRACTABLE EPISODIC TENSION-TYPE HEADACHE: ICD-10-CM

## 2020-12-14 DIAGNOSIS — B96.89 BACTERIAL VAGINOSIS: Primary | ICD-10-CM

## 2020-12-14 DIAGNOSIS — F51.01 PRIMARY INSOMNIA: ICD-10-CM

## 2020-12-14 PROCEDURE — 99214 OFFICE O/P EST MOD 30 MIN: CPT | Mod: 95 | Performed by: FAMILY MEDICINE

## 2020-12-14 RX ORDER — ZOLPIDEM TARTRATE 5 MG/1
5 TABLET ORAL
Qty: 30 TABLET | Refills: 3 | Status: SHIPPED | OUTPATIENT
Start: 2020-12-14 | End: 2021-04-05

## 2020-12-14 NOTE — PATIENT INSTRUCTIONS
At [facility], we strive to deliver an exceptional experience to you, every time we see you. If you receive a survey, please complete it as we do value your feedback.  If you have MyChart, you can expect to receive results automatically within 24 hours of their completion.  Your provider will send a note interpreting your results as well.   If you do not have MyChart, you should receive your results in about a week by mail.    Your care team:                            Family Medicine Internal Medicine   MD Josh Liu MD Shantel Branch-Fleming, MD Srinivasa Vaka, MD Katya Georgiev PA-C Megan Hill, APRN CNP    You Menard, MD Pediatrics   Antonio Alexandre, PABertramC  Anum Spicer, CNP MD Karen Renteria APRN CNP   MD Kim Curiel MD Deborah Mielke, MD Karen Yadav, APRN CNP  Anahi Looney, PABertramC  Beverly Moscoso, CNP  MD Mary Turner MD Angela Wermerskirchen, MD      Clinic hours: Monday - Thursday 7 am-7 pm; Fridays 7 am-5 pm.   Urgent care: Monday - Friday 11 am-9 pm; Saturday and Sunday 9 am-5 pm.    Clinic: [phone]       [facility]: Monday - Thursday 8 am - 7 pm; Friday 8 am - 6 pm  [facility]: [phone]     Use [email or web address] for 24/7 diagnosis and treatment of dozens of conditions.

## 2020-12-14 NOTE — PROGRESS NOTES
"Cruz Obregon is a 35 year old female who is being evaluated via a billable telephone visit.      The patient has been notified of following:     \"This telephone visit will be conducted via a call between you and your physician/provider. We have found that certain health care needs can be provided without the need for a physical exam.  This service lets us provide the care you need with a short phone conversation.  If a prescription is necessary we can send it directly to your pharmacy.  If lab work is needed we can place an order for that and you can then stop by our lab to have the test done at a later time.    Telephone visits are billed at different rates depending on your insurance coverage. During this emergency period, for some insurers they may be billed the same as an in-person visit.  Please reach out to your insurance provider with any questions.    If during the course of the call the physician/provider feels a telephone visit is not appropriate, you will not be charged for this service.\"    Patient has given verbal consent for Telephone visit?  Yes    What phone number would you like to be contacted at? See appt note    How would you like to obtain your AVS? MyChart    Subjective     Cruz Obregon is a 35 year old female who presents via phone visit today for the following health issues:  HPI       Insomnia - stable with ambien prn.  Sometimes with take two but not regularly.    Recurrently vaginitis - using metrogel as needed.    Migraines - wondering about amavik    Review of Systems   Constitutional, HEENT, cardiovascular, pulmonary, gi and gu systems are negative, except as otherwise noted.       Objective          Vitals:  No vitals were obtained today due to virtual visit.    healthy, alert and no distress  PSYCH: Alert and oriented times 3; coherent speech, normal   rate and volume, able to articulate logical thoughts, able   to abstract reason, no tangential thoughts, no hallucinations "   or delusions  Her affect is normal  RESP: No cough, no audible wheezing, able to talk in full sentences  Remainder of exam unable to be completed due to telephone visits    Orders Only on 07/09/2020   Component Date Value Ref Range Status     Treponema Antibodies 07/09/2020 Nonreactive  NR^Nonreactive Final    Comment: Methodology Change: Test performed on the [employer] Liaison XL by Treponema   pallidum Total Antibodies Assay as of 3.17.2020.       Specimen Description 07/09/2020 Vagina   Final     Wet Prep 07/09/2020 *  Final                    Value:Clue cells seen  Few       Wet Prep 07/09/2020 No Trichomonas seen   Final     Wet Prep 07/09/2020 No yeast seen   Final     Wet Prep 07/09/2020    Final                    Value:WBC'S seen  Few       HIV Antigen Antibody Combo 07/09/2020 Nonreactive  NR^Nonreactive     Final    HIV-1 p24 Ag & HIV-1/HIV-2 Ab Not Detected     Hep B Surface Agn 07/09/2020 Nonreactive  NR^Nonreactive Final     Hepatitis C Antibody 07/09/2020 Nonreactive  NR^Nonreactive Final    Comment: Assay performance characteristics have not been established for newborns,   infants, and children       Specimen Descrip 07/09/2020 Urine   Final     N Gonorrhea PCR 07/09/2020 Negative  NEG^Negative Final    Comment: Negative for N. gonorrhoeae rRNA by transcription mediated amplification.  A negative result by transcription mediated amplification does not preclude   the presence of N. gonorrhoeae infection because results are dependent on   proper and adequate collection, absence of inhibitors, and sufficient rRNA to   be detected.       Specimen Description 07/09/2020 Urine   Final     Chlamydia Trachomatis PCR 07/09/2020 Negative  NEG^Negative Final    Comment: Negative for C. trachomatis rRNA by transcription mediated amplification.  A negative result by transcription mediated amplification does not preclude   the presence of C. trachomatis infection because results are dependent on   proper and  adequate collection, absence of inhibitors, and sufficient rRNA to   be detected.               Assessment/Plan:    Assessment & Plan     Bacterial vaginosis  Stable - continue metrogel for this recurrent problem    Primary insomnia  Stable - continue prn use and discussed that 10 mg is not approved in women.  - zolpidem (AMBIEN) 5 MG tablet; Take 1 tablet (5 mg) by mouth nightly as needed for sleep    Intractable episodic tension-type headache  Referral for evaluation since current neurologist has retired.  - NEUROLOGY ADULT REFERRAL    Chronic daily headache  As above  - NEUROLOGY ADULT REFERRAL        The uses and side effects, including black box warnings as appropriate, were discussed in detail.  All patient questions were answered.  The patient was instructed to call immediately if any side effects developed.     Return in about 3 months (around 3/14/2021).    Lisa Carney MD  [facility]    Phone call duration:  12 minutes

## 2020-12-15 ENCOUNTER — ALLIED HEALTH/NURSE VISIT (OUTPATIENT)
Dept: NURSING | Facility: CLINIC | Age: 35
End: 2020-12-15
Payer: COMMERCIAL

## 2020-12-15 VITALS — WEIGHT: 139.8 LBS | SYSTOLIC BLOOD PRESSURE: 116 MMHG | DIASTOLIC BLOOD PRESSURE: 77 MMHG | BODY MASS INDEX: 21.9 KG/M2

## 2020-12-15 DIAGNOSIS — Z30.42 DEPO-PROVERA CONTRACEPTIVE STATUS: Primary | ICD-10-CM

## 2020-12-15 PROCEDURE — 96372 THER/PROPH/DIAG INJ SC/IM: CPT

## 2020-12-15 PROCEDURE — 99207 PR NO CHARGE NURSE ONLY: CPT

## 2020-12-15 NOTE — PROGRESS NOTES
BP: Data Unavailable    LAST PAP/EXAM:   Lab Results   Component Value Date    PAP NIL 04/24/2017     URINE HCG:not indicated    The following medication was given:     MEDICATION: Depo Provera 150mg  ROUTE: IM  SITE: Ventrogluteal - Left  : Amphastar     NEXT INJECTION DUE: 3/2/21 - 3/16/21     Follow Up Injection    Patient returning during stated date range given at previous visit: Yes  If here at the correct interval:   BP Readings from Last 1 Encounters:   12/15/20 116/77     Wt Readings from Last 1 Encounters:   12/15/20 63.4 kg (139 lb 12.8 oz)       Side effects or problems with last injection?  No.  Date range is given to patient for next dose: yes, and writer assisted in scheduling for next depo within her window.     Cayla Martinez RN

## 2021-01-11 ENCOUNTER — OFFICE VISIT (OUTPATIENT)
Dept: FAMILY MEDICINE | Facility: CLINIC | Age: 36
End: 2021-01-11
Payer: COMMERCIAL

## 2021-01-11 VITALS
TEMPERATURE: 97.9 F | HEART RATE: 77 BPM | HEIGHT: 67 IN | DIASTOLIC BLOOD PRESSURE: 74 MMHG | WEIGHT: 146 LBS | OXYGEN SATURATION: 96 % | BODY MASS INDEX: 22.91 KG/M2 | SYSTOLIC BLOOD PRESSURE: 122 MMHG | RESPIRATION RATE: 16 BRPM

## 2021-01-11 DIAGNOSIS — Z11.3 SCREEN FOR STD (SEXUALLY TRANSMITTED DISEASE): ICD-10-CM

## 2021-01-11 DIAGNOSIS — Z00.00 ROUTINE GENERAL MEDICAL EXAMINATION AT A HEALTH CARE FACILITY: Primary | ICD-10-CM

## 2021-01-11 DIAGNOSIS — Z23 NEED FOR PROPHYLACTIC VACCINATION WITH TETANUS-DIPHTHERIA (TD): ICD-10-CM

## 2021-01-11 PROCEDURE — 36415 COLL VENOUS BLD VENIPUNCTURE: CPT | Performed by: FAMILY MEDICINE

## 2021-01-11 PROCEDURE — 86803 HEPATITIS C AB TEST: CPT | Performed by: FAMILY MEDICINE

## 2021-01-11 PROCEDURE — 90471 IMMUNIZATION ADMIN: CPT | Performed by: FAMILY MEDICINE

## 2021-01-11 PROCEDURE — 87389 HIV-1 AG W/HIV-1&-2 AB AG IA: CPT | Performed by: FAMILY MEDICINE

## 2021-01-11 PROCEDURE — 87624 HPV HI-RISK TYP POOLED RSLT: CPT | Performed by: FAMILY MEDICINE

## 2021-01-11 PROCEDURE — 86780 TREPONEMA PALLIDUM: CPT | Mod: 90 | Performed by: FAMILY MEDICINE

## 2021-01-11 PROCEDURE — G0145 SCR C/V CYTO,THINLAYER,RESCR: HCPCS | Performed by: FAMILY MEDICINE

## 2021-01-11 PROCEDURE — 90714 TD VACC NO PRESV 7 YRS+ IM: CPT | Performed by: FAMILY MEDICINE

## 2021-01-11 PROCEDURE — 99395 PREV VISIT EST AGE 18-39: CPT | Mod: 25 | Performed by: FAMILY MEDICINE

## 2021-01-11 PROCEDURE — 99000 SPECIMEN HANDLING OFFICE-LAB: CPT | Performed by: FAMILY MEDICINE

## 2021-01-11 PROCEDURE — 87340 HEPATITIS B SURFACE AG IA: CPT | Performed by: FAMILY MEDICINE

## 2021-01-11 ASSESSMENT — MIFFLIN-ST. JEOR: SCORE: 1399.88

## 2021-01-11 ASSESSMENT — PAIN SCALES - GENERAL: PAINLEVEL: NO PAIN (0)

## 2021-01-11 NOTE — NURSING NOTE
Prior to immunization administration, verified patients identity using patient s name and date of birth. Please see Immunization Activity for additional information.     Screening Questionnaire for Adult Immunization    Are you sick today?   No   Do you have allergies to medications, food, a vaccine component or latex?   No   Have you ever had a serious reaction after receiving a vaccination?   No   Do you have a long-term health problem with heart, lung, kidney, or metabolic disease (e.g., diabetes), asthma, a blood disorder, no spleen, complement component deficiency, a cochlear implant, or a spinal fluid leak?  Are you on long-term aspirin therapy?   No   Do you have cancer, leukemia, HIV/AIDS, or any other immune system problem?   No   Do you have a parent, brother, or sister with an immune system problem?   No   In the past 3 months, have you taken medications that affect  your immune system, such as prednisone, other steroids, or anticancer drugs; drugs for the treatment of rheumatoid arthritis, Crohn s disease, or psoriasis; or have you had radiation treatments?   No   Have you had a seizure, or a brain or other nervous system problem?   No   During the past year, have you received a transfusion of blood or blood    products, or been given immune (gamma) globulin or antiviral drug?   No   For women: Are you pregnant or is there a chance you could become       pregnant during the next month?   No   Have you received any vaccinations in the past 4 weeks?   No     Immunization questionnaire answers were all negative.       Patient instructed to remain in clinic for 15 minutes afterwards, and to report any adverse reaction to me immediately.       Screening performed by uJju Ortega MA on 1/11/2021 at 10:32 AM.

## 2021-01-11 NOTE — PROGRESS NOTES
SUBJECTIVE:   CC: Cruz Obregon is an 35 year old woman who presents for preventive health visit.     Patient has been advised of split billing requirements and indicates understanding: Yes  Healthy Habits:    Do you get at least three servings of calcium containing foods daily (dairy, green leafy vegetables, etc.)? No    Amount of exercise or daily activities, outside of work: 0 day(s) per week    Problems taking medications regularly No    Medication side effects: No    Have you had an eye exam in the past two years? Yes    Do you see a dentist twice per year? yes    Do you have sleep apnea, excessive snoring or daytime drowsiness?no        Today's PHQ-2 Score:   PHQ-2 ( 1999 Pfizer) 1/11/2021 4/30/2020   Q1: Little interest or pleasure in doing things 0 0   Q2: Feeling down, depressed or hopeless 0 0   PHQ-2 Score 0 0       Abuse: Current or Past(Physical, Sexual or Emotional)- No  Do you feel safe in your environment? Yes        Social History     Tobacco Use     Smoking status: Never Smoker     Smokeless tobacco: Never Used   Substance Use Topics     Alcohol use: No     Alcohol/week: 0.0 standard drinks     If you drink alcohol do you typically have >3 drinks per day or >7 drinks per week? No                     Reviewed orders with patient.  Reviewed health maintenance and updated orders accordingly - Yes        Pertinent mammograms are reviewed under the imaging tab.  History of abnormal Pap smear: NO - age 30-65 PAP every 5 years with negative HPV co-testing recommended  PAP / HPV 4/24/2017 2/18/2014 9/23/2013   PAP NIL NIL NIL     Reviewed and updated as needed this visit by clinical staff  Tobacco  Allergies  Meds  Problems  Med Hx  Surg Hx  Fam Hx  Soc Hx          Reviewed and updated as needed this visit by Provider  Tobacco  Allergies  Meds  Problems  Med Hx  Surg Hx  Fam Hx             ROS:  10 point ROS of systems including Constitutional, Eyes, Respiratory, Cardiovascular,  "Gastroenterology, Genitourinary, Integumentary, Muscularskeletal, Psychiatric were all negative except for pertinent positives noted in my HPI.     OBJECTIVE:   /74 (BP Location: Left arm, Patient Position: Sitting, Cuff Size: Adult Regular)   Pulse 77   Temp 97.9  F (36.6  C) (Tympanic)   Resp 16   Ht 1.702 m (5' 7\")   Wt 66.2 kg (146 lb)   SpO2 96%   BMI 22.87 kg/m    EXAM:  GENERAL: healthy, alert and no distress  EYES: Eyes grossly normal to inspection, PERRL and conjunctivae and sclerae normal  HENT: ear canals and TM's normal, nose and mouth without ulcers or lesions  NECK: no adenopathy, no asymmetry, masses, or scars and thyroid normal to palpation  RESP: lungs clear to auscultation - no rales, rhonchi or wheezes  BREAST: normal without masses, tenderness or nipple discharge and no palpable axillary masses or adenopathy  CV: regular rate and rhythm, normal S1 S2, no S3 or S4, no murmur, click or rub, no peripheral edema and peripheral pulses strong  ABDOMEN: soft, nontender, no hepatosplenomegaly, no masses and bowel sounds normal  MS: no gross musculoskeletal defects noted, no edema  SKIN: no suspicious lesions or rashes  NEURO: Normal strength and tone, mentation intact and speech normal  PSYCH: mentation appears normal, affect normal/bright    Diagnostic Test Results:  Labs reviewed in Epic    ASSESSMENT/PLAN:   1. Routine general medical examination at a health care facility  Routine preventive discussed. Stable on current medications.  - Pap imaged thin layer screen with HPV - recommended age 30 - 65 years (select HPV order below)    2. Need for prophylactic vaccination with tetanus-diphtheria (Td)    - TD PRESERV FREE, IM (7+ YRS)  - ADMIN 1st VACCINE    3. Screen for STD (sexually transmitted disease)  screening  - Hepatitis C antibody  - Hepatitis B surface antigen  - HIV Antigen Antibody Combo  - Treponema Abs w Reflex to RPR and Titer    Patient has been advised of split billing " "requirements and indicates understanding: Yes  COUNSELING:   Reviewed preventive health counseling, as reflected in patient instructions    Estimated body mass index is 22.87 kg/m  as calculated from the following:    Height as of this encounter: 1.702 m (5' 7\").    Weight as of this encounter: 66.2 kg (146 lb).        She reports that she has never smoked. She has never used smokeless tobacco.      Counseling Resources:  ATP IV Guidelines  Pooled Cohorts Equation Calculator  Breast Cancer Risk Calculator  BRCA-Related Cancer Risk Assessment: FHS-7 Tool  FRAX Risk Assessment  ICSI Preventive Guidelines  Dietary Guidelines for Americans, 2010  USDA's MyPlate  ASA Prophylaxis  Lung CA Screening    Lisa Carney MD  [facility]  "

## 2021-01-12 LAB
HBV SURFACE AG SERPL QL IA: NONREACTIVE
HCV AB SERPL QL IA: NONREACTIVE
HIV 1+2 AB+HIV1 P24 AG SERPL QL IA: NONREACTIVE
T PALLIDUM AB SER QL: NONREACTIVE

## 2021-01-12 NOTE — RESULT ENCOUNTER NOTE
MsDagoberto Obregon,    Neither hepatitis B, hepatitis C, HIV nor syphilis were found.     Please contact the clinic if you have additional questions.  Thank you.    Sincerely,    Lisa Carney MD

## 2021-01-13 LAB
COPATH REPORT: NORMAL
PAP: NORMAL

## 2021-01-18 ENCOUNTER — PATIENT OUTREACH (OUTPATIENT)
Dept: FAMILY MEDICINE | Facility: CLINIC | Age: 36
End: 2021-01-18

## 2021-01-18 LAB
FINAL DIAGNOSIS: ABNORMAL
HPV HR 12 DNA CVX QL NAA+PROBE: POSITIVE
HPV16 DNA SPEC QL NAA+PROBE: NEGATIVE
HPV18 DNA SPEC QL NAA+PROBE: NEGATIVE
SPECIMEN DESCRIPTION: ABNORMAL
SPECIMEN SOURCE CVX/VAG CYTO: ABNORMAL

## 2021-01-18 NOTE — TELEPHONE ENCOUNTER
11/17/10 pap- ASCUS + LR HPV (54). Plan-- per protocol, repeat screening pap in one year  1/4/12 pap ASCUS, + HR HPV (66) and LR HPV ( 6, 72). Plan-- colposcopy within 3 months.  1/30/12 colposcopy. bx- inflammation. Plan-- pap in 6 months. (due 7/30/12)   9/10/12 pap ASCUS neg HPV. Plan-- Follow up in 3 months if still mild changes.(due 12/10/12)   12/12/12 pap NIL, + HR HPV (52). Plan- colposcopy  1/14/13 colposcopy scheduled. Cancelled  1/17/13 scheduled/cancelled  2/26/13 colposcopy.no visible pathology/no bx taken.  Plan-- repeat pap in 6 months. (due 8/26/13)   9/23/13 pap NIL. Plan-- repeat pap/HPV on or about 2/26/14 (one year from colp)  2/18/14 pap NIL/neg HPV. Plan: pap in 3 years.  4/24/17 NIL pap/neg HR HPV. Routine screening.  1/11/21 NIL pap, + HR HPV (not 16 or 18). Plan cotest in 1 year due bef 1/11/22.

## 2021-01-21 ENCOUNTER — VIRTUAL VISIT (OUTPATIENT)
Dept: NEUROLOGY | Facility: CLINIC | Age: 36
End: 2021-01-21
Attending: FAMILY MEDICINE
Payer: COMMERCIAL

## 2021-01-21 DIAGNOSIS — G44.211 INTRACTABLE EPISODIC TENSION-TYPE HEADACHE: ICD-10-CM

## 2021-01-21 DIAGNOSIS — G43.719 INTRACTABLE CHRONIC MIGRAINE WITHOUT AURA AND WITHOUT STATUS MIGRAINOSUS: Primary | ICD-10-CM

## 2021-01-21 PROCEDURE — 99214 OFFICE O/P EST MOD 30 MIN: CPT | Mod: 95 | Performed by: PSYCHIATRY & NEUROLOGY

## 2021-01-21 RX ORDER — MEDROXYPROGESTERONE ACETATE 150 MG/ML
150 INJECTION, SUSPENSION INTRAMUSCULAR
COMMUNITY
End: 2023-09-19

## 2021-01-21 NOTE — PROGRESS NOTES
Visit Date:   01/21/2021      This is a virtual video visit being done virtually because of COVID-19.      Cruz Obregon is a 35-year-old female referred by Dr. Sharon Carney for evaluation and treatment of chronic migraine and tension headaches.      She indicates she has had migraine-type headaches her entire life and over the years, they have increased in their severity and frequency.  She indicates her typical headache starts behind her ears and then spreads to the front of her head.  It is a severe headache with photophobia and phonophobia.  Occasionally, she has had nausea and vomiting.  The headaches can last an entire day.  She has no aura or focal neurologic symptoms associated with the headaches.  She is currently having 4 headache days a week.      She also reports frontal pressure-type headaches that are much less frequent and have features suggesting tension-type headaches.      I do note she saw Dr. Eron Duval in our department, prior to his [facility], in 2019.  He tried her on nortriptyline, which was ineffective.  Sumatriptan was ineffective.  He discussed topiramate and propranolol with her, but she declined due to potential side effects.  I again discussed  these 2 drugs with her.  She is concerned about propranolol because of the effects on blood pressure and her heart.  She indicates she has a heart murmur.  She would not want to take topiramate, because it might cause weight loss.      Currently, she generally takes nothing for her headaches.  About 6-8 times a month, she might take Excedrin Migraine.      She did have a brain MRI scan done on 01/03/2011, which I reviewed.  It is a normal study.      She is interested in trying Aimovig, because this was the only thing her mother, who has migraine, has found to help with her migraines.      PAST MEDICAL HISTORY:  Aside from a report of a heart murmur, it is negative.      CURRENT MEDICATIONS:     1.  Depo-Provera every 3 months.   2.   Ambien.      ALLERGIES:  SHE IS ALLERGIC TO SULFA DRUGS.      FAMILY HISTORY:  Strongly positive for migraine, including her father, mother, sisters, and paternal grandmother.      SOCIAL HISTORY:  She does not work outside the home.  She does not smoke or use alcohol.      NEUROLOGIC EXAMINATION:  The patient is alert and cooperative.  She has full extraocular motility.  She has a symmetric smile.  Speech is clear.  She moves her arms well.  Finger-to-nose is done accurately.  Her gait appears normal.  She is able to tandem walk.      IMPRESSION:   1.  Chronic migraine without aura.   2.  Episodic tension headaches.      PLAN:  The patient is really interested in trying Aimovig as a preventative.  She tells me her mother had good success with this where other treatments for her mother had proven ineffective.      I did tell her sometimes Aimovig is difficult to get approval for due to insurance, but I think it is a viable option for her.  I did suggest a referral to the Headache Clinic at the [facility] for an opinion, as well as help with getting Aimovig, if felt appropriate.  I also stated that typically the staff at the Headache Clinic likes to have the patient come in for their first injection.      She is comfortable with this approach, and I did make a Headache Clinic referral for her.      ADDENDUM:  Total visit time was 30 minutes.  This included record review, interaction with the patient, care coordination, and documentation.         LANDEN PACHECO MD             D: 2021   T: 2021   MT: SUZANNE      Name:     JOSELUIS PEREIRA   MRN:      [id number]        Account:      [id number]   :      1985           Visit Date:   2021      Document: [id number]       cc: Lisa Carney MD

## 2021-01-21 NOTE — PROGRESS NOTES
Cruz is a 35 year old who is being evaluated via a billable video visit.      How would you like to obtain your AVS? MyChart  If the video visit is dropped, the invitation should be resent by: Text to cell phone: [phone]  Will anyone else be joining your video visit? Yuliana Calvert LPN

## 2021-02-26 ENCOUNTER — ANCILLARY PROCEDURE (OUTPATIENT)
Dept: GENERAL RADIOLOGY | Facility: CLINIC | Age: 36
End: 2021-02-26
Attending: NURSE PRACTITIONER
Payer: COMMERCIAL

## 2021-02-26 ENCOUNTER — NURSE TRIAGE (OUTPATIENT)
Dept: FAMILY MEDICINE | Facility: CLINIC | Age: 36
End: 2021-02-26

## 2021-02-26 ENCOUNTER — VIRTUAL VISIT (OUTPATIENT)
Dept: FAMILY MEDICINE | Facility: CLINIC | Age: 36
End: 2021-02-26
Payer: COMMERCIAL

## 2021-02-26 DIAGNOSIS — R10.84 ABDOMINAL PAIN, GENERALIZED: ICD-10-CM

## 2021-02-26 DIAGNOSIS — K59.00 CONSTIPATION, UNSPECIFIED CONSTIPATION TYPE: ICD-10-CM

## 2021-02-26 DIAGNOSIS — R11.2 NON-INTRACTABLE VOMITING WITH NAUSEA, UNSPECIFIED VOMITING TYPE: ICD-10-CM

## 2021-02-26 DIAGNOSIS — B37.31 CANDIDIASIS OF VAGINA: Primary | ICD-10-CM

## 2021-02-26 DIAGNOSIS — R11.2 NON-INTRACTABLE VOMITING WITH NAUSEA, UNSPECIFIED VOMITING TYPE: Primary | ICD-10-CM

## 2021-02-26 LAB
ALBUMIN SERPL-MCNC: 4.4 G/DL (ref 3.4–5)
ALBUMIN UR-MCNC: NEGATIVE MG/DL
ALP SERPL-CCNC: 52 U/L (ref 40–150)
ALT SERPL W P-5'-P-CCNC: 22 U/L (ref 0–50)
ANION GAP SERPL CALCULATED.3IONS-SCNC: 6 MMOL/L (ref 3–14)
APPEARANCE UR: ABNORMAL
AST SERPL W P-5'-P-CCNC: 14 U/L (ref 0–45)
BACTERIA #/AREA URNS HPF: ABNORMAL /HPF
BASOPHILS # BLD AUTO: 0 10E9/L (ref 0–0.2)
BASOPHILS NFR BLD AUTO: 1 %
BILIRUB SERPL-MCNC: 0.4 MG/DL (ref 0.2–1.3)
BILIRUB UR QL STRIP: NEGATIVE
BUN SERPL-MCNC: 6 MG/DL (ref 7–30)
CALCIUM SERPL-MCNC: 9.6 MG/DL (ref 8.5–10.1)
CHLORIDE SERPL-SCNC: 108 MMOL/L (ref 94–109)
CO2 SERPL-SCNC: 25 MMOL/L (ref 20–32)
COLOR UR AUTO: YELLOW
CREAT SERPL-MCNC: 0.74 MG/DL (ref 0.52–1.04)
DIFFERENTIAL METHOD BLD: ABNORMAL
EOSINOPHIL # BLD AUTO: 0 10E9/L (ref 0–0.7)
EOSINOPHIL NFR BLD AUTO: 1 %
ERYTHROCYTE [DISTWIDTH] IN BLOOD BY AUTOMATED COUNT: 12.3 % (ref 10–15)
GFR SERPL CREATININE-BSD FRML MDRD: >90 ML/MIN/{1.73_M2}
GLUCOSE SERPL-MCNC: 78 MG/DL (ref 70–99)
GLUCOSE UR STRIP-MCNC: NEGATIVE MG/DL
HCG UR QL: NEGATIVE
HCT VFR BLD AUTO: 43.4 % (ref 35–47)
HGB BLD-MCNC: 14.3 G/DL (ref 11.7–15.7)
HGB UR QL STRIP: NEGATIVE
KETONES UR STRIP-MCNC: 40 MG/DL
LEUKOCYTE ESTERASE UR QL STRIP: NEGATIVE
LYMPHOCYTES # BLD AUTO: 1.6 10E9/L (ref 0.8–5.3)
LYMPHOCYTES NFR BLD AUTO: 60 %
MCH RBC QN AUTO: 31.4 PG (ref 26.5–33)
MCHC RBC AUTO-ENTMCNC: 32.9 G/DL (ref 31.5–36.5)
MCV RBC AUTO: 95 FL (ref 78–100)
MONOCYTES # BLD AUTO: 0.4 10E9/L (ref 0–1.3)
MONOCYTES NFR BLD AUTO: 13 %
MUCOUS THREADS #/AREA URNS LPF: PRESENT /LPF
NEUTROPHILS # BLD AUTO: 0.7 10E9/L (ref 1.6–8.3)
NEUTROPHILS NFR BLD AUTO: 25 %
NITRATE UR QL: NEGATIVE
NON-SQ EPI CELLS #/AREA URNS LPF: ABNORMAL /LPF
PH UR STRIP: 7.5 PH (ref 5–7)
PLATELET # BLD AUTO: 250 10E9/L (ref 150–450)
PLATELET # BLD EST: ABNORMAL 10*3/UL
POTASSIUM SERPL-SCNC: 3.9 MMOL/L (ref 3.4–5.3)
PROT SERPL-MCNC: 8.2 G/DL (ref 6.8–8.8)
RBC # BLD AUTO: 4.56 10E12/L (ref 3.8–5.2)
RBC #/AREA URNS AUTO: ABNORMAL /HPF
RBC MORPH BLD: NORMAL
SODIUM SERPL-SCNC: 139 MMOL/L (ref 133–144)
SOURCE: ABNORMAL
SP GR UR STRIP: 1.02 (ref 1–1.03)
SPECIMEN SOURCE: ABNORMAL
UROBILINOGEN UR STRIP-ACNC: 0.2 EU/DL (ref 0.2–1)
VARIANT LYMPHS BLD QL SMEAR: PRESENT
WBC # BLD AUTO: 2.7 10E9/L (ref 4–11)
WBC #/AREA URNS AUTO: ABNORMAL /HPF
WET PREP SPEC: ABNORMAL

## 2021-02-26 PROCEDURE — 85025 COMPLETE CBC W/AUTO DIFF WBC: CPT | Performed by: NURSE PRACTITIONER

## 2021-02-26 PROCEDURE — 87210 SMEAR WET MOUNT SALINE/INK: CPT | Performed by: NURSE PRACTITIONER

## 2021-02-26 PROCEDURE — 99214 OFFICE O/P EST MOD 30 MIN: CPT | Mod: 95 | Performed by: NURSE PRACTITIONER

## 2021-02-26 PROCEDURE — 81001 URINALYSIS AUTO W/SCOPE: CPT | Performed by: NURSE PRACTITIONER

## 2021-02-26 PROCEDURE — 81025 URINE PREGNANCY TEST: CPT | Performed by: NURSE PRACTITIONER

## 2021-02-26 PROCEDURE — 87591 N.GONORRHOEAE DNA AMP PROB: CPT | Performed by: NURSE PRACTITIONER

## 2021-02-26 PROCEDURE — 74019 RADEX ABDOMEN 2 VIEWS: CPT | Performed by: RADIOLOGY

## 2021-02-26 PROCEDURE — 80053 COMPREHEN METABOLIC PANEL: CPT | Performed by: NURSE PRACTITIONER

## 2021-02-26 PROCEDURE — 87491 CHLMYD TRACH DNA AMP PROBE: CPT | Performed by: NURSE PRACTITIONER

## 2021-02-26 PROCEDURE — 36415 COLL VENOUS BLD VENIPUNCTURE: CPT | Performed by: NURSE PRACTITIONER

## 2021-02-26 RX ORDER — FLUCONAZOLE 150 MG/1
150 TABLET ORAL
Qty: 2 TABLET | Refills: 0 | Status: SHIPPED | OUTPATIENT
Start: 2021-02-26 | End: 2021-03-02

## 2021-02-26 RX ORDER — ONDANSETRON 4 MG/1
4 TABLET, ORALLY DISINTEGRATING ORAL EVERY 6 HOURS PRN
Qty: 20 TABLET | Refills: 0 | Status: SHIPPED | OUTPATIENT
Start: 2021-02-26 | End: 2021-03-24

## 2021-02-26 NOTE — PROGRESS NOTES
Cruz is a 35 year old who is being evaluated via a billable video visit.      How would you like to obtain your AVS? MyChart  If the video visit is dropped, the invitation should be resent by: Text to cell phone:    Will anyone else be joining your video visit? No    Video Start Time: 9:21 AM    Assessment & Plan     Non-intractable vomiting with nausea, unspecified vomiting type  Plan for patient to come in to lab/xray.  Follow up based on findings.   - *UA reflex to Microscopic and Culture ([facility] (except [place] and [place]); Future  - Comprehensive metabolic panel (BMP + Alb, Alk Phos, ALT, AST, Total. Bili, TP); Future  - CBC with platelets and differential; Future  - HCG Qual, Urine ([id number]); Future  - XR Abdomen 2 Views; Future  - Wet prep; Future  - NEISSERIA GONORRHOEA PCR; Future  - CHLAMYDIA TRACHOMATIS PCR; Future    Abdominal pain, generalized  As above.   - *UA reflex to Microscopic and Culture ([facility] (except [place] and [place]); Future  - Comprehensive metabolic panel (BMP + Alb, Alk Phos, ALT, AST, Total. Bili, TP); Future  - CBC with platelets and differential; Future  - HCG Qual, Urine ([id number]); Future  - XR Abdomen 2 Views; Future  - Wet prep; Future  - NEISSERIA GONORRHOEA PCR; Future  - CHLAMYDIA TRACHOMATIS PCR; Future      20 minutes spent on the date of the encounter doing chart review, history and exam, documentation and further activities as noted above       There are no Patient Instructions on file for this visit.    Return today (on 2/26/2021) for lab visit.    Anum Spicer, FIONA CNP  [facility]   Cruz is a 35 year old who presents for the following health issues     HPI     Nausea since going out last weekend (4 days ago) and drinking alcohol.   Not able to eat much.   Able to tolerate liquids.  Last time she vomited was Sunday (4 days ago),  Nauseated every day with headache and  abdominal pain.   Not really eating. Yesterday had some soup and was ok.  Day before tried spaghetti and rice and caused her to feel nauseous.    Bowel movement yesterday.  Normal yesterday.  Does not feel constipated.  Does feel like she has to go.  Bowel movement prior to yesterday was daily.      Some irritation with urinating in vaginal area, no possibility of pregnancy, up to date on depo shot.    Review of Systems   Constitutional, HEENT, cardiovascular, pulmonary, GI, , musculoskeletal, neuro, skin, endocrine and psych systems are negative, except as otherwise noted.      Objective           Vitals:  No vitals were obtained today due to virtual visit.    Physical Exam   GENERAL: Healthy, alert and no distress  EYES: Eyes grossly normal to inspection.  No discharge or erythema, or obvious scleral/conjunctival abnormalities.  RESP: No audible wheeze, cough, or visible cyanosis.  No visible retractions or increased work of breathing.    SKIN: Visible skin clear. No significant rash, abnormal pigmentation or lesions.  NEURO: Cranial nerves grossly intact.  Mentation and speech appropriate for age.  PSYCH: Mentation appears normal, affect normal/bright, judgement and insight intact, normal speech and appearance well-groomed.    Office Visit on 01/11/2021   Component Date Value Ref Range Status     PAP 01/11/2021 NIL   Final     Copath Report 01/11/2021    Final                    Value:  Patient Name: JOSELUIS PEREIRA  MR#: [id number]  Specimen #:   Collected: 1/11/2021  Received: 1/12/2021  Reported: 1/13/2021 14:53  Ordering Phy(s): GUSTAVO ANTUNEZ    For improved result formatting, select 'View Enhanced Report Format' under   Linked Documents section.    SPECIMEN/STAIN PROCESS:  Pap imaged thin layer prep screening (Surepath, FocalPoint with guided   screening)       Pap-Cyto x 1, HPV ordered x 1    SOURCE: Cervical,  endocervical  ----------------------------------------------------------------   Pap imaged thin layer prep screening (Surepath, FocalPoint with guided   screening)  SPECIMEN ADEQUACY:  Satisfactory for evaluation.  -Transformation zone component absent.    CYTOLOGIC INTERPRETATION:    Negative for intraepithelial lesion or malignancy    Electronically signed out by:  Corey GILL, (ASCP)    CLINICAL HISTORY:    Injection, A previous normal pap  Date of Last Pap: 04/24/2017,    Papanicolaou Test Limitations:  Cer                          vical cytology is a screening test with   limited sensitivity; regular  screening is critical for cancer prevention; Pap tests are primarily   effective for the diagnosis/prevention of  squamous cell carcinoma, not adenocarcinomas or other cancers.    COLLECTION SITE:  Client:  [facility]  Location: [facility] (B)    The technical component of this testing was completed at the [facility], with the professional component performed   at the [facility], [address] [place] [id number] ([phone])         Hepatitis C Antibody 01/11/2021 Nonreactive  NR^Nonreactive Final    Comment: Assay performance characteristics have not been established for newborns,   infants, and children       Hep B Surface Agn 01/11/2021 Nonreactive  NR^Nonreactive Final     HIV Antigen Antibody Combo 01/11/2021 Nonreactive  NR^Nonreactive     Final    HIV-1 p24 Ag & HIV-1/HIV-2 Ab Not Detected     Treponema Antibodies 01/11/2021 Nonreactive  NR^Nonreactive Final    Comment: Methodology Change: Test performed on the [employer] XL by Treponema   pallidum Total Antibodies Assay as of 3.17.2020.       HPV Source 01/11/2021 SurePath   Final     HPV 16 DNA 01/11/2021 Negative  NEG^Negative Final     HPV 18 DNA 01/11/2021 Negative   NEG^Negative Final     Other HR HPV 01/11/2021 Positive* NEG^Negative Final     Final Diagnosis 01/11/2021    Final                    Value:This patient's sample is positive for other HR HPV DNA (types 31, 33, 35, 39, 45, 51, 52,   56, 58, 59, 66 or 68), not HPV 16 or HPV 18 DNA. This result requires clinical correlation   with concurrent cytology findings.      Comment: This test was developed and its performance characteristics determined by the   [facility], [facility]. It   has not been cleared or approved by the FDA. The laboratory is regulated under   CLIA as qualified to perform high-complexity testing. This test is used for   clinical purposes. It should not be regarded as investigational or for   research.  (Note)  METHODOLOGY:  The Roche chandana 4800 system uses automated extraction,   simultaneous amplification of HPV (L1 region) and beta-globin,    followed by  real time detection of fluorescent labeled HPV and beta   globin using specific oligonucleotide probes . The test specifically   identifies types HPV 16 DNA and HPV 18 DNA while concurrently   detecting the rest of the high risk types (31, 33, 35, 39, 45, 51,   52, 56, 58, 59, 66 or 68).  COMMENTS:  This test is not intended for use as a screening device   for women under age 30 with normal cervical cytology.  Results should   be correl                           ated with cytologic and histologic findings. Close clinical   followup is recommended.       Specimen Description 01/11/2021 Cervical Cells   Final               Video-Visit Details    Type of service:  Video Visit    Video End Time:9:51 AM    Originating Location (pt. Location): Home    Distant Location (provider location):  [facility]     Platform used for Video Visit: [employer]

## 2021-02-26 NOTE — TELEPHONE ENCOUNTER
S-(situation): Patient went out last weekend and had some adult beverages. She had not felt right ever since and has been nauseated ever since. Not able to eat much and maybe once a day.     B-(background): Drinking alcohol last weekend and stomach has been nauseated ever since. Thinking something is wrong with her stomach beyond a hangover    A-(assessment): Nausea, some vomiting, no appetite. Drinking plenty of fluids and Gatorade. Cannot eat much and eating very little and bland foods.     R-(recommendations): Be seen, patient scheduled for virtual visit today.      Additional Information    Negative: Shock suspected (e.g., cold/pale/clammy skin, too weak to stand, low BP, rapid pulse)    Negative: Sounds like a life-threatening emergency to the triager    Negative: Nausea or vomiting and pregnancy < 20 weeks    Negative: Menstrual Period - Missed or Late (i.e., pregnancy suspected)    Negative: Heat exhaustion suspected (i.e., dehydration from heat exposure)    Negative: Anxiety or stress suspected (i.e., nausea with anxiety attacks or stressful situations)    Negative: Traumatic Brain Injury (TBI) suspected    Negative: Vomiting occurs    Negative: Other symptom is present, see that guideline.  (e.g., chest pain, headache, dizziness, abdominal pain, colds, sore throat, etc.).    Negative: Unable to walk, or can only walk with assistance (e.g., requires support)    Negative: Difficulty breathing    Negative: Insulin-dependent diabetes (Type I) and glucose > 400 mg/dL (22 mmol/L)    Negative: Drinking very little and dehydration suspected (e.g., no urine > 12 hours, very dry mouth, very lightheaded)    Negative: Patient sounds very sick or weak to the triager    Negative: Fever > 104 F (40 C)    Negative: Fever > 101 F (38.3 C) and age > 60    Negative: Fever > 100.0 F (37.8 C) and bedridden (e.g., nursing home patient, CVA, chronic illness, recovering from surgery)    Negative: Fever > 100.0 F (37.8 C) and  diabetes mellitus or weak immune system (e.g., HIV positive, cancer chemo, splenectomy, chronic steroids)    Negative: Taking any of the following medications: digoxin (Lanoxin), lithium, theophylline, phenytoin (Dilantin)    Patient wants to be seen    Negative: Yellowish color of the skin or white of the eye (i.e., jaundice)    Negative: Fever present > 3 days (72 hours)    Protocols used: NAUSEA-A-OH      Gallo Barnett RN, BSN, PHN

## 2021-02-27 LAB
C TRACH DNA SPEC QL NAA+PROBE: NEGATIVE
N GONORRHOEA DNA SPEC QL NAA+PROBE: NEGATIVE
SPECIMEN SOURCE: NORMAL
SPECIMEN SOURCE: NORMAL

## 2021-03-05 ENCOUNTER — MEDICAL CORRESPONDENCE (OUTPATIENT)
Dept: HEALTH INFORMATION MANAGEMENT | Facility: CLINIC | Age: 36
End: 2021-03-05

## 2021-03-09 ENCOUNTER — TELEPHONE (OUTPATIENT)
Dept: FAMILY MEDICINE | Facility: CLINIC | Age: 36
End: 2021-03-09

## 2021-03-09 ENCOUNTER — ANCILLARY PROCEDURE (OUTPATIENT)
Dept: CT IMAGING | Facility: CLINIC | Age: 36
End: 2021-03-09
Attending: NURSE PRACTITIONER
Payer: COMMERCIAL

## 2021-03-09 ENCOUNTER — ALLIED HEALTH/NURSE VISIT (OUTPATIENT)
Dept: NURSING | Facility: CLINIC | Age: 36
End: 2021-03-09
Payer: COMMERCIAL

## 2021-03-09 DIAGNOSIS — R11.2 NON-INTRACTABLE VOMITING WITH NAUSEA, UNSPECIFIED VOMITING TYPE: ICD-10-CM

## 2021-03-09 DIAGNOSIS — R10.84 ABDOMINAL PAIN, GENERALIZED: ICD-10-CM

## 2021-03-09 DIAGNOSIS — Z01.818 PREOP EXAMINATION: Primary | ICD-10-CM

## 2021-03-09 DIAGNOSIS — R11.2 NON-INTRACTABLE VOMITING WITH NAUSEA, UNSPECIFIED VOMITING TYPE: Primary | ICD-10-CM

## 2021-03-09 DIAGNOSIS — Z30.42 ENCOUNTER FOR SURVEILLANCE OF INJECTABLE CONTRACEPTIVE: Primary | ICD-10-CM

## 2021-03-09 DIAGNOSIS — R93.89 ABNORMAL FINDING ON CT SCAN: ICD-10-CM

## 2021-03-09 LAB
ALBUMIN SERPL-MCNC: 4.2 G/DL (ref 3.4–5)
ALBUMIN UR-MCNC: NEGATIVE MG/DL
ALP SERPL-CCNC: 56 U/L (ref 40–150)
ALT SERPL W P-5'-P-CCNC: 25 U/L (ref 0–50)
ANION GAP SERPL CALCULATED.3IONS-SCNC: 3 MMOL/L (ref 3–14)
ANISOCYTOSIS BLD QL SMEAR: SLIGHT
APPEARANCE UR: ABNORMAL
AST SERPL W P-5'-P-CCNC: 14 U/L (ref 0–45)
B-HCG SERPL-ACNC: <1 IU/L (ref 0–5)
BACTERIA #/AREA URNS HPF: ABNORMAL /HPF
BILIRUB SERPL-MCNC: 0.4 MG/DL (ref 0.2–1.3)
BILIRUB UR QL STRIP: NEGATIVE
BUN SERPL-MCNC: 5 MG/DL (ref 7–30)
CALCIUM SERPL-MCNC: 9.3 MG/DL (ref 8.5–10.1)
CHLORIDE SERPL-SCNC: 107 MMOL/L (ref 94–109)
CO2 SERPL-SCNC: 29 MMOL/L (ref 20–32)
COLOR UR AUTO: YELLOW
CREAT SERPL-MCNC: 0.7 MG/DL (ref 0.52–1.04)
DIFFERENTIAL METHOD BLD: NORMAL
EOSINOPHIL # BLD AUTO: 0 10E9/L (ref 0–0.7)
EOSINOPHIL NFR BLD AUTO: 1 %
ERYTHROCYTE [DISTWIDTH] IN BLOOD BY AUTOMATED COUNT: 11.9 % (ref 10–15)
GFR SERPL CREATININE-BSD FRML MDRD: >90 ML/MIN/{1.73_M2}
GLUCOSE SERPL-MCNC: 98 MG/DL (ref 70–99)
GLUCOSE UR STRIP-MCNC: NEGATIVE MG/DL
HCT VFR BLD AUTO: 40.3 % (ref 35–47)
HGB BLD-MCNC: 13.6 G/DL (ref 11.7–15.7)
HGB UR QL STRIP: NEGATIVE
KETONES UR STRIP-MCNC: NEGATIVE MG/DL
LEUKOCYTE ESTERASE UR QL STRIP: NEGATIVE
LYMPHOCYTES # BLD AUTO: 2.7 10E9/L (ref 0.8–5.3)
LYMPHOCYTES NFR BLD AUTO: 56 %
MCH RBC QN AUTO: 31.6 PG (ref 26.5–33)
MCHC RBC AUTO-ENTMCNC: 33.7 G/DL (ref 31.5–36.5)
MCV RBC AUTO: 94 FL (ref 78–100)
MONOCYTES # BLD AUTO: 0.2 10E9/L (ref 0–1.3)
MONOCYTES NFR BLD AUTO: 5 %
NEUTROPHILS # BLD AUTO: 1.8 10E9/L (ref 1.6–8.3)
NEUTROPHILS NFR BLD AUTO: 38 %
NITRATE UR QL: NEGATIVE
NON-SQ EPI CELLS #/AREA URNS LPF: ABNORMAL /LPF
OVALOCYTES BLD QL SMEAR: SLIGHT
PH UR STRIP: 7.5 PH (ref 5–7)
PLATELET # BLD AUTO: 261 10E9/L (ref 150–450)
PLATELET # BLD EST: NORMAL 10*3/UL
POIKILOCYTOSIS BLD QL SMEAR: SLIGHT
POTASSIUM SERPL-SCNC: 3.6 MMOL/L (ref 3.4–5.3)
PROT SERPL-MCNC: 8.1 G/DL (ref 6.8–8.8)
RBC # BLD AUTO: 4.3 10E12/L (ref 3.8–5.2)
RBC #/AREA URNS AUTO: ABNORMAL /HPF
SMUDGE CELLS BLD QL SMEAR: PRESENT
SODIUM SERPL-SCNC: 139 MMOL/L (ref 133–144)
SOURCE: ABNORMAL
SP GR UR STRIP: <=1.005 (ref 1–1.03)
UROBILINOGEN UR STRIP-ACNC: 0.2 EU/DL (ref 0.2–1)
VARIANT LYMPHS BLD QL SMEAR: PRESENT
WBC # BLD AUTO: 4.7 10E9/L (ref 4–11)
WBC #/AREA URNS AUTO: ABNORMAL /HPF

## 2021-03-09 PROCEDURE — 85025 COMPLETE CBC W/AUTO DIFF WBC: CPT

## 2021-03-09 PROCEDURE — 96372 THER/PROPH/DIAG INJ SC/IM: CPT

## 2021-03-09 PROCEDURE — 80053 COMPREHEN METABOLIC PANEL: CPT

## 2021-03-09 PROCEDURE — 85610 PROTHROMBIN TIME: CPT

## 2021-03-09 PROCEDURE — 81001 URINALYSIS AUTO W/SCOPE: CPT

## 2021-03-09 PROCEDURE — 87389 HIV-1 AG W/HIV-1&-2 AB AG IA: CPT

## 2021-03-09 PROCEDURE — 36415 COLL VENOUS BLD VENIPUNCTURE: CPT

## 2021-03-09 PROCEDURE — 84702 CHORIONIC GONADOTROPIN TEST: CPT

## 2021-03-09 PROCEDURE — 85730 THROMBOPLASTIN TIME PARTIAL: CPT

## 2021-03-09 PROCEDURE — 74177 CT ABD & PELVIS W/CONTRAST: CPT | Performed by: RADIOLOGY

## 2021-03-09 PROCEDURE — 99207 PR NO CHARGE NURSE ONLY: CPT

## 2021-03-09 RX ORDER — IOPAMIDOL 755 MG/ML
89 INJECTION, SOLUTION INTRAVASCULAR ONCE
Status: COMPLETED | OUTPATIENT
Start: 2021-03-09 | End: 2021-03-09

## 2021-03-09 RX ADMIN — MEDROXYPROGESTERONE ACETATE 150 MG: 150 INJECTION, SUSPENSION INTRAMUSCULAR at 15:12

## 2021-03-09 RX ADMIN — IOPAMIDOL 89 ML: 755 INJECTION, SOLUTION INTRAVASCULAR at 11:32

## 2021-03-09 NOTE — TELEPHONE ENCOUNTER
Review chart and patient needs a Pre-op physical to complete forms. Called and spoke to the patient and she will schedule. I will hold on to form and wait to see when that is and with whom.  Shamika Dalton MA  [facility]  2nd Floor  Primary Care

## 2021-03-09 NOTE — TELEPHONE ENCOUNTER
.What type of form? Work  What day did you drop off your forms?   Is there a due date? 03/ (7-10 business day to compete forms)   How would you like to receive these forms? Please fax to [phone] and also would like forms mail   Which clinic was the form dropped off at? [facility]    What is the best number to contact you? Home [phone]  What time works best to contact you with in 4 hrs? smy  Is it okay to leave a message? Yes    Stacy Parekh

## 2021-03-09 NOTE — TELEPHONE ENCOUNTER
Pre-op Physical is with Nayan on 3/17/2021. Placed form in Anum's red folder for the appointment.  Shamika Dalton [facility]  2nd Floor  Primary Care

## 2021-03-09 NOTE — NURSING NOTE
Clinic Administered Medication Documentation      Depo Provera Documentation    URINE HCG: not indicated    Depo-Provera Standing Order inclusion/exclusion criteria reviewed.   Patient meets: inclusion criteria     BP: Data Unavailable  LAST PAP/EXAM:   Lab Results   Component Value Date    PAP NIL 01/11/2021       Prior to injection, verified patient identity using patient's name and date of birth. Medication was administered. Please see MAR and medication order for additional information.     Was entire vial of medication used? Yes  Vial/Syringe: Single dose vial  Expiration Date:  09/2022    Patient instructed to stay in clinic after the injection but patient declined.  NEXT INJECTION DUE: 5/25/21 - 6/8/21    Paulie Grijalva CMA

## 2021-03-10 ENCOUNTER — OFFICE VISIT (OUTPATIENT)
Dept: URGENT CARE | Facility: URGENT CARE | Age: 36
End: 2021-03-10
Attending: NURSE PRACTITIONER
Payer: COMMERCIAL

## 2021-03-10 DIAGNOSIS — R93.89 ABNORMAL FINDING ON CT SCAN: ICD-10-CM

## 2021-03-10 DIAGNOSIS — R10.84 ABDOMINAL PAIN, GENERALIZED: ICD-10-CM

## 2021-03-10 DIAGNOSIS — R11.2 NON-INTRACTABLE VOMITING WITH NAUSEA, UNSPECIFIED VOMITING TYPE: ICD-10-CM

## 2021-03-10 LAB
APTT PPP: 28 SEC (ref 22–37)
HIV 1+2 AB+HIV1 P24 AG SERPL QL IA: NONREACTIVE
INR PPP: 1.03 (ref 0.86–1.14)
SARS-COV-2 RNA RESP QL NAA+PROBE: NORMAL
SPECIMEN SOURCE: NORMAL

## 2021-03-10 PROCEDURE — U0003 INFECTIOUS AGENT DETECTION BY NUCLEIC ACID (DNA OR RNA); SEVERE ACUTE RESPIRATORY SYNDROME CORONAVIRUS 2 (SARS-COV-2) (CORONAVIRUS DISEASE [COVID-19]), AMPLIFIED PROBE TECHNIQUE, MAKING USE OF HIGH THROUGHPUT TECHNOLOGIES AS DESCRIBED BY CMS-2020-01-R: HCPCS | Performed by: NURSE PRACTITIONER

## 2021-03-10 PROCEDURE — U0005 INFEC AGEN DETEC AMPLI PROBE: HCPCS | Performed by: NURSE PRACTITIONER

## 2021-03-11 ENCOUNTER — TELEPHONE (OUTPATIENT)
Dept: URGENT CARE | Facility: URGENT CARE | Age: 36
End: 2021-03-11

## 2021-03-11 LAB
LABORATORY COMMENT REPORT: ABNORMAL
SARS-COV-2 RNA RESP QL NAA+PROBE: POSITIVE
SPECIMEN SOURCE: ABNORMAL

## 2021-03-11 NOTE — TELEPHONE ENCOUNTER
Coronavirus (COVID-19) Notification    Reason for call  Notify of POSITIVE  COVID-19 lab result, assess symptoms,  review [facility] recommendations    Lab Result   Lab test for 2019-nCoV rRt-PCR or SARS-COV-2 PCR  Oropharyngeal AND/OR nasopharyngeal swabs were POSITIVE for 2019-nCoV RNA [OR] SARS-COV-2 RNA (COVID-19) RNA     We have been unable to reach Patient by phone at this time to notify of their Positive COVID-19 result.  Left voicemail message requesting a call back to [phone] [facility] for results.        POSITIVE COVID-19 Letter sent.    Maria Fernanda Mcfarland LPN

## 2021-03-17 ENCOUNTER — TELEPHONE (OUTPATIENT)
Dept: FAMILY MEDICINE | Facility: CLINIC | Age: 36
End: 2021-03-17

## 2021-03-17 NOTE — TELEPHONE ENCOUNTER
Recommend patient reschedule this preop appointment since she recently tested positive for COVID.  Ok to come back to clinic on 3/24 based on quarantine guidelines.  Please notify and reschedule.  Thanks!  Anum

## 2021-03-17 NOTE — TELEPHONE ENCOUNTER
Patient is scheduled for 3/24/2021 at 10:00 am.  Shamika Dalton [facility]  2nd Floor  Primary Care

## 2021-03-19 NOTE — TELEPHONE ENCOUNTER
Patient wanted her labs that was done on 3/9/21 faxed to Sheryl Goodman and Dr. Gaming at fax # [phone].  Kali Guillory,  For 1st Floor Primary Care

## 2021-03-24 ENCOUNTER — OFFICE VISIT (OUTPATIENT)
Dept: FAMILY MEDICINE | Facility: CLINIC | Age: 36
End: 2021-03-24
Payer: COMMERCIAL

## 2021-03-24 VITALS
BODY MASS INDEX: 21.82 KG/M2 | RESPIRATION RATE: 16 BRPM | TEMPERATURE: 97.2 F | SYSTOLIC BLOOD PRESSURE: 133 MMHG | OXYGEN SATURATION: 96 % | DIASTOLIC BLOOD PRESSURE: 82 MMHG | WEIGHT: 139 LBS | HEIGHT: 67 IN | HEART RATE: 74 BPM

## 2021-03-24 DIAGNOSIS — Z41.1 ENCOUNTER FOR COSMETIC SURGERY: ICD-10-CM

## 2021-03-24 DIAGNOSIS — L03.012 CELLULITIS OF LEFT INDEX FINGER: ICD-10-CM

## 2021-03-24 DIAGNOSIS — I07.1 TRICUSPID VALVE INSUFFICIENCY, UNSPECIFIED ETIOLOGY: ICD-10-CM

## 2021-03-24 DIAGNOSIS — Z86.16 HISTORY OF 2019 NOVEL CORONAVIRUS DISEASE (COVID-19): ICD-10-CM

## 2021-03-24 DIAGNOSIS — Z01.818 PREOP GENERAL PHYSICAL EXAM: Primary | ICD-10-CM

## 2021-03-24 DIAGNOSIS — I34.0 MITRAL VALVE INSUFFICIENCY, UNSPECIFIED ETIOLOGY: ICD-10-CM

## 2021-03-24 LAB
ALBUMIN UR-MCNC: NEGATIVE MG/DL
APPEARANCE UR: CLEAR
BILIRUB UR QL STRIP: NEGATIVE
COLOR UR AUTO: YELLOW
GLUCOSE UR STRIP-MCNC: NEGATIVE MG/DL
HGB UR QL STRIP: NEGATIVE
KETONES UR STRIP-MCNC: NEGATIVE MG/DL
LEUKOCYTE ESTERASE UR QL STRIP: NEGATIVE
NITRATE UR QL: NEGATIVE
PH UR STRIP: 6 PH (ref 5–7)
SOURCE: NORMAL
SP GR UR STRIP: 1.01 (ref 1–1.03)
SPECIMEN SOURCE: NORMAL
UROBILINOGEN UR STRIP-ACNC: 0.2 EU/DL (ref 0.2–1)
WET PREP SPEC: NORMAL

## 2021-03-24 PROCEDURE — 93000 ELECTROCARDIOGRAM COMPLETE: CPT | Performed by: NURSE PRACTITIONER

## 2021-03-24 PROCEDURE — 99214 OFFICE O/P EST MOD 30 MIN: CPT | Performed by: NURSE PRACTITIONER

## 2021-03-24 PROCEDURE — 81003 URINALYSIS AUTO W/O SCOPE: CPT | Performed by: NURSE PRACTITIONER

## 2021-03-24 PROCEDURE — 87210 SMEAR WET MOUNT SALINE/INK: CPT | Performed by: NURSE PRACTITIONER

## 2021-03-24 RX ORDER — CEPHALEXIN 500 MG/1
500 CAPSULE ORAL 3 TIMES DAILY
Qty: 30 CAPSULE | Refills: 0 | Status: SHIPPED | OUTPATIENT
Start: 2021-03-24 | End: 2021-04-03

## 2021-03-24 ASSESSMENT — PAIN SCALES - GENERAL: PAINLEVEL: NO PAIN (0)

## 2021-03-24 ASSESSMENT — MIFFLIN-ST. JEOR: SCORE: 1368.13

## 2021-03-24 NOTE — Clinical Note
Please fax to [phone].  Will need to attach EKG AND form from surgery office which I will place in TC basket.  Labs already including in note.  Thanks!  Anum

## 2021-03-24 NOTE — PROGRESS NOTES
[address] [address]  Phone: [phone]  Primary Provider: Lisa Montana  Pre-op Performing Provider: GARCIA BURGOS      PREOPERATIVE EVALUATION:  Today's date: 3/24/2021    Cruz Obregon is a 35 year old female who presents for a preoperative evaluation.    Surgical Information:  Surgery/Procedure: BREAST AUGMENTATION   Surgery Location: [facility]  Surgeon: Dr. Jorge Alberto Gaming  Surgery Date: 5/7/2021  Time of Surgery: TBD  Where patient plans to recover: At home with family and Other: Hotel  Fax number for surgical facility: [phone]    Type of Anesthesia Anticipated: General    Assessment & Plan     The proposed surgical procedure is considered INTERMEDIATE risk.    Preop general physical exam  - EKG 12-lead complete w/read - Clinics  - *UA reflex to Microscopic and Culture ([facility] (except [place] and [place])  - Wet prep    Encounter for cosmetic surgery  Desire for breast augmentation    Mitral valve insufficiency, unspecified etiology  Last follow up with cardiology was October 2020.  Echo remains stable.  Patient remains asymptomatic.  Exam normal.  EKG benign.    Tricuspid valve insufficiency, unspecified etiology  As above.     History of 2019 novel coronavirus disease (COVID-19)  Tested positive 3/10/21.  No residual symptoms currently.  Denies headache, chest pain, dizziness, shortness of breath, cough, weakness, or vision changes.      Cellulitis of left index finger  Improving with warm soaks.  Continue these three times a day.  If does not continue to resole or if worsens, start below.    - cephALEXin (KEFLEX) 500 MG capsule; Take 1 capsule (500 mg) by mouth 3 times daily for 10 days       Risks and Recommendations:  The patient has the following additional risks and recommendations for perioperative complications:   - No identified additional risk factors  other than previously addressed    Medication Instructions:  Patient is on no chronic medications    RECOMMENDATION:  APPROVAL GIVEN to proceed with proposed procedure, without further diagnostic evaluation.        20 minutes spent on the date of the encounter doing chart review, history and exam, documentation and further activities as noted above        Subjective     HPI related to upcoming procedure: desire for breast augmentation.    Preop Questions 3/24/2021   1. Have you ever had a heart attack or stroke? No   2. Have you ever had surgery on your heart or blood vessels, such as a stent placement, a coronary artery bypass, or surgery on an artery in your head, neck, heart, or legs? No   3. Do you have chest pain with activity? No   4. Do you have a history of  heart failure? No   5. Do you currently have a cold, bronchitis or symptoms of other infection? No   6. Do you have a cough, shortness of breath, or wheezing? No   7. Do you or anyone in your family have previous history of blood clots? No   8. Do you or does anyone in your family have a serious bleeding problem such as prolonged bleeding following surgeries or cuts? No   9. Have you ever had problems with anemia or been told to take iron pills? No   10. Have you had any abnormal blood loss such as black, tarry or bloody stools, or abnormal vaginal bleeding? No   11. Have you ever had a blood transfusion? No   12. Are you willing to have a blood transfusion if it is medically needed before, during, or after your surgery? Yes   13. Have you or any of your relatives ever had problems with anesthesia? No   14. Do you have sleep apnea, excessive snoring or daytime drowsiness? No   15. Do you have any artifical heart valves or other implanted medical devices like a pacemaker, defibrillator, or continuous glucose monitor? No   16. Do you have artificial joints? No   17. Are you allergic to latex? No   18. Is there any chance that you may be pregnant? No        Health Care Directive:  Patient does not have a Health Care Directive or Living Will: Discussed advance care planning with patient; information given to patient to review.    Preoperative Review of :   reviewed - no record of controlled substances prescribed.      Status of Chronic Conditions:  See problem list for active medical problems.  Problems all longstanding and stable, except as noted/documented.  See ROS for pertinent symptoms related to these conditions.      Review of Systems  CONSTITUTIONAL: NEGATIVE for fever, chills, change in weight  INTEGUMENTARY/SKIN: NEGATIVE for worrisome rashes, moles or lesions  EYES: NEGATIVE for vision changes or irritation  ENT/MOUTH: NEGATIVE for ear, mouth and throat problems  RESP: NEGATIVE for significant cough or SOB  BREAST: NEGATIVE for masses, tenderness or discharge  CV: NEGATIVE for chest pain, palpitations or peripheral edema  GI: NEGATIVE for nausea, abdominal pain, heartburn, or change in bowel habits  : NEGATIVE for frequency, dysuria, or hematuria  MUSCULOSKELETAL: NEGATIVE for significant arthralgias or myalgia  NEURO: NEGATIVE for weakness, dizziness or paresthesias  ENDOCRINE: NEGATIVE for temperature intolerance, skin/hair changes  HEME: NEGATIVE for bleeding problems  PSYCHIATRIC: NEGATIVE for changes in mood or affect    Patient Active Problem List    Diagnosis Date Noted     History of 2019 novel coronavirus disease (COVID-19) 03/24/2021     Priority: Medium     Esophageal reflux (GERD) 07/07/2014     Priority: Medium     Low back pain 02/18/2014     Priority: Medium     Diagnosis updated by automated process. Provider to review and confirm.       Chest pain 08/13/2013     Priority: Medium     Imo Update utility       Frequent menstruation 10/22/2012     Priority: Medium     Cervical high risk HPV (human papillomavirus) test positive 01/09/2012     Priority: Medium     11/17/10 pap- ASCUS + LR HPV (54). Plan-- per protocol, repeat  screening pap in one year  1/4/12 pap ASCUS, + HR HPV (66) and LR HPV ( 6, 72). Plan-- colposcopy within 3 months.  1/30/12 colposcopy. bx- inflammation. Plan-- pap in 6 months. (due 7/30/12)   9/10/12 pap ASCUS neg HPV. Plan-- Follow up in 3 months if still mild changes.(due 12/10/12)   12/12/12 pap NIL, + HR HPV (52). Plan- colposcopy  1/14/13 colposcopy scheduled. Cancelled  1/17/13 scheduled/cancelled  2/26/13 colposcopy.no visible pathology/no bx taken.  Plan-- repeat pap in 6 months. (due 8/26/13)   9/23/13 pap NIL. Plan-- repeat pap/HPV on or about 2/26/14 (one year from colp)  2/18/14 pap NIL/neg HPV. Plan: pap in 3 years.  4/24/17 NIL pap/neg HR HPV. Routine screening.  1/11/21 NIL pap, + HR HPV (not 16 or 18). Plan cotest in 1 year due bef 1/11/22.  1/18/21 Result letter sent to pt via [employer].   1/22/21 Pt read result letter.          Paresthesias, right hand 10/27/2011     Priority: Medium     Chronic daily headache 10/27/2011     Priority: Medium     Tension headache 08/09/2011     Priority: Medium     (Problem list name updated by automated process. Provider to review and confirm.)       Hypersomnia 08/09/2011     Priority: Medium     Joint pain 08/09/2011     Priority: Medium     Elevated antinuclear antibody (CAMILO) level 05/09/2011     Priority: Medium     Workup by rheum in 2011 revealed no autoimmune diagnosis.       Bacterial vaginosis 02/23/2011     Priority: Medium     Mild Mitral insufficiency 01/04/2011     Priority: Medium     Mild Tricuspid insufficiency 01/04/2011     Priority: Medium     CARDIOVASCULAR SCREENING; LDL GOAL LESS THAN 160 10/31/2010     Priority: Medium      Past Medical History:   Diagnosis Date     Abnormal Pap smear of cervix 11/17/2010 2010, 2012     Cervical high risk HPV (human papillomavirus) test positive     2012, 2021     Chronic daily headache 10/27/2011     H/O colposcopy with cervical biopsy 01/30/2012 2012, 2013     Heart disease     Heart Murmur     Heart  "murmur     Mitral regurg     Mild Mitral insufficiency 01/04/2011     Mild Tricuspid insufficiency 01/04/2011     Narcolepsy     Diagnosed by sleep specialist based In [place]     Oligohydramnios - delivered 2009    induced @ 37 weeks     Unspecified hemorrhoids without mention of complication      Past Surgical History:   Procedure Laterality Date     NO HISTORY OF SURGERY       Current Outpatient Medications   Medication Sig Dispense Refill     medroxyPROGESTERone (DEPO-PROVERA) 150 MG/ML IM injection Inject 150 mg into the muscle every 3 months       zolpidem (AMBIEN) 5 MG tablet Take 1 tablet (5 mg) by mouth nightly as needed for sleep 30 tablet 3       Allergies   Allergen Reactions     Sulfa Drugs Rash     Sulfamethoxazole Rash        Social History     Tobacco Use     Smoking status: Never Smoker     Smokeless tobacco: Never Used   Substance Use Topics     Alcohol use: No     Alcohol/week: 0.0 standard drinks     Family History   Problem Relation Age of Onset     Asthma Mother      Alcohol/Drug Mother      Allergies Mother      Hypertension Father      Gastrointestinal Disease Father      Alcohol/Drug Father      Lipids Father      Hearing Loss Father      Neurologic Disorder Father         migraine     Diabetes Maternal Grandmother      Cerebrovascular Disease Maternal Grandmother      Cancer Paternal Grandfather      Asthma Sister      Alzheimer Disease Maternal Grandfather      Cardiovascular Other      Neurologic Disorder Paternal Grandmother         migraine     Thyroid Disease No family hx of      Glaucoma No family hx of      Macular Degeneration No family hx of      History   Drug Use No         Objective     /82 (BP Location: Left arm, Patient Position: Sitting, Cuff Size: Adult Regular)   Pulse 74   Temp 97.2  F (36.2  C) (Tympanic)   Resp 16   Ht 1.702 m (5' 7\")   Wt 63 kg (139 lb)   LMP  (LMP Unknown)   SpO2 96%   BMI 21.77 kg/m      Physical Exam    GENERAL APPEARANCE: healthy, alert " and no distress     EYES: EOMI, PERRL     HENT: ear canals and TM's normal and nose and mouth without ulcers or lesions     NECK: no adenopathy, no asymmetry, masses, or scars and thyroid normal to palpation     RESP: lungs clear to auscultation - no rales, rhonchi or wheezes     CV: regular rates and rhythm, normal S1 S2, no S3 or S4 and no murmur, click or rub     ABDOMEN:  soft, nontender, no HSM or masses and bowel sounds normal     MS: extremities normal- no gross deformities noted, no evidence of inflammation in joints, FROM in all extremities.     SKIN: right pinky finger DIP slightly swollen and erythematous. No drainage or tenderness.     NEURO: Normal strength and tone, sensory exam grossly normal, mentation intact and speech normal     PSYCH: mentation appears normal. and affect normal/bright     LYMPHATICS: No cervical adenopathy    Recent Labs   Lab Test 03/09/21  1447 02/26/21  1100   HGB 13.6 14.3    250   INR 1.03  --     139   POTASSIUM 3.6 3.9   CR 0.70 0.74        Diagnostics:  Recent Results (from the past 24 hour(s))   *UA reflex to Microscopic and Culture ([place] [facility] (except [place] and [place])    Collection Time: 03/24/21 10:54 AM    Specimen: Midstream Urine   Result Value Ref Range    Color Urine Yellow     Appearance Urine Clear     Glucose Urine Negative NEG^Negative mg/dL    Bilirubin Urine Negative NEG^Negative    Ketones Urine Negative NEG^Negative mg/dL    Specific Gravity Urine 1.010 1.003 - 1.035    Blood Urine Negative NEG^Negative    pH Urine 6.0 5.0 - 7.0 pH    Protein Albumin Urine Negative NEG^Negative mg/dL    Urobilinogen Urine 0.2 0.2 - 1.0 EU/dL    Nitrite Urine Negative NEG^Negative    Leukocyte Esterase Urine Negative NEG^Negative    Source Midstream Urine    Wet prep    Collection Time: 03/24/21 10:54 AM    Specimen: Vagina   Result Value Ref Range    Specimen Description Vagina     Wet Prep WBC'S seen  Rare       Wet Prep No Trichomonas  seen     Wet Prep No clue cells seen     Wet Prep No yeast seen       EKG: appears normal, NSR, normal axis, normal intervals, no acute ST/T changes c/w ischemia, no LVH by voltage criteria, unchanged from previous tracings    Revised Cardiac Risk Index (RCRI):  The patient has the following serious cardiovascular risks for perioperative complications:   - No serious cardiac risks = 0 points     RCRI Interpretation: 1 point: Class II (low risk - 0.9% complication rate)           Signed Electronically by: FIONA Diallo CNP  Copy of this evaluation report is provided to requesting physician.

## 2021-03-24 NOTE — PROGRESS NOTES
Faxed form, Ekg and pre-op notes to Didi Richardson, [phone], right fax confirmed at 11:40 am today, 3/24/2021. Mailing all this to patient's home address, this will go out in tomorrow's mail. Copy of form to TC and abstracting.  Shamika Dalton [facility]  2nd Floor  Primary Care

## 2021-03-24 NOTE — PATIENT INSTRUCTIONS
Patient Instructions     Please avoid fish oil, aspirin,  ibuprofen, motrin, advil, aleve, or naproxen 7 days prior to surgery.  Can take Tylenol.    Ok to take all prescribed medications prior to surgery unless otherwise discussed at your visit.    If you develop fever >100.4 or cough come in so we can possibly treat it prior to your surgery.  If not, we may need to delay your surgery.    Preparing for Your Surgery  Getting started  A nurse will call you to review your health history and instructions. They will give you an arrival time based on your scheduled surgery time.  Please be ready to share the following:    Your doctor's clinic name and phone number    Your medical, surgical and anesthesia history    A list of allergies and sensitivities    A list of medicines, including herbal treatments and over-the-counter drugs    Whether the patient has a legal guardian (ask how to send us the papers in advance)  If you have a child who's having surgery, please ask for a copy of Preparing for Your Child's Surgery.    Preparing for surgery    Within 30 days of surgery: Have a pre-op exam (sometimes called an H&P, or History and Physical). This can be done at a clinic or pre-operative center.  ? If you're having a , you may not need this exam. Talk to your care team    At your pre-op exam, talk to your care team about all medicines you take. If you need to stop any medicines before surgery, ask when to start taking them again.  ? We do this for your safety. Many medicines can make you bleed too much during surgery. Some change how well surgery (anesthesia) drugs work.    Call your insurance company to let them know you're having surgery. (If you don't have insurance, call [phone].)    Call your clinic if there's any change in your health. This includes signs of a cold or flu (sore throat, runny nose, cough, rash, fever). It also includes a scrape or scratch near the surgery site.    If you have questions  on the day of surgery, call your hospital or surgery center.  Eating and drinking guidelines  For your safety: Unless your surgeon tells you otherwise, follow the guidelines below.    Eat and drink as usual until 8 hours before surgery. After that, no food or milk.    Drink clear liquids until 2 hours before surgery. These are liquids you can see through, like water, Gatorade and Propel Water. You may also have black coffee and tea (no cream or milk).    Nothing by mouth within 2 hours of surgery. This includes gum, candy and breath mints.    If you drink, stop drinking alcohol the night before surgery.    If your care team tells you to take medicine on the morning of surgery, it's okay to take it with a sip of water.  Preventing infection    Shower or bathe the night before and morning of your surgery. Follow the instructions your clinic gave you. (If no instructions, use regular soap.)    Don't shave or clip hair near your surgery site. We'll remove the hair if needed.    Don't smoke or vape the morning of surgery. You may chew nicotine gum up to 2 hours before surgery. A nicotine patch is okay.  ? Note: Some surgeries require you to completely quit smoking and nicotine. Check with your surgeon.    Your care team will make every effort to keep you safe from infection. We will:  ? Clean our hands often with soap and water (or an alcohol-based hand rub).  ? Clean the skin at your surgery site with a special soap that kills germs.  ? Give you a special gown to keep you warm. (Cold raises the risk of infection.)  ? Wear special hair covers, masks, gowns and gloves during surgery.  ? Give antibiotic medicine, if prescribed. Not all surgeries need antibiotics.  What to bring on the day of surgery    Photo ID and insurance card    Copy of your health care directive, if you have one    Glasses and hearing aides (bring cases)  ? You can't wear contacts during surgery    Inhaler and eye drops, if you use them (tell us about  these when you arrive)    CPAP machine or breathing device, if you use them    A few personal items, if spending the night    If you have . . .  ? A pacemaker or ICD (cardiac defibrillator): Bring the ID card.  ? An implanted stimulator: Bring the remote control.  ? A legal guardian: Bring a copy of the certified (court-stamped) guardianship papers.  Please remove any jewelry, including body piercings. Leave jewelry and other valuables at home.  If you're going home the day of surgery  Important: If you don't follow the rules below, we must cancel your surgery.     Arrange for someone to drive you home after surgery. You may not drive, take a taxi or take public transportation by yourself (unless you'll have local anesthesia only).    Arrange for a responsible adult to stay with you overnight. If you don't, we may keep you in the hospital overnight, and you may need to pay the costs yourself.  Questions?   If you have any questions for your care team, list them here: _________________________________________________________________________________________________________________________________________________________________________________________________________________________________________________________________________________________________________________________  For informational purposes only. Not to replace the advice of your health care provider. Copyright   2003, 2019 [facility]. All rights reserved. Clinically reviewed by Coni Negron MD. [employer] [id number] - REV 4/20.      At [facility], we strive to deliver an exceptional experience to you, every time we see you. If you receive a survey, please complete it as we do value your feedback.  If you have [employer], you can expect to receive results automatically within 24 hours of their completion.  Your provider will send a note interpreting your results as well.   If you do not have [employer], you should  receive your results in about a week by mail.    Your care team:                            Family Medicine Internal Medicine   MD Josh Liu, MD Marcus oDty MD Katya Belousova, PAVICTORIANO Chiu, APRLEONOR Menard MD Pediatrics   Antonio Alexandre, ZA Spicer, RHIANNON Cota, MD Karen Wilkins APRN CNP   MD Kim Curiel MD Deborah Mielke, MD Kim Thein, APRN [facility]      Clinic hours: Monday - Thursday 7 am-6 pm; Fridays 7 am-5 pm.   Urgent care: Monday - Friday 11 am-9 pm; Saturday and Sunday 9 am-5 pm.    Clinic: [phone]       [facility]: Monday - Thursday 8 am - 7 pm; Friday 8 am - 6 pm  [facility]: [phone]     Use [email or web address] for 24/7 diagnosis and treatment of dozens of conditions.

## 2021-03-24 NOTE — PROGRESS NOTES
Please fax to [phone].  Will need to attach EKG AND form from surgery office which I will place in TC basket.  Labs already including in note.  Thanks!   Anum

## 2021-03-26 ENCOUNTER — TELEPHONE (OUTPATIENT)
Dept: FAMILY MEDICINE | Facility: CLINIC | Age: 36
End: 2021-03-26

## 2021-03-26 NOTE — TELEPHONE ENCOUNTER
Patient calling and states that [facility] Arianna has not received the Pre op information. I explained  that this was faxed on 3/24/2021 confirmed that it went through at 11:40 am. Patient upset. I explained that I can make a copy and place it at our , patient disconnected the call before I could tell her when this would be available and that a copy of all that was faxed was sent to the patient's home address.   Printed pre-op notes, labs, Ekg, copy of the form and receipt of the fax that was confirmed that it went through and bringing to the  by 3:30 pm today, 3/26/2021.  Shamika Dalton [facility]  2nd Floor  Primary Care

## 2021-03-26 NOTE — TELEPHONE ENCOUNTER
PT reports a fax was supposed to be sent and the [employer] hasnt received she would like it re-faxed.

## 2021-03-29 ENCOUNTER — TELEPHONE (OUTPATIENT)
Dept: FAMILY MEDICINE | Facility: CLINIC | Age: 36
End: 2021-03-29

## 2021-03-29 DIAGNOSIS — Z01.818 PREOP GENERAL PHYSICAL EXAM: Primary | ICD-10-CM

## 2021-03-29 NOTE — TELEPHONE ENCOUNTER
Re faxed form, pre op notes and Ekg to Didi Richardson, [phone], right fax confirmed at 9:53 am today, 3/29/2021.  Shamika Dalton [facility]  2nd Floor  Primary Care

## 2021-03-29 NOTE — TELEPHONE ENCOUNTER
Shamika Dalton MA   Medical Assistant      Telephone Encounter   Signed   Encounter Date:  3/26/2021                    []Hide copied text    []Deysi for details  Patient calling and states that Didi Keller has not received the Pre op information. I explained  that this was faxed on 3/24/2021 confirmed that it went through at 11:40 am. Patient upset. I explained that I can make a copy and place it at our , patient disconnected the call before I could tell her when this would be available and that a copy of all that was faxed was sent to the patient's home address.   Printed pre-op notes, labs, Ekg, copy of the form and receipt of the fax that was confirmed that it went through and bringing to the  by 3:30 pm today, 3/26/2021.  Shamika Dalton MA  [facility]  2nd Floor  Primary Care

## 2021-03-29 NOTE — TELEPHONE ENCOUNTER
Printed and faxed Echocardiogram from 10/13/2020 to Dr Warner and Sheryl Robles, [phone], right fax confirmed at 3:45 pm today, 3/29/2021.  Routing message to Anum to advise on the Covid order.  Shamika Dalton MA  [facility]  2nd Floor  Primary Care

## 2021-03-29 NOTE — TELEPHONE ENCOUNTER
Shamika arauz MA   Medical Assistant      Progress Notes      Signed   Encounter Date:  3/24/2021                    []Hide copied text    []Hover for details  Faxed form, Ekg and pre-op notes to Didi Landerosleonard, [phone], right fax confirmed at 11:40 am today, 3/24/2021. Mailing all this to patient's home address, this will go out in tomorrow's mail. Copy of form to TC and abstracting.  Shamika Dalton MA  [facility]  2nd Floor  Primary Care

## 2021-03-29 NOTE — TELEPHONE ENCOUNTER
Patient is calling. She had a positive covid test 3 weeks ago, she was unable to have her surgery. She needs orders for a new covid test. Also, she needs her last echocardiogram result faxed to Dr Warner or Sheryl Robles @ [phone].Floridalma Murray

## 2021-04-02 DIAGNOSIS — Z01.818 PREOP GENERAL PHYSICAL EXAM: ICD-10-CM

## 2021-04-02 LAB
SARS-COV-2 RNA RESP QL NAA+PROBE: NORMAL
SPECIMEN SOURCE: NORMAL

## 2021-04-02 PROCEDURE — U0003 INFECTIOUS AGENT DETECTION BY NUCLEIC ACID (DNA OR RNA); SEVERE ACUTE RESPIRATORY SYNDROME CORONAVIRUS 2 (SARS-COV-2) (CORONAVIRUS DISEASE [COVID-19]), AMPLIFIED PROBE TECHNIQUE, MAKING USE OF HIGH THROUGHPUT TECHNOLOGIES AS DESCRIBED BY CMS-2020-01-R: HCPCS | Performed by: NURSE PRACTITIONER

## 2021-04-02 PROCEDURE — U0005 INFEC AGEN DETEC AMPLI PROBE: HCPCS | Performed by: NURSE PRACTITIONER

## 2021-04-03 DIAGNOSIS — F51.01 PRIMARY INSOMNIA: ICD-10-CM

## 2021-04-03 LAB
LABORATORY COMMENT REPORT: NORMAL
SARS-COV-2 RNA RESP QL NAA+PROBE: NEGATIVE
SPECIMEN SOURCE: NORMAL

## 2021-04-05 RX ORDER — ZOLPIDEM TARTRATE 5 MG/1
TABLET ORAL
Qty: 30 TABLET | Refills: 3 | Status: SHIPPED | OUTPATIENT
Start: 2021-04-05 | End: 2021-07-30

## 2021-04-05 NOTE — TELEPHONE ENCOUNTER
Routing refill request to provider for review/approval because:  Drug not on the [facility] refill protocol     Ira SINGHN, RN

## 2021-05-12 ENCOUNTER — NURSE TRIAGE (OUTPATIENT)
Dept: NURSING | Facility: CLINIC | Age: 36
End: 2021-05-12

## 2021-05-13 NOTE — TELEPHONE ENCOUNTER
"Patient was seen in ED last evening.   Per Discharge notes:    On exam I see no evidence of infection. There is no bruising. I did check a white count and CRP both of which are negative. I do not think this swelling is related to infection. Most likely typical postoperative swelling. Patient should contact her surgeon. Take ibuprofen for pain. Return to the ED if she notes any signs of infection.    Called patient to see how she is doing today, and to see if she was able to contact her surgeon's office.    Patient states she woke up today and was \"perfectly fine\" the concerning symptoms were resolved and she if feeling well today.   She denies fevers/chills, new or worsening symptoms.      She states she called her surgeon's office, they knew she was trying to speak to a member of the care team but the call was disconnected. They did not call her back.  If she has difficulty or questions, she will attempt again.  She does have follow up scheduled with the surgeon in [place], did not know the date at this moment.    Pamela John RN, [facility]      "

## 2021-05-13 NOTE — TELEPHONE ENCOUNTER
Provider to review the ER report. Please address who/where patient should follow up since [place] out of [place].    Justa Rodriguez RN, [facility] Triage    RN reviewed ER report.

## 2021-06-22 ENCOUNTER — OFFICE VISIT (OUTPATIENT)
Dept: FAMILY MEDICINE | Facility: CLINIC | Age: 36
End: 2021-06-22
Payer: COMMERCIAL

## 2021-06-22 VITALS
SYSTOLIC BLOOD PRESSURE: 125 MMHG | DIASTOLIC BLOOD PRESSURE: 81 MMHG | TEMPERATURE: 98.3 F | HEART RATE: 66 BPM | WEIGHT: 139 LBS | BODY MASS INDEX: 21.82 KG/M2 | OXYGEN SATURATION: 97 % | HEIGHT: 67 IN

## 2021-06-22 DIAGNOSIS — Z30.42 ENCOUNTER FOR SURVEILLANCE OF INJECTABLE CONTRACEPTIVE: Primary | ICD-10-CM

## 2021-06-22 LAB — HCG UR QL: NEGATIVE

## 2021-06-22 PROCEDURE — 96372 THER/PROPH/DIAG INJ SC/IM: CPT | Performed by: NURSE PRACTITIONER

## 2021-06-22 PROCEDURE — 99213 OFFICE O/P EST LOW 20 MIN: CPT | Mod: 25 | Performed by: NURSE PRACTITIONER

## 2021-06-22 PROCEDURE — 81025 URINE PREGNANCY TEST: CPT | Performed by: NURSE PRACTITIONER

## 2021-06-22 RX ORDER — MEDROXYPROGESTERONE ACETATE 150 MG/ML
150 INJECTION, SUSPENSION INTRAMUSCULAR
Status: COMPLETED | OUTPATIENT
Start: 2021-06-22 | End: 2022-03-31

## 2021-06-22 RX ADMIN — MEDROXYPROGESTERONE ACETATE 150 MG: 150 INJECTION, SUSPENSION INTRAMUSCULAR at 12:36

## 2021-06-22 ASSESSMENT — PAIN SCALES - GENERAL: PAINLEVEL: NO PAIN (0)

## 2021-06-22 ASSESSMENT — MIFFLIN-ST. JEOR: SCORE: 1368.13

## 2021-06-22 NOTE — PATIENT INSTRUCTIONS
At [facility], we strive to deliver an exceptional experience to you, every time we see you. If you receive a survey, please complete it as we do value your feedback.  If you have MyChart, you can expect to receive results automatically within 24 hours of their completion.  Your provider will send a note interpreting your results as well.   If you do not have MyChart, you should receive your results in about a week by mail.    Your care team:                            Family Medicine Internal Medicine   MD Josh Liu MD Shantel Branch-Fleming, MD Srinivasa Vaka, MD Katya Belousova, PAVICTORIANO Chiu, APRN CNP    You Menard, MD Pediatrics   Antonio Alexandre, PAVICTORIANO Spicer, CNP MD Karen Renteria APRN CNP   MD Kim Curiel MD Deborah Mielke, MD Karen Yadav, APRN [facility]      Clinic hours: Monday - Thursday 7 am-6 pm; Fridays 7 am-5 pm.   Urgent care: Monday - Friday 10 am- 8 pm; Saturday and Sunday 9 am-5 pm.    Clinic: [phone]       [facility]: Monday - Thursday 8 am - 7 pm; Friday 8 am - 6 pm  [facility]: [phone]     Use [email or web address] for 24/7 diagnosis and treatment of dozens of conditions.

## 2021-06-22 NOTE — NURSING NOTE
Clinic Administered Medication Documentation    Administrations This Visit     medroxyPROGESTERone (DEPO-PROVERA) injection 150 mg     Admin Date  06/22/2021 Action  Given Dose  150 mg Route  Intramuscular Site  Right Gluteus Luiz Administered By  Lolly Garrido    Ordering Provider: Anum Spicer APRN CNP    NDC: 97728-780-84    Lot#: [id number]    : [facility]    Patient Supplied?: No                  Depo Provera Documentation    URINE HCG: negative    Depo-Provera Standing Order inclusion/exclusion criteria reviewed.   Patient meets: inclusion criteria     BP: 125/81  LAST PAP/EXAM:   Lab Results   Component Value Date    PAP NIL 01/11/2021       Prior to injection, verified patient identity using patient's name and date of birth. Medication was administered. Please see MAR and medication order for additional information.     Was entire vial of medication used? Yes  Vial/Syringe: Single dose vial  Expiration Date:  08/2022    Patient instructed to report any adverse reaction to staff immediately .  NEXT INJECTION DUE: 9/7/21 - 9/21/21      Lolly Garrido CMA

## 2021-06-22 NOTE — PROGRESS NOTES
"    Assessment & Plan     Encounter for surveillance of injectable contraceptive  Uses for suppression of menses.  Works well for this.  Has essure for permanent contraception.   - HCG Qual, Urine ([id number])  - medroxyPROGESTERone (DEPO-PROVERA) injection 150 mg    Prescription drug management  10 minutes spent on the date of the encounter doing chart review, history and exam, documentation and further activities per the note            Return in about 6 months (around 12/22/2021) for Routine preventive.    FIONA Diallo CNP  M [facility] is a 35 year old who presents for the following health issues     HPI     Missed depo window.  Uses for menses suppression; has essure for contraception      Review of Systems   Constitutional, HEENT, cardiovascular, pulmonary, gi and gu systems are negative, except as otherwise noted.      Objective    /81 (BP Location: Left arm, Patient Position: Chair, Cuff Size: Adult Regular)   Pulse 66   Temp 98.3  F (36.8  C) (Oral)   Ht 1.702 m (5' 7\")   Wt 63 kg (139 lb)   SpO2 97%   BMI 21.77 kg/m    Body mass index is 21.77 kg/m .  Physical Exam   GENERAL: healthy, alert and no distress  PSYCH: mentation appears normal, affect normal/bright    Results for orders placed or performed in visit on 06/22/21 (from the past 24 hour(s))   HCG Qual, Urine ([id number])   Result Value Ref Range    HCG Qual Urine Negative NEG^Negative               "

## 2021-06-25 NOTE — MR AVS SNAPSHOT
After Visit Summary   7/12/2017    Cruz Obregon    MRN: [id number]           Patient Information     Date Of Birth          1985        Visit Information        Provider Department      7/12/2017 2:00 PM [facility]        Today's Diagnoses     Encounter for surveillance of injectable contraceptive    -  1       Follow-ups after your visit        Who to contact     If you have questions or need follow up information about today's clinic visit or your schedule please contact [facility] directly at [phone].  Normal or non-critical lab and imaging results will be communicated to you by MyChart, letter or phone within 4 business days after the clinic has received the results. If you do not hear from us within 7 days, please contact the clinic through iRiset or phone. If you have a critical or abnormal lab result, we will notify you by phone as soon as possible.  Submit refill requests through ICEX or call your pharmacy and they will forward the refill request to us. Please allow 3 business days for your refill to be completed.          Additional Information About Your Visit        MyChart Information     ICEX gives you secure access to your electronic health record. If you see a primary care provider, you can also send messages to your care team and make appointments. If you have questions, please call your primary care clinic.  If you do not have a primary care provider, please call [phone] and they will assist you.        Care EveryWhere ID     This is your Care EveryWhere ID. This could be used by other organizations to access your [place] medical records  [id number]        Your Vitals Were     Pulse BMI (Body Mass Index)                68 18.74 kg/m2           Blood Pressure from Last 3 Encounters:   07/12/17 114/68   04/24/17 101/63   12/19/16 128/66    Weight from Last 3 Encounters:   07/12/17 117 lb 14.4 oz (53.5 kg)    04/24/17 123 lb 6.4 oz (56 kg)   12/19/16 122 lb 2 oz (55.4 kg)              We Performed the Following     INJECTION INTRAMUSCULAR OR SUB-Q     MEDROXYPROGESTERONE INJ        Primary Care Provider Office Phone # Fax #    Lisa Chaudhari Sharon Carney [phone]       [facility] [address]  [facility] [address]        Equal Access to Services     CALEB KELLER : Hadii aad ku hadasho Soomaali, waaxda luqadaha, qaybta kaalmada adeegyada, waxay idiin hayaan adeeg kharash la'aanatalie . So [facility] [phone].    ATENCIÓN: Si habla español, tiene a herring disposición servicios gratuitos de asistencia lingüística. [facility] [phone].    We comply with applicable federal civil rights laws and [place] laws. We do not discriminate on the basis of race, color, national origin, age, disability sex, sexual orientation or gender identity.            Thank you!     Thank you for choosing [facility]  for your care. Our goal is always to provide you with excellent care. Hearing back from our patients is one way we can continue to improve our services. Please take a few minutes to complete the written survey that you may receive in the mail after your visit with us. Thank you!             Your Updated Medication List - Protect others around you: Learn how to safely use, store and throw away your medicines at www.disposemymeds.org.          This list is accurate as of: 7/12/17  2:05 PM.  Always use your most recent med list.                   Brand Name Dispense Instructions for use Diagnosis    medroxyPROGESTERone 150 MG/ML injection    DEPO-PROVERA    3 mL    Inject 1 mL (150 mg) into the muscle every 3 months    Encounter for surveillance of injectable contraceptive       metroNIDAZOLE 0.75 % vaginal gel    METROGEL    70 g    Place 1 applicator (5 g) vaginally At Bedtime    Vaginal odor          3 Cold/Sinus

## 2021-07-30 DIAGNOSIS — F51.01 PRIMARY INSOMNIA: ICD-10-CM

## 2021-07-30 RX ORDER — ZOLPIDEM TARTRATE 5 MG/1
TABLET ORAL
Qty: 30 TABLET | Refills: 0 | Status: SHIPPED | OUTPATIENT
Start: 2021-07-30 | End: 2021-08-24

## 2021-07-30 NOTE — TELEPHONE ENCOUNTER
Routing refill request to provider for review/approval because:  Drug not on the [facility] refill protocol   Alyssia Flores RN

## 2021-08-23 DIAGNOSIS — F51.01 PRIMARY INSOMNIA: ICD-10-CM

## 2021-08-24 RX ORDER — ZOLPIDEM TARTRATE 5 MG/1
TABLET ORAL
Qty: 30 TABLET | Refills: 0 | Status: SHIPPED | OUTPATIENT
Start: 2021-08-24 | End: 2021-09-29

## 2021-08-26 ENCOUNTER — TELEPHONE (OUTPATIENT)
Dept: FAMILY MEDICINE | Facility: CLINIC | Age: 36
End: 2021-08-26

## 2021-08-26 NOTE — TELEPHONE ENCOUNTER
Pt is here on site.    Pt requesting a phone call when this prescription request is refilled (insomnia medication from 8/24) because last time she didn't know when it was refilled, and it sat at the pharmacy for a week before she knew it was refilled already.    Pt is currently out of medication, and SBF is on vacation and I'm not sure who is covering for her.    Thank you,  Marixa Monet, Patient Representative

## 2021-08-27 NOTE — TELEPHONE ENCOUNTER
Patient called the clinic.  She did not get a message about the resolution for this refill.  Apologized she was not informed, she is also upset about the pharmacy it went to.  She will pick it up at the pharmacy it was sent to.    Pamela John RN, [facility]

## 2021-09-19 ENCOUNTER — HEALTH MAINTENANCE LETTER (OUTPATIENT)
Age: 36
End: 2021-09-19

## 2021-09-22 ENCOUNTER — OFFICE VISIT (OUTPATIENT)
Dept: OPTOMETRY | Facility: CLINIC | Age: 36
End: 2021-09-22
Payer: COMMERCIAL

## 2021-09-22 DIAGNOSIS — Z01.00 EXAMINATION OF EYES AND VISION: Primary | ICD-10-CM

## 2021-09-22 DIAGNOSIS — H52.03 HYPEROPIA, BILATERAL: ICD-10-CM

## 2021-09-22 DIAGNOSIS — H04.123 DRY EYE SYNDROME OF BOTH EYES: ICD-10-CM

## 2021-09-22 PROCEDURE — 92014 COMPRE OPH EXAM EST PT 1/>: CPT | Performed by: OPTOMETRIST

## 2021-09-22 PROCEDURE — 92015 DETERMINE REFRACTIVE STATE: CPT | Performed by: OPTOMETRIST

## 2021-09-22 RX ORDER — POLYETHYLENE GLYCOL 400 AND PROPYLENE GLYCOL 4; 3 MG/ML; MG/ML
1 SOLUTION/ DROPS OPHTHALMIC 4 TIMES DAILY
Qty: 6 ML | Refills: 12 | Status: SHIPPED | OUTPATIENT
Start: 2021-09-22 | End: 2022-03-02

## 2021-09-22 ASSESSMENT — REFRACTION_WEARINGRX
OD_CYLINDER: SPHERE
OS_SPHERE: +1.50
OD_SPHERE: +1.50
OS_CYLINDER: SPHERE
SPECS_TYPE: SVL

## 2021-09-22 ASSESSMENT — VISUAL ACUITY
OS_CC: 20/20
CORRECTION_TYPE: GLASSES
OD_SC: 20/20
OS_SC: 20/20
METHOD: SNELLEN - LINEAR
OS_CC: 20/20
OD_CC: 20/20
OD_CC: 20/30

## 2021-09-22 ASSESSMENT — KERATOMETRY
OD_AXISANGLE2_DEGREES: 169
OS_K2POWER_DIOPTERS: 44.00
OD_AXISANGLE_DEGREES: 079
OD_K2POWER_DIOPTERS: 44.00
OS_K1POWER_DIOPTERS: 43.50
METHOD_AUTO_MANUAL: AUTOMATED
OS_AXISANGLE_DEGREES: 077
OS_AXISANGLE2_DEGREES: 167
OD_K1POWER_DIOPTERS: 43.75

## 2021-09-22 ASSESSMENT — REFRACTION_MANIFEST
OD_SPHERE: +1.50
OS_ADD: +1.25
OD_ADD: +1.25
OS_SPHERE: +1.50

## 2021-09-22 ASSESSMENT — EXTERNAL EXAM - RIGHT EYE: OD_EXAM: NORMAL

## 2021-09-22 ASSESSMENT — CUP TO DISC RATIO
OD_RATIO: 0.35
OS_RATIO: 0.35

## 2021-09-22 ASSESSMENT — SLIT LAMP EXAM - LIDS
COMMENTS: NORMAL
COMMENTS: NORMAL

## 2021-09-22 ASSESSMENT — CONF VISUAL FIELD
METHOD: COUNTING FINGERS
OS_NORMAL: 1
OD_NORMAL: 1

## 2021-09-22 ASSESSMENT — TONOMETRY
OD_IOP_MMHG: 12
OS_IOP_MMHG: 16
IOP_METHOD: TONOPEN

## 2021-09-22 ASSESSMENT — EXTERNAL EXAM - LEFT EYE: OS_EXAM: NORMAL

## 2021-09-22 NOTE — LETTER
9/22/2021         RE: Cruz Obregon  [address]  [facility] [address]        Dear Colleague,    Thank you for referring your patient, Cruz Obregon, to the [facility]. Please see a copy of my visit note below.    .od    Chief Complaint   Patient presents with     Annual Eye Exam      Accompanied by self  Last Eye Exam: 8-  Dilated Previously: Yes,pt does not want dilation today     What are you currently using to see?  glasses       Distance Vision Acuity: Satisfied with vision    Near Vision Acuity: Satisfied with vision while reading  with glasses    Eye Comfort: good/watery  Do you use eye drops? : No  Occupation or Hobbies: none    Wears single vision and bifocal    Trinidoe Browne Optometric Assistant, A.B.O.C.          Medical, surgical and family histories reviewed and updated 9/22/2021.       OBJECTIVE: See Ophthalmology exam    ASSESSMENT:    ICD-10-CM    1. Examination of eyes and vision  Z01.00 EYE EXAM (SIMPLE-NONBILLABLE)   2. Hyperopia, bilateral  H52.03 REFRACTION   3. Dry eye syndrome of both eyes  H04.123 EYE EXAM (SIMPLE-NONBILLABLE)     polyethylene glycol-propylene glycol (SYSTANE ULTRA) 0.4-0.3 % SOLN ophthalmic solution      PLAN:     Patient Instructions   Eyeglass prescription given.  Optional OTC readers when eyes get tired- +2.00-+2.50 for reading.    Be sure to take frequent breaks(every 20 minutes for 20 seconds look as far away as possible) when on the computer or your device for long periods of time and physically get away from the screen at least once an hour.    Systane Ultra 1 drop both eyes 2-4 x day.    Return in 1 year for a complete eye exam or sooner if needed.    Jarret Matson, OD           Again, thank you for allowing me to participate in the care of your patient.        Sincerely,        Jarret Matson, OD

## 2021-09-22 NOTE — PROGRESS NOTES
Chief Complaint   Patient presents with     Annual Eye Exam      Accompanied by self  Last Eye Exam: 8-  Dilated Previously: Yes,pt does not want dilation today     What are you currently using to see?  glasses       Distance Vision Acuity: Satisfied with vision    Near Vision Acuity: Satisfied with vision while reading  with glasses    Eye Comfort: good/watery  Do you use eye drops? : No  Occupation or Hobbies: none    Wears single vision and bifocal    Trinidoe Browne Optometric Assistant, A.B.O.C.          Medical, surgical and family histories reviewed and updated 9/22/2021.       OBJECTIVE: See Ophthalmology exam    ASSESSMENT:    ICD-10-CM    1. Examination of eyes and vision  Z01.00 EYE EXAM (SIMPLE-NONBILLABLE)   2. Hyperopia, bilateral  H52.03 REFRACTION   3. Dry eye syndrome of both eyes  H04.123 EYE EXAM (SIMPLE-NONBILLABLE)     polyethylene glycol-propylene glycol (SYSTANE ULTRA) 0.4-0.3 % SOLN ophthalmic solution      PLAN:     Patient Instructions   Eyeglass prescription given.  Optional OTC readers when eyes get tired- +2.00-+2.50 for reading.    Be sure to take frequent breaks(every 20 minutes for 20 seconds look as far away as possible) when on the computer or your device for long periods of time and physically get away from the screen at least once an hour.    Systane Ultra 1 drop both eyes 2-4 x day.    Return in 1 year for a complete eye exam or sooner if needed.    Jarret Matson, OD

## 2021-09-22 NOTE — PATIENT INSTRUCTIONS
Eyeglass prescription given.  Optional OTC readers when eyes get tired- +2.00-+2.50 for reading.    Be sure to take frequent breaks(every 20 minutes for 20 seconds look as far away as possible) when on the computer or your device for long periods of time and physically get away from the screen at least once an hour.    Systane Ultra 1 drop both eyes 2-4 x day.    Return in 1 year for a complete eye exam or sooner if needed.    Jarret Matson, OD    The affects of the dilating drops last for 4- 6 hours.  You will be more sensitive to light and vision will be blurry up close.  Do not drive if you do not feel comfortable.  Mydriatic sunglasses were given if needed.      Optometry Providers       Clinic Locations                                 Telephone Number   Dr. Leny Hernández   [place]  [place] [phone]     [place] [facility] Hours:                [place] [facility] Hours:       [place] [facility] Hours:   [address] [address] [facility] [facility] [address]   [address]      [address] [facility]  [place] [address]   [place] [address]    [place]dez [address]  Phone: [phone]                    Phone: [phone]     Phone: [phone]                      Monday 8:00-7:00                          Monday 8:00-7:00                          Monday 8:00-7:00              Tuesday 8:00-6:00                          Tuesday 8:00-7:00                          Tuesday 8:00-7:00              Wednesday 8:00-6:00                  Wednesday 8:00-7:00                   Wednesday 8:00-7:00      Thursday 8:00-6:00                        Thursday 8:00-7:00                         Thursday 8:00-7:00            Friday 8:00-5:00                              Friday 8:00-5:00                              Friday 8:00-5:00    [place] [facility] Hours:   [address] [facility] [address] [id number] [phone]    Monday 8:00-7:00  Tuesday 8:00-7:00  Wednesday 8:00-7:00  Thursday  8:00-7:00  Friday 8:00-5:00  Please log on to [email or web address] to order your contact lenses.  The link is found on the [facility] Care [facility] Vision Services page.  As always, Thank you for trusting us with your health care needs!

## 2021-09-25 DIAGNOSIS — F51.01 PRIMARY INSOMNIA: ICD-10-CM

## 2021-09-27 RX ORDER — ZOLPIDEM TARTRATE 5 MG/1
TABLET ORAL
Qty: 30 TABLET | Refills: 0 | OUTPATIENT
Start: 2021-09-27

## 2021-09-28 NOTE — PROGRESS NOTES
Assessment & Plan     Bruising  Plan for labs below.  Follow up accordingly.   - CBC with platelets and differential; Future  - Hepatic panel (Albumin, ALT, AST, Bili, Alk Phos, TP); Future  - INR POCT, Enter/Edit Result  - Basic metabolic panel  (Ca, Cl, CO2, Creat, Gluc, K, Na, BUN); Future  - CBC with platelets and differential  - Hepatic panel (Albumin, ALT, AST, Bili, Alk Phos, TP)  - Basic metabolic panel  (Ca, Cl, CO2, Creat, Gluc, K, Na, BUN)    Hot flashes  With significant sweating.  Plan for labs.  Consider glycopyrrolate PO if labs normal.    - TSH with free T4 reflex; Future  - Lutropin; Future  - Follicle stimulating hormone; Future  - TSH with free T4 reflex  - Lutropin  - Follicle stimulating hormone    Excessive sweating  As above.     Primary insomnia  Refilled Ambien.      Encounter for surveillance of injectable contraceptive  Depo today      Ordering of each unique test  Prescription drug management  25 minutes spent on the date of the encounter doing chart review, history and exam, documentation and further activities per the note           Return in about 1 week (around 10/6/2021) for Follow up labs.    Anum Spicer, FIONA CNP  M [facility] is a 36 year old who presents for the following health issues     HPI   Needs depo- missed it last time.  Only uses for menses suppression.  Has essure for contraception.    Bruising, wakes up with them on her legs all over.  Concerned about menopause- hot flashes all day long. Drenching sweats x a few years.  Started to happen after her last pregnancy.      Review of Systems   Constitutional, HEENT, cardiovascular, pulmonary, GI, , musculoskeletal, neuro, skin, endocrine and psych systems are negative, except as otherwise noted.      Objective    /85 (BP Location: Left arm, Patient Position: Sitting, Cuff Size: Adult Regular)   Pulse 75   Temp 99.2  F (37.3  C) (Tympanic)   Ht 1.715 m  "(5' 7.5\")   Wt 64 kg (141 lb 3.2 oz)   SpO2 98%   BMI 21.79 kg/m    Body mass index is 21.79 kg/m .  Physical Exam   GENERAL: healthy, alert and no distress  NECK: no adenopathy, no asymmetry, masses, or scars and thyroid normal to palpation  RESP: lungs clear to auscultation - no rales, rhonchi or wheezes  CV: regular rate and rhythm, normal S1 S2, no S3 or S4, no murmur, click or rub, no peripheral edema and peripheral pulses strong  ABDOMEN: soft, nontender, no hepatosplenomegaly, no masses and bowel sounds normal  MS: no gross musculoskeletal defects noted, no edema  PSYCH: mentation appears normal, affect normal/bright    Results for orders placed or performed in visit on 09/29/21 (from the past 24 hour(s))   CBC with platelets and differential    Narrative    The following orders were created for panel order CBC with platelets and differential.  Procedure                               Abnormality         Status                     ---------                               -----------         ------                     CBC with platelets and d...[[id number]]                      In process                   Please view results for these tests on the individual orders.               "

## 2021-09-28 NOTE — PATIENT INSTRUCTIONS
At [facility], we strive to deliver an exceptional experience to you, every time we see you. If you receive a survey, please complete it as we do value your feedback.  If you have MyChart, you can expect to receive results automatically within 24 hours of their completion.  Your provider will send a note interpreting your results as well.   If you do not have MyChart, you should receive your results in about a week by mail.    Your care team:                            Family Medicine Internal Medicine   MD Josh Liu MD Shantel Branch-Fleming, MD Srinivasa Vaka, MD Katya Belousova, PAVICTORIANO Chiu, APRN CNP    You Menard, MD Pediatrics   Antonio Alexandre, PAVICTORIANO Spicer, CNP MD Karen Renteria APRN CNP   MD Kim Curiel MD Deborah Mielke, MD Karen Yadav, APRN [facility]      Clinic hours: Monday - Thursday 7 am-6 pm; Fridays 7 am-5 pm.   Urgent care: Monday - Friday 10 am- 8 pm; Saturday and Sunday 9 am-5 pm.    Clinic: [phone]       [facility]: Monday - Thursday 8 am - 7 pm; Friday 8 am - 6 pm  [facility]: [phone]     Use [email or web address] for 24/7 diagnosis and treatment of dozens of conditions.

## 2021-09-29 ENCOUNTER — OFFICE VISIT (OUTPATIENT)
Dept: FAMILY MEDICINE | Facility: CLINIC | Age: 36
End: 2021-09-29
Payer: COMMERCIAL

## 2021-09-29 VITALS
BODY MASS INDEX: 21.4 KG/M2 | HEART RATE: 75 BPM | HEIGHT: 68 IN | TEMPERATURE: 99.2 F | DIASTOLIC BLOOD PRESSURE: 85 MMHG | WEIGHT: 141.2 LBS | OXYGEN SATURATION: 98 % | SYSTOLIC BLOOD PRESSURE: 128 MMHG

## 2021-09-29 DIAGNOSIS — R61 EXCESSIVE SWEATING: ICD-10-CM

## 2021-09-29 DIAGNOSIS — T14.8XXA BRUISING: Primary | ICD-10-CM

## 2021-09-29 DIAGNOSIS — R23.2 HOT FLASHES: ICD-10-CM

## 2021-09-29 DIAGNOSIS — F51.01 PRIMARY INSOMNIA: ICD-10-CM

## 2021-09-29 DIAGNOSIS — Z30.42 ENCOUNTER FOR SURVEILLANCE OF INJECTABLE CONTRACEPTIVE: ICD-10-CM

## 2021-09-29 LAB
ALBUMIN SERPL-MCNC: 4.4 G/DL (ref 3.4–5)
ALP SERPL-CCNC: 50 U/L (ref 40–150)
ALT SERPL W P-5'-P-CCNC: 39 U/L (ref 0–50)
ANION GAP SERPL CALCULATED.3IONS-SCNC: 7 MMOL/L (ref 3–14)
AST SERPL W P-5'-P-CCNC: 22 U/L (ref 0–45)
BASOPHILS # BLD AUTO: 0 10E3/UL (ref 0–0.2)
BASOPHILS NFR BLD AUTO: 1 %
BILIRUB DIRECT SERPL-MCNC: <0.1 MG/DL (ref 0–0.2)
BILIRUB SERPL-MCNC: 0.2 MG/DL (ref 0.2–1.3)
BUN SERPL-MCNC: 6 MG/DL (ref 7–30)
CALCIUM SERPL-MCNC: 9.6 MG/DL (ref 8.5–10.1)
CHLORIDE BLD-SCNC: 108 MMOL/L (ref 94–109)
CO2 SERPL-SCNC: 25 MMOL/L (ref 20–32)
CREAT SERPL-MCNC: 0.62 MG/DL (ref 0.52–1.04)
EOSINOPHIL # BLD AUTO: 0.1 10E3/UL (ref 0–0.7)
EOSINOPHIL NFR BLD AUTO: 1 %
ERYTHROCYTE [DISTWIDTH] IN BLOOD BY AUTOMATED COUNT: 12.5 % (ref 10–15)
FSH SERPL-ACNC: 4.6 IU/L
GFR SERPL CREATININE-BSD FRML MDRD: >90 ML/MIN/1.73M2
GLUCOSE BLD-MCNC: 80 MG/DL (ref 70–99)
HCT VFR BLD AUTO: 42.9 % (ref 35–47)
HGB BLD-MCNC: 14.3 G/DL (ref 11.7–15.7)
IMM GRANULOCYTES # BLD: 0 10E3/UL
IMM GRANULOCYTES NFR BLD: 1 %
LH SERPL-ACNC: 1.4 IU/L
LYMPHOCYTES # BLD AUTO: 2.4 10E3/UL (ref 0.8–5.3)
LYMPHOCYTES NFR BLD AUTO: 53 %
MCH RBC QN AUTO: 31.4 PG (ref 26.5–33)
MCHC RBC AUTO-ENTMCNC: 33.3 G/DL (ref 31.5–36.5)
MCV RBC AUTO: 94 FL (ref 78–100)
MONOCYTES # BLD AUTO: 0.3 10E3/UL (ref 0–1.3)
MONOCYTES NFR BLD AUTO: 6 %
NEUTROPHILS # BLD AUTO: 1.8 10E3/UL (ref 1.6–8.3)
NEUTROPHILS NFR BLD AUTO: 38 %
NRBC # BLD AUTO: 0 10E3/UL
NRBC BLD AUTO-RTO: 0 /100
PLATELET # BLD AUTO: 310 10E3/UL (ref 150–450)
POTASSIUM BLD-SCNC: 3.5 MMOL/L (ref 3.4–5.3)
PROT SERPL-MCNC: 8.3 G/DL (ref 6.8–8.8)
RBC # BLD AUTO: 4.55 10E6/UL (ref 3.8–5.2)
SODIUM SERPL-SCNC: 140 MMOL/L (ref 133–144)
TSH SERPL DL<=0.005 MIU/L-ACNC: 2.45 MU/L (ref 0.4–4)
WBC # BLD AUTO: 4.6 10E3/UL (ref 4–11)

## 2021-09-29 PROCEDURE — 96372 THER/PROPH/DIAG INJ SC/IM: CPT | Performed by: NURSE PRACTITIONER

## 2021-09-29 PROCEDURE — 36415 COLL VENOUS BLD VENIPUNCTURE: CPT | Performed by: NURSE PRACTITIONER

## 2021-09-29 PROCEDURE — 80050 GENERAL HEALTH PANEL: CPT | Performed by: NURSE PRACTITIONER

## 2021-09-29 PROCEDURE — 99214 OFFICE O/P EST MOD 30 MIN: CPT | Mod: 25 | Performed by: NURSE PRACTITIONER

## 2021-09-29 PROCEDURE — 83001 ASSAY OF GONADOTROPIN (FSH): CPT | Performed by: NURSE PRACTITIONER

## 2021-09-29 PROCEDURE — 83002 ASSAY OF GONADOTROPIN (LH): CPT | Performed by: NURSE PRACTITIONER

## 2021-09-29 PROCEDURE — 82248 BILIRUBIN DIRECT: CPT | Performed by: NURSE PRACTITIONER

## 2021-09-29 RX ORDER — ZOLPIDEM TARTRATE 5 MG/1
TABLET ORAL
Qty: 30 TABLET | Refills: 0 | Status: SHIPPED | OUTPATIENT
Start: 2021-09-29 | End: 2021-10-28

## 2021-09-29 RX ADMIN — MEDROXYPROGESTERONE ACETATE 150 MG: 150 INJECTION, SUSPENSION INTRAMUSCULAR at 11:43

## 2021-09-29 ASSESSMENT — MIFFLIN-ST. JEOR: SCORE: 1381.04

## 2021-09-29 ASSESSMENT — PAIN SCALES - GENERAL: PAINLEVEL: NO PAIN (0)

## 2021-09-29 NOTE — NURSING NOTE
Clinic Administered Medication Documentation    Administrations This Visit     medroxyPROGESTERone (DEPO-PROVERA) injection 150 mg     Admin Date  09/29/2021 Action  Given Dose  150 mg Route  Intramuscular Site  Right Gluteus Luiz Administered By  Lolly Garrido    Ordering Provider: Anum Spicer APRN CNP    Patient Supplied?: No                  Depo Provera Documentation    URINE HCG: not indicated    Depo-Provera Standing Order inclusion/exclusion criteria reviewed.   Patient meets: inclusion criteria     BP: 128/85  LAST PAP/EXAM:   Lab Results   Component Value Date    PAP NIL 01/11/2021       Prior to injection, verified patient identity using patient's name and date of birth. Medication was administered. Please see MAR and medication order for additional information.     Was entire vial of medication used? Yes  Vial/Syringe: Single dose vial  Expiration Date:  12/2022    Patient instructed to stay in clinic after the injection but patient declined.  NEXT INJECTION DUE: 12/15/21 - 12/29/21    Lolly Garrido [facility]

## 2021-10-15 ENCOUNTER — TELEPHONE (OUTPATIENT)
Dept: CARDIOLOGY | Facility: CLINIC | Age: 36
End: 2021-10-15

## 2021-10-15 NOTE — TELEPHONE ENCOUNTER
Called patient to schedule follow up labs and echo appointment. Patient stated she will go to her primary care clinic to get her lipid panel drawn. However, when trying to schedule her echo, it stated the appointment had  and will need a new order placed. Notified patient of issue, and advised someone will have to call her back to schedule, once new order has been placed.    Kathy Armstrong, [facility]

## 2021-10-18 DIAGNOSIS — Z13.6 CARDIOVASCULAR SCREENING; LDL GOAL LESS THAN 160: ICD-10-CM

## 2021-10-18 DIAGNOSIS — I34.0 MITRAL INSUFFICIENCY: Primary | ICD-10-CM

## 2021-10-19 ENCOUNTER — TELEPHONE (OUTPATIENT)
Dept: CARDIOLOGY | Facility: CLINIC | Age: 36
End: 2021-10-19

## 2021-10-19 NOTE — TELEPHONE ENCOUNTER
Called patient to schedule her lab and echo appointments. Patient stated she would go to her primary clinic for her blood draw. The first available echo appointment was not until 10/29.    Kathy Armstrong CMA

## 2021-10-21 ENCOUNTER — VIRTUAL VISIT (OUTPATIENT)
Dept: CARDIOLOGY | Facility: CLINIC | Age: 36
End: 2021-10-21
Payer: COMMERCIAL

## 2021-10-21 DIAGNOSIS — Z13.6 SCREENING FOR HEART DISEASE: Primary | ICD-10-CM

## 2021-10-21 PROCEDURE — 99214 OFFICE O/P EST MOD 30 MIN: CPT | Mod: 95 | Performed by: INTERNAL MEDICINE

## 2021-10-21 NOTE — PROGRESS NOTES
"Cruz Obregon is a 36 year old who is being evaluated via a billable video visit.      How would you like to obtain your AVS? MyChart  If the video visit is dropped, the invitation should be resent by: Text to cell phone: [phone]  Will anyone else be joining your video visit? Yuliana Giraldo, [facility]  Clinic Support  [facility]    [phone]    The patient has been notified of following:     \"This video visit will be conducted via a call between you and your physician/provider. We have found that certain health care needs can be provided without the need for an in-person physical exam.  This service lets us provide the care you need with a video conversation.  If a prescription is necessary we can send it directly to your pharmacy.  If lab work is needed we can place an order for that and you can then stop by our lab to have the test done at a later time.    Video visits are billed at different rates depending on your insurance coverage.  Please reach out to your insurance provider with any questions.    If during the course of the call the physician/provider feels a video visit is not appropriate, you will not be charged for this service.\"    Patient has given verbal consent for video visit? Yes    How would you like to obtain your AVS? Mail    Video-Visit Details    Type of service:  Video Visit    Video Start Time:944am    Video End Time:1004am    Total visit time including video visit, chart review, charting, coordination of care =35min    Originating Location (pt. Location):patient home      Distant Location (provider location):  home office    Platform used for Video Visit: Meena    See dictation #[id number]    [facility]#:[id number]  "

## 2021-10-21 NOTE — PROGRESS NOTES
Visit Date: 10/21/2021    2021      Lisa Carney MD  [address] [address]    Patient:  Cruz Obregon  MRN:  [id number]  :  1985      Dear Dr. Sharon Carney:     It was a pleasure participating in the care of your patient, Ms, Cruz Obregon.  As you know, she is a 36-year-old lady who I saw over a virtual video visit via [employer] for mitral valve prolapse, mitral insufficiency.    PAST MEDICAL HISTORY:      1.  Sleep disorder, possible narcolepsy.  2.  HPV.  3.  Back pain.  4.  Positive CAMILO.  5.  Bacterial vaginosis.  6.  Gastroesophageal reflux disease.    I last saw her 10/20/2020.  At that time, she was doing adequately.  She presents today for continuing care.    She has had a history of chronic musculoskeletal chest discomfort since , but she has not been bothered by this recently.  She has not been vaccinated for COVID, nor her kids, and multiple people in her family have had COVID or have been vaccinated or both.    She does not exercise.  She does, however, walk through the store for 30 minutes at a time without chest pain, gross shortness of breath or palpitations.  She denies PND, orthopnea or edema.  She denies syncope or near-syncope.    REVIEW OF SYSTEMS:  A 10-point review of systems is otherwise unremarkable.  However, she does require Ambien for sleeping.    CURRENT MEDICATIONS:  She is taking Ambien and Depo-Provera.    PHYSICAL EXAMINATION:      VITAL SIGNS:  Her last blood pressure 2021 was 128/85 with a pulse of 75.  Her weight was 141 pounds.  GENERAL:  She appears comfortable, well groomed.  PSYCHIATRIC:  She is alert and oriented x3.  EYES:  Do not appear grossly erythematous or have exudate.  RESPIRATORY:  She is breathing comfortably without gross cough.    The remainder of the comprehensive physical exam was deferred secondary to the COVID-19 pandemic and secondary to video visit  restrictions.    LABORATORY:  2021, potassium 3.5, GFR normal, TSH normal, hemoglobin normal.    Echocardiogram 10/13/2020 reveals ejection fraction of 60% to 65% with mild to moderate MR.      IMPRESSION:      Cruz is a 36-year-old lady with several active issues:    1.  Mild mitral valve prolapse secondary mitral insufficiency:      She is currently asymptomatic at a low to at most moderate level of exertion.  She does have an echo scheduled for next week, and we will be interested in reviewing results.    2.  History of chronic musculoskeletal chest discomfort since .      Fortunately, she has not had any trouble with this as of late.  Generally, she has symptoms when she takes a deep breath or puffs out her chest, but has been doing well from this standpoint.    3.  Possible sleep disorder, possible narcolepsy:      She does have trouble with sleep and will be speaking with you in the near future to possibly reestablish care with a sleep specialist.      PLAN:      1.  I will be interested in reviewing her echocardiogram report, pending next week.  If unchanged, then we will repeat an echo in 1 year with followup at that time, earlier if needed.    Once again, it was a pleasure participating in the care of your patient, Ms. Cruz Obregon.  Please feel free to contact me at any time if there are any questions regarding her care in the future.          Jose Alcazar MD    Addendum 10/29/21:    Lipids mildly elevated tchol 219, HDL 75, TG 94,     Plan:    1.  Mediterranean diet, increased daily exercise, followup as scheduled        D: 10/21/2021   T: 10/21/2021   MT: [id number]    Name:     CRUZ OBREGON  MRN:      [id number]        Account:    [id number]   :      1985           Visit Date: 10/21/2021     Document: [id number]    cc:  Lisa Carney MD

## 2021-10-27 DIAGNOSIS — F51.01 PRIMARY INSOMNIA: ICD-10-CM

## 2021-10-27 NOTE — TELEPHONE ENCOUNTER
Routing refill request to provider for review/approval because:  Drug not on the [facility] refill protocol   Missy Larkin BSN, RN

## 2021-10-27 NOTE — TELEPHONE ENCOUNTER
Reason for Call:  Medication or medication refill:    Do you use a [facility]?  Name of the pharmacy and phone number for the current request:  [facility] [place] [address] [place]    Name of the medication requested: Ambien, zolpidem 5 mg tablet    Other request: Refill request. Pt called pharmacy on Monday, but I'm not seeing anything from pharmacy. Pt has no pills remaining. Pt states she just had a visit with Nayan in September regarding the medication, and was supposed to follow up every 3-6 months.    Can we leave a detailed message on this number? YES    Phone number patient can be reached at: Home number on file [phone] (home)    Best Time: Anytime    Call taken on 10/27/2021 at 5:10 PM by Marixa Monet,   [facility]

## 2021-10-28 ENCOUNTER — ANCILLARY PROCEDURE (OUTPATIENT)
Dept: CARDIOLOGY | Facility: CLINIC | Age: 36
End: 2021-10-28
Attending: INTERNAL MEDICINE
Payer: COMMERCIAL

## 2021-10-28 ENCOUNTER — LAB (OUTPATIENT)
Dept: LAB | Facility: CLINIC | Age: 36
End: 2021-10-28
Payer: COMMERCIAL

## 2021-10-28 DIAGNOSIS — Z13.6 SCREENING FOR HEART DISEASE: ICD-10-CM

## 2021-10-28 DIAGNOSIS — I34.0 MITRAL INSUFFICIENCY: ICD-10-CM

## 2021-10-28 LAB
CHOLEST SERPL-MCNC: 219 MG/DL
FASTING STATUS PATIENT QL REPORTED: YES
HDLC SERPL-MCNC: 75 MG/DL
LDLC SERPL CALC-MCNC: 125 MG/DL
LVEF ECHO: NORMAL
NONHDLC SERPL-MCNC: 144 MG/DL
TRIGL SERPL-MCNC: 94 MG/DL

## 2021-10-28 PROCEDURE — 80061 LIPID PANEL: CPT

## 2021-10-28 PROCEDURE — 36415 COLL VENOUS BLD VENIPUNCTURE: CPT

## 2021-10-28 PROCEDURE — 93306 TTE W/DOPPLER COMPLETE: CPT | Performed by: INTERNAL MEDICINE

## 2021-10-28 RX ORDER — ZOLPIDEM TARTRATE 5 MG/1
TABLET ORAL
Qty: 30 TABLET | Refills: 0 | Status: SHIPPED | OUTPATIENT
Start: 2021-10-28 | End: 2021-12-03

## 2021-12-03 DIAGNOSIS — F51.01 PRIMARY INSOMNIA: ICD-10-CM

## 2021-12-03 RX ORDER — ZOLPIDEM TARTRATE 5 MG/1
TABLET ORAL
Qty: 30 TABLET | Refills: 0 | Status: SHIPPED | OUTPATIENT
Start: 2021-12-03 | End: 2021-12-30

## 2021-12-03 NOTE — TELEPHONE ENCOUNTER
Patient calling for a refill on her Ambien to be sent to [facility] on [facility].  Shamika Dalton MA  [facility]  2nd Floor  Primary Care

## 2021-12-03 NOTE — TELEPHONE ENCOUNTER
I have never seen patient. I refilled last time as her primary care provider was out of clinic. I will forward to her primary care provider.  FIONA Brewer CNP

## 2021-12-24 ENCOUNTER — PATIENT OUTREACH (OUTPATIENT)
Dept: FAMILY MEDICINE | Facility: CLINIC | Age: 36
End: 2021-12-24
Payer: COMMERCIAL

## 2021-12-24 DIAGNOSIS — R87.810 CERVICAL HIGH RISK HPV (HUMAN PAPILLOMAVIRUS) TEST POSITIVE: ICD-10-CM

## 2021-12-29 DIAGNOSIS — F51.01 PRIMARY INSOMNIA: ICD-10-CM

## 2021-12-30 RX ORDER — ZOLPIDEM TARTRATE 5 MG/1
TABLET ORAL
Qty: 30 TABLET | Refills: 0 | Status: SHIPPED | OUTPATIENT
Start: 2021-12-30 | End: 2022-01-05 | Stop reason: ALTCHOICE

## 2021-12-30 NOTE — TELEPHONE ENCOUNTER
Pt calling about the status of this refill.    Pt not sure why each month medication is delayed each month. Pt contacted pharmacy on Monday or Tuesday about this. Pt has been scheduled for virtual visit with [facility] on January 5.    Marixa Monet,   [facility]

## 2022-01-05 ENCOUNTER — VIRTUAL VISIT (OUTPATIENT)
Dept: FAMILY MEDICINE | Facility: CLINIC | Age: 37
End: 2022-01-05
Payer: COMMERCIAL

## 2022-01-05 DIAGNOSIS — F51.01 PRIMARY INSOMNIA: ICD-10-CM

## 2022-01-05 PROCEDURE — 99214 OFFICE O/P EST MOD 30 MIN: CPT | Mod: 95 | Performed by: FAMILY MEDICINE

## 2022-01-05 RX ORDER — ESZOPICLONE 2 MG/1
2 TABLET, FILM COATED ORAL AT BEDTIME
Qty: 30 TABLET | Refills: 1 | Status: SHIPPED | OUTPATIENT
Start: 2022-01-05 | End: 2022-02-16 | Stop reason: SINTOL

## 2022-01-05 NOTE — PROGRESS NOTES
Domitila is a 36 year old who is being evaluated via a billable video visit.      How would you like to obtain your AVS? MyChart  If the video visit is dropped, the invitation should be resent by:   Will anyone else be joining your video visit? No    Video Start Time: 4:55 PM    Assessment & Plan     Primary insomnia  Failed zolpidem - trial of lunesta.  - eszopiclone (LUNESTA) 2 MG tablet; Take 1 tablet (2 mg) by mouth At Bedtime    The uses and side effects, including black box warnings as appropriate, were discussed in detail.  All patient questions were answered.  The patient was instructed to call immediately if any side effects developed.     Return in about 4 weeks (around 2/2/2022).    Lisa Carney MD  [facility]   Domitila is a 36 year old who presents for the following health issues     HPI     Insomnia - taking ambien and still unable to fall asleep. Then wanting to sleep all day.  Not taking everyday and sometimes it helps and some times it doesn't.  Some improvement since kids are back in school.  Typically getting at least 4 hours of sleep per night. She is sometimes not sleepy at all.  She works from home.    Review of Systems   Constitutional, HEENT, cardiovascular, pulmonary, gi and gu systems are negative, except as otherwise noted.      Objective           Vitals:  No vitals were obtained today due to virtual visit.    Physical Exam   GENERAL: Healthy, alert and no distress  EYES: Eyes grossly normal to inspection.  No discharge or erythema, or obvious scleral/conjunctival abnormalities.  RESP: No audible wheeze, cough, or visible cyanosis.  No visible retractions or increased work of breathing.    SKIN: Visible skin clear. No significant rash, abnormal pigmentation or lesions.  NEURO: Cranial nerves grossly intact.  Mentation and speech appropriate for age.  PSYCH: Mentation appears normal, affect normal/bright, judgement and insight intact,  normal speech and appearance well-groomed.          Video-Visit Details    Type of service:  Video Visit    Video End Time:5:05 PM    Originating Location (pt. Location): Home    Distant Location (provider location):  [facility]     Platform used for Video Visit: [employer]

## 2022-01-13 ENCOUNTER — ALLIED HEALTH/NURSE VISIT (OUTPATIENT)
Dept: FAMILY MEDICINE | Facility: CLINIC | Age: 37
End: 2022-01-13
Payer: COMMERCIAL

## 2022-01-13 VITALS
OXYGEN SATURATION: 98 % | SYSTOLIC BLOOD PRESSURE: 130 MMHG | TEMPERATURE: 97.4 F | DIASTOLIC BLOOD PRESSURE: 51 MMHG | WEIGHT: 147.2 LBS | HEART RATE: 77 BPM | HEIGHT: 68 IN | BODY MASS INDEX: 22.31 KG/M2

## 2022-01-13 DIAGNOSIS — Z32.00 PREGNANCY EXAMINATION OR TEST, PREGNANCY UNCONFIRMED: Primary | ICD-10-CM

## 2022-01-13 LAB — HCG UR QL: NEGATIVE

## 2022-01-13 PROCEDURE — 99207 PR NO CHARGE NURSE ONLY: CPT

## 2022-01-13 PROCEDURE — 96372 THER/PROPH/DIAG INJ SC/IM: CPT | Performed by: NURSE PRACTITIONER

## 2022-01-13 PROCEDURE — 81025 URINE PREGNANCY TEST: CPT

## 2022-01-13 RX ADMIN — MEDROXYPROGESTERONE ACETATE 150 MG: 150 INJECTION, SUSPENSION INTRAMUSCULAR at 10:48

## 2022-01-13 ASSESSMENT — PAIN SCALES - GENERAL: PAINLEVEL: NO PAIN (0)

## 2022-01-13 ASSESSMENT — MIFFLIN-ST. JEOR: SCORE: 1412.22

## 2022-01-13 NOTE — NURSING NOTE
BP: 130/51    LAST PAP/EXAM:   Lab Results   Component Value Date    PAP NIL 01/11/2021     URINE HCG:negative    The following medication was given:     MEDICATION: Depo Provera 150mg  ROUTE: IM  SITE: Left glut  : [employer]  LOT #: [id number]  EXP:04/2023  NEXT INJECTION DUE: 3/31/22 - 4/14/22   Provider: Karen Cintron    No depression per patient.    JONNATHAN Ortega MA

## 2022-02-09 ENCOUNTER — MYC MEDICAL ADVICE (OUTPATIENT)
Dept: FAMILY MEDICINE | Facility: CLINIC | Age: 37
End: 2022-02-09

## 2022-02-09 DIAGNOSIS — F51.01 PRIMARY INSOMNIA: Primary | ICD-10-CM

## 2022-02-10 NOTE — TELEPHONE ENCOUNTER
Routing to provider to review and advise.    Pt does not like how Lunesta leaves a metallic taste.    Pt had visit for yesterday but was rescheduled.    Lizzie Jameson RN  [facility]

## 2022-02-16 RX ORDER — RAMELTEON 8 MG/1
8 TABLET ORAL
Qty: 30 TABLET | Refills: 1 | Status: SHIPPED | OUTPATIENT
Start: 2022-02-16 | End: 2022-03-28 | Stop reason: ALTCHOICE

## 2022-02-23 ENCOUNTER — TELEPHONE (OUTPATIENT)
Dept: FAMILY MEDICINE | Facility: CLINIC | Age: 37
End: 2022-02-23
Payer: COMMERCIAL

## 2022-03-02 ENCOUNTER — OFFICE VISIT (OUTPATIENT)
Dept: FAMILY MEDICINE | Facility: CLINIC | Age: 37
End: 2022-03-02
Payer: COMMERCIAL

## 2022-03-02 VITALS
DIASTOLIC BLOOD PRESSURE: 61 MMHG | BODY MASS INDEX: 22.05 KG/M2 | RESPIRATION RATE: 20 BRPM | TEMPERATURE: 98.3 F | HEART RATE: 80 BPM | SYSTOLIC BLOOD PRESSURE: 113 MMHG | HEIGHT: 68 IN | WEIGHT: 145.5 LBS | OXYGEN SATURATION: 98 %

## 2022-03-02 DIAGNOSIS — Z11.3 SCREEN FOR STD (SEXUALLY TRANSMITTED DISEASE): ICD-10-CM

## 2022-03-02 DIAGNOSIS — Z12.4 CERVICAL CANCER SCREENING: ICD-10-CM

## 2022-03-02 DIAGNOSIS — Z00.00 ROUTINE GENERAL MEDICAL EXAMINATION AT A HEALTH CARE FACILITY: Primary | ICD-10-CM

## 2022-03-02 PROCEDURE — 87389 HIV-1 AG W/HIV-1&-2 AB AG IA: CPT | Performed by: FAMILY MEDICINE

## 2022-03-02 PROCEDURE — 86780 TREPONEMA PALLIDUM: CPT | Performed by: FAMILY MEDICINE

## 2022-03-02 PROCEDURE — 99395 PREV VISIT EST AGE 18-39: CPT | Performed by: FAMILY MEDICINE

## 2022-03-02 PROCEDURE — 86803 HEPATITIS C AB TEST: CPT | Performed by: FAMILY MEDICINE

## 2022-03-02 PROCEDURE — 87624 HPV HI-RISK TYP POOLED RSLT: CPT | Performed by: FAMILY MEDICINE

## 2022-03-02 PROCEDURE — 36415 COLL VENOUS BLD VENIPUNCTURE: CPT | Performed by: FAMILY MEDICINE

## 2022-03-02 PROCEDURE — G0145 SCR C/V CYTO,THINLAYER,RESCR: HCPCS | Performed by: FAMILY MEDICINE

## 2022-03-02 PROCEDURE — 87591 N.GONORRHOEAE DNA AMP PROB: CPT | Performed by: FAMILY MEDICINE

## 2022-03-02 PROCEDURE — 87340 HEPATITIS B SURFACE AG IA: CPT | Performed by: FAMILY MEDICINE

## 2022-03-02 PROCEDURE — 87491 CHLMYD TRACH DNA AMP PROBE: CPT | Performed by: FAMILY MEDICINE

## 2022-03-02 RX ORDER — 1.1% SODIUM FLUORIDE PRESCRIPTION DENTAL CREAM 5 MG/G
CREAM DENTAL
COMMUNITY
Start: 2022-01-17 | End: 2022-09-19

## 2022-03-02 ASSESSMENT — ENCOUNTER SYMPTOMS
NERVOUS/ANXIOUS: 0
HEADACHES: 1
CONSTIPATION: 0
DYSURIA: 0
WEAKNESS: 0
EYE PAIN: 0
ABDOMINAL PAIN: 0
HEMATOCHEZIA: 0
SHORTNESS OF BREATH: 1
JOINT SWELLING: 0
FREQUENCY: 0
ARTHRALGIAS: 0
DIZZINESS: 0
SORE THROAT: 0
FEVER: 0
PARESTHESIAS: 0
CHILLS: 0
COUGH: 0
BREAST MASS: 0
DIARRHEA: 0
NAUSEA: 0
HEMATURIA: 0
PALPITATIONS: 0
MYALGIAS: 0
HEARTBURN: 0

## 2022-03-02 ASSESSMENT — PAIN SCALES - GENERAL: PAINLEVEL: NO PAIN (0)

## 2022-03-02 NOTE — PATIENT INSTRUCTIONS
Patient Education     Prevention Guidelines, Women Ages 18 to 39  Screening tests and vaccines are an important part of managing your health. A screening test is done to find possible disorders or diseases in people who don't have any symptoms. The goal is to find a disease early so lifestyle changes can be made and you can be watched more closely to reduce the risk of disease, or to detect it early enough to treat it most effectively. Screening tests are not considered diagnostic, but are used to determine if more testing is needed. Health counseling is essential, too. Below are guidelines for these, for women ages 18 to 39. Talk with your healthcare provider to make sure you re up-to-date on what you need.   Screening Who needs it How often   Alcohol misuse All women in this age group  At routine exams   Blood pressure All women in this age group  Yearly checkup if your blood pressure is normal   Normal blood pressure is less than 120/80 mm Hg   If your blood pressure reading is higher than normal, follow the advice of your healthcare provider    Breast cancer All women in this age group should talk with their healthcare providers about the need for clinical breast exams (CBE)1  Clinical breast exam every 3 years1    Cervical cancer Women ages 21 and older  Women between ages 21 and 29 should have a Pap test every 3 years; women between ages 30 and 65 are advised to have a Pap test plus an HPV test every 5 years    Chlamydia Sexually active women ages 24 and younger, and women at increased risk for infection  Every 3 years if you're at risk or have symptoms    Depression All women in this age group  At routine exams   Diabetes mellitus, type 2  Adults with no symptoms who are overweight or obese and have 1 or more other risk factors for diabetes  At least every 3 years. Also, testing for diabetes during pregnancy after the 24th week.     Gonorrhea Sexually active women at increased risk for infection  At routine  exams   Hepatitis C Anyone at increased risk  At routine exams   HIV All women At routine exams3     Obesity All women in this age group  At routine exams   Syphilis Women at increased risk for infection should talk with their healthcare provider  At routine exams   Tuberculosis Women at increased risk for infection should talk with their healthcare provider  Ask your healthcare provider    Vision All women in this age group  At least 1 complete exam in your 20s, and 2 in your 30s    Vaccine Who needs it How often   Chickenpox (varicella)  All women in this age group who have no record of this infection or vaccine  2 doses; the second dose should be given 4 to 8 weeks after the first dose    Hepatitis A Women at increased risk for infection should talk with their healthcare provider  2 doses given at least 6 months apart    Hepatitis B Women at increased risk for infection should talk with their healthcare provider  3 doses over 6 months; second dose should be given 1 month after the first dose; the third dose should be given at least 2 months after the second dose and at least 4 months after the first dose    Haemophilus influenzae  Type B (HIB)  Women at increased risk for infection should talk with their healthcare provider  1 to 3 doses   Human papillomavirus (HPV)  All women in this age group up to age 26  3 doses; the second dose should be given 1 to 2 months after the first dose and the third dose given 6 months after the first dose    Influenza (flu) All women in this age group  Once a year   Measles, mumps, rubella (MMR)  All women in this age group who have no record of these infections or vaccines  1 or 2 doses   Meningococcal Women at increased risk for infection should talk with their healthcare provider  1 or more doses   Pneumococcal conjugate vaccine (PCV13) and pneumococcal polysaccharide vaccine (PPSV23)  Women at increased risk for infection should talk with their healthcare provider  PCV13: 1  dose ages 19 to 65 (protects against 13 types of pneumococcal bacteria)   PPSV23: 1 to 2 doses through age 64, or 1 dose at 65 or older (protects against 23 types of pneumococcal bacteria)    Tetanus/diphtheria/pertussis (Td/Tdap) booster All women in this age group  Td every 10 years, or a one-time dose of Tdap instead of a Td booster after age 18, then Td every 10 years    Counseling Who needs it How often   BRCA gene mutation testing for breast and ovarian cancer susceptibility  Women with increased risk for having gene mutation  When your risk is known    Breast cancer and chemoprevention  Women at high risk for breast cancer  When your risk is known    Diet and exercise Women who are overweight or obese  When diagnosed, and then at routine exams    Domestic violence Women at the age in which they are able to have children  At routine exams   Sexually transmitted infection prevention  Women who are sexually active  At routine exams   Skin cancer Prevention of skin cancer in fair-skinned adults  At routine exams   Use of tobacco and the health effects it can cause  All women in this age group  Every visit   1 According to the ACS, women ages 20 to 39 years should have a clinical breast exam (CBE) as part of their routine health exam every 3 years. Breast self-exams are an option for women starting in their 20s. But the U.S. Preventive Services Task Force (USPSTF) does not recommend CBE.   2 Those who are 18 years old and not up-to-date on their childhood vaccines should get all appropriate catch-up vaccines recommended by the CDC.   3 The USPSTF recommends that all people ages 15 to 65 years be screened for HIV and those younger or older people at increased risk. The CDC recommends that everyone between the ages of 13 and 64 get tested for HIV at least once as part of routine health care.   Zachary last reviewed this educational content on 10/1/2017    5089-7483 [employer]. All rights reserved.  This information is not intended as a substitute for professional medical care. Always follow your healthcare professional's instructions.

## 2022-03-02 NOTE — PROGRESS NOTES
SUBJECTIVE:   CC: Cruz Obregon is an 36 year old woman who presents for preventive health visit.       Patient has been advised of split billing requirements and indicates understanding: Yes  Healthy Habits:     Getting at least 3 servings of Calcium per day:  NO    Bi-annual eye exam:  Yes    Dental care twice a year:  Yes    Sleep apnea or symptoms of sleep apnea:  Daytime drowsiness    Diet:  Regular (no restrictions)    Frequency of exercise:  None    Taking medications regularly:  Yes    Medication side effects:  Other    PHQ-2 Total Score: 0    Additional concerns today:  No      Today's PHQ-2 Score:   PHQ-2 ( 1999 Pfizer) 3/2/2022   Q1: Little interest or pleasure in doing things 0   Q2: Feeling down, depressed or hopeless 0   PHQ-2 Score 0   PHQ-2 Total Score (12-17 Years)- Positive if 3 or more points; Administer PHQ-A if positive -   Q1: Little interest or pleasure in doing things Not at all   Q2: Feeling down, depressed or hopeless Not at all   PHQ-2 Score 0       Abuse: Current or Past (Physical, Sexual or Emotional) - No  Do you feel safe in your environment? Yes    Have you ever done Advance Care Planning? (For example, a Health Directive, POLST, or a discussion with a medical provider or your loved ones about your wishes): No, advance care planning information given to patient to review.  Patient plans to discuss their wishes with loved ones or provider.      Social History     Tobacco Use     Smoking status: Never Smoker     Smokeless tobacco: Never Used   Substance Use Topics     Alcohol use: No     Alcohol/week: 0.0 standard drinks     If you drink alcohol do you typically have >3 drinks per day or >7 drinks per week? No    Alcohol Use 3/2/2022   Prescreen: >3 drinks/day or >7 drinks/week? No   Prescreen: >3 drinks/day or >7 drinks/week? -   No flowsheet data found.    Reviewed orders with patient.  Reviewed health maintenance and updated orders accordingly - Yes  Lab work is in  process    Breast Cancer Screening:    Breast CA Risk Assessment (FHS-7) 3/2/2022   Do you have a family history of breast, colon, or ovarian cancer? No / Unknown           Pertinent mammograms are reviewed under the imaging tab.    History of abnormal Pap smear: YES - updated in Problem List and Health Maintenance accordingly  PAP / HPV Latest Ref Rng & Units 1/11/2021 4/24/2017 2/18/2014   PAP (Historical) - NIL NIL NIL   HPV16 NEG:Negative Negative - -   HPV18 NEG:Negative Negative - -   HRHPV NEG:Negative Positive(A) - -     Reviewed and updated as needed this visit by clinical staff   Tobacco  Allergies  Meds  Problems  Med Hx  Surg Hx  Fam Hx          Reviewed and updated as needed this visit by Provider   Tobacco  Allergies  Meds  Problems  Med Hx  Surg Hx  Fam Hx             Review of Systems   Constitutional: Negative for chills and fever.   HENT: Negative for congestion, ear pain, hearing loss and sore throat.    Eyes: Positive for visual disturbance. Negative for pain.   Respiratory: Positive for shortness of breath. Negative for cough.    Cardiovascular: Negative for chest pain, palpitations and peripheral edema.   Gastrointestinal: Negative for abdominal pain, constipation, diarrhea, heartburn, hematochezia and nausea.   Breasts:  Negative for tenderness, breast mass and discharge.   Genitourinary: Positive for vaginal discharge. Negative for dysuria, frequency, genital sores, hematuria, pelvic pain, urgency and vaginal bleeding.   Musculoskeletal: Negative for arthralgias, joint swelling and myalgias.   Skin: Negative for rash.   Neurological: Positive for headaches. Negative for dizziness, weakness and paresthesias.   Psychiatric/Behavioral: Negative for mood changes. The patient is not nervous/anxious.         OBJECTIVE:   /61 (BP Location: Left arm, Patient Position: Sitting, Cuff Size: Adult Regular)   Pulse 80   Temp 98.3  F (36.8  C) (Oral)   Resp 20   Ht 1.721 m (5'  "7.75\")   Wt 66 kg (145 lb 8 oz)   SpO2 98%   Breastfeeding No   BMI 22.29 kg/m    Physical Exam  GENERAL: healthy, alert and no distress  EYES: Eyes grossly normal to inspection, PERRL and conjunctivae and sclerae normal  HENT: ear canals and TM's normal, nose and mouth without ulcers or lesions  NECK: no adenopathy, no asymmetry, masses, or scars and thyroid normal to palpation  RESP: lungs clear to auscultation - no rales, rhonchi or wheezes  BREAST: normal without masses, tenderness or nipple discharge, no palpable axillary masses or adenopathy and implant bilateral with keloid scarring  CV: regular rate and rhythm, normal S1 S2, no S3 or S4, no murmur, click or rub, no peripheral edema and peripheral pulses strong  ABDOMEN: soft, nontender, no hepatosplenomegaly, no masses and bowel sounds normal   (female): normal female external genitalia, normal urethral meatus, vaginal mucosa pink, moist, well rugated, and normal cervix/adnexa/uterus without masses or discharge  MS: no gross musculoskeletal defects noted, no edema  SKIN: no suspicious lesions or rashes  NEURO: Normal strength and tone, mentation intact and speech normal  PSYCH: mentation appears normal, affect normal/bright    Diagnostic Test Results:  Labs reviewed in Epic    ASSESSMENT/PLAN:   (Z00.00) Routine general medical examination at a health care facility  (primary encounter diagnosis)  Comment:   Plan: Routine preventive discussed    (Z12.4) Cervical cancer screening  Comment:   Plan: Pap Screen with HPV - recommended age 30 - 65         years        Additional screening due to abnormals    (Z11.3) Screen for STD (sexually transmitted disease)  Comment:   Plan: NEISSERIA GONORRHOEA PCR, CHLAMYDIA TRACHOMATIS        PCR, HIV Antigen Antibody Combo, Hepatitis C         antibody, Hepatitis B surface antigen,         Treponema Abs w Reflex to RPR and Titer        Screening          COUNSELING:  Reviewed preventive health counseling, as reflected " "in patient instructions    Estimated body mass index is 22.29 kg/m  as calculated from the following:    Height as of this encounter: 1.721 m (5' 7.75\").    Weight as of this encounter: 66 kg (145 lb 8 oz).        She reports that she has never smoked. She has never used smokeless tobacco.      Counseling Resources:  ATP IV Guidelines  Pooled Cohorts Equation Calculator  Breast Cancer Risk Calculator  BRCA-Related Cancer Risk Assessment: FHS-7 Tool  FRAX Risk Assessment  ICSI Preventive Guidelines  Dietary Guidelines for Americans, 2010  USDA's MyPlate  ASA Prophylaxis  Lung CA Screening    Lisa Carney MD  [facility]  "

## 2022-03-03 LAB
C TRACH DNA SPEC QL NAA+PROBE: NEGATIVE
N GONORRHOEA DNA SPEC QL NAA+PROBE: NEGATIVE

## 2022-03-04 LAB
BKR LAB AP GYN ADEQUACY: NORMAL
BKR LAB AP GYN INTERPRETATION: NORMAL
BKR LAB AP HPV REFLEX: NORMAL
BKR LAB AP PREVIOUS ABNL DX: NORMAL
BKR LAB AP PREVIOUS ABNORMAL: NORMAL
PATH REPORT.COMMENTS IMP SPEC: NORMAL
PATH REPORT.COMMENTS IMP SPEC: NORMAL
PATH REPORT.RELEVANT HX SPEC: NORMAL

## 2022-03-04 NOTE — RESULT ENCOUNTER NOTE
MsDagoberto Obregon,    Neither hepatitis B, hepatitis C, HIV, syphilis, gonorrhea nor chlamydia were found.     Please contact the clinic if you have additional questions.  Thank you.    Sincerely,    Lisa Carney MD

## 2022-03-08 LAB
HUMAN PAPILLOMA VIRUS 16 DNA: NEGATIVE
HUMAN PAPILLOMA VIRUS 18 DNA: NEGATIVE
HUMAN PAPILLOMA VIRUS FINAL DIAGNOSIS: NORMAL
HUMAN PAPILLOMA VIRUS OTHER HR: NEGATIVE

## 2022-03-09 ENCOUNTER — PATIENT OUTREACH (OUTPATIENT)
Dept: FAMILY MEDICINE | Facility: CLINIC | Age: 37
End: 2022-03-09
Payer: COMMERCIAL

## 2022-03-27 ENCOUNTER — MYC MEDICAL ADVICE (OUTPATIENT)
Dept: FAMILY MEDICINE | Facility: CLINIC | Age: 37
End: 2022-03-27
Payer: COMMERCIAL

## 2022-03-27 DIAGNOSIS — F51.01 PRIMARY INSOMNIA: ICD-10-CM

## 2022-03-28 RX ORDER — ZOLPIDEM TARTRATE 5 MG/1
5 TABLET ORAL
Qty: 30 TABLET | Refills: 3 | Status: SHIPPED | OUTPATIENT
Start: 2022-03-28 | End: 2022-07-25

## 2022-03-31 ENCOUNTER — ALLIED HEALTH/NURSE VISIT (OUTPATIENT)
Dept: FAMILY MEDICINE | Facility: CLINIC | Age: 37
End: 2022-03-31
Payer: COMMERCIAL

## 2022-03-31 VITALS
SYSTOLIC BLOOD PRESSURE: 113 MMHG | DIASTOLIC BLOOD PRESSURE: 52 MMHG | HEART RATE: 62 BPM | WEIGHT: 146 LBS | OXYGEN SATURATION: 100 % | BODY MASS INDEX: 22.36 KG/M2

## 2022-03-31 DIAGNOSIS — Z30.42 ENCOUNTER FOR SURVEILLANCE OF INJECTABLE CONTRACEPTIVE: Primary | ICD-10-CM

## 2022-03-31 PROCEDURE — 96372 THER/PROPH/DIAG INJ SC/IM: CPT | Performed by: NURSE PRACTITIONER

## 2022-03-31 PROCEDURE — 99207 PR NO CHARGE NURSE ONLY: CPT

## 2022-03-31 RX ADMIN — MEDROXYPROGESTERONE ACETATE 150 MG: 150 INJECTION, SUSPENSION INTRAMUSCULAR at 11:22

## 2022-03-31 NOTE — NURSING NOTE
BP: Data Unavailable    LAST PAP/EXAM:   Lab Results   Component Value Date    PAP NIL 01/11/2021     URINE HCG:not indicated    The following medication was given:     MEDICATION: Depo Provera 150mg  ROUTE: IM  SITE: right glut  : [employer]  LOT #: [id number]  EXP:7/2023  NEXT INJECTION DUE: 6/16/22 - 6/30/22   Provider: Dr. Weller    No new depression symptoms    JONNATHAN Ortega MA

## 2022-05-17 ENCOUNTER — TELEPHONE (OUTPATIENT)
Dept: FAMILY MEDICINE | Facility: CLINIC | Age: 37
End: 2022-05-17
Payer: COMMERCIAL

## 2022-05-17 DIAGNOSIS — Z11.3 SCREEN FOR STD (SEXUALLY TRANSMITTED DISEASE): Primary | ICD-10-CM

## 2022-05-17 NOTE — TELEPHONE ENCOUNTER
Domitila called and would like to know if Dr. Sharon Carney could fit her into her schedule before July. She is requesting an STD check.    [facility] Scheduling

## 2022-05-18 NOTE — TELEPHONE ENCOUNTER
Called patient and let her know that lab orders were placed.  Anahi Adhikari  [facility]  2nd Floor  Primary Care

## 2022-06-17 ENCOUNTER — LAB (OUTPATIENT)
Dept: LAB | Facility: CLINIC | Age: 37
End: 2022-06-17

## 2022-06-17 ENCOUNTER — ALLIED HEALTH/NURSE VISIT (OUTPATIENT)
Dept: FAMILY MEDICINE | Facility: CLINIC | Age: 37
End: 2022-06-17
Payer: COMMERCIAL

## 2022-06-17 DIAGNOSIS — Z11.3 SCREEN FOR STD (SEXUALLY TRANSMITTED DISEASE): ICD-10-CM

## 2022-06-17 DIAGNOSIS — Z30.42 ENCOUNTER FOR SURVEILLANCE OF INJECTABLE CONTRACEPTIVE: Primary | ICD-10-CM

## 2022-06-17 PROCEDURE — 86803 HEPATITIS C AB TEST: CPT

## 2022-06-17 PROCEDURE — 87340 HEPATITIS B SURFACE AG IA: CPT

## 2022-06-17 PROCEDURE — 99207 PR NO CHARGE NURSE ONLY: CPT

## 2022-06-17 PROCEDURE — 87389 HIV-1 AG W/HIV-1&-2 AB AG IA: CPT

## 2022-06-17 PROCEDURE — 36415 COLL VENOUS BLD VENIPUNCTURE: CPT

## 2022-06-17 PROCEDURE — 96372 THER/PROPH/DIAG INJ SC/IM: CPT | Performed by: NURSE PRACTITIONER

## 2022-06-17 PROCEDURE — 87491 CHLMYD TRACH DNA AMP PROBE: CPT

## 2022-06-17 PROCEDURE — 87591 N.GONORRHOEAE DNA AMP PROB: CPT

## 2022-06-17 PROCEDURE — 86780 TREPONEMA PALLIDUM: CPT

## 2022-06-17 RX ORDER — MEDROXYPROGESTERONE ACETATE 150 MG/ML
150 INJECTION, SUSPENSION INTRAMUSCULAR ONCE
Status: COMPLETED | OUTPATIENT
Start: 2022-06-17 | End: 2022-06-17

## 2022-06-17 RX ADMIN — MEDROXYPROGESTERONE ACETATE 150 MG: 150 INJECTION, SUSPENSION INTRAMUSCULAR at 09:41

## 2022-06-17 NOTE — NURSING NOTE
The following medication was given:     MEDICATION: Medroxyprogesterone 150 mg  ROUTE: IM  SITE: [facility]  DOSE: single dose  LOT #: [id number]  :  [employer]  EXPIRATION DATE:  Sep/2023  NDC#: 40992-027-51

## 2022-06-18 LAB
C TRACH DNA SPEC QL NAA+PROBE: NEGATIVE
N GONORRHOEA DNA SPEC QL NAA+PROBE: NEGATIVE

## 2022-07-23 DIAGNOSIS — F51.01 PRIMARY INSOMNIA: ICD-10-CM

## 2022-07-25 RX ORDER — ZOLPIDEM TARTRATE 5 MG/1
5 TABLET ORAL
Qty: 30 TABLET | Refills: 3 | Status: SHIPPED | OUTPATIENT
Start: 2022-07-25 | End: 2022-11-28

## 2022-07-28 ENCOUNTER — OFFICE VISIT (OUTPATIENT)
Dept: URGENT CARE | Facility: URGENT CARE | Age: 37
End: 2022-07-28
Payer: COMMERCIAL

## 2022-07-28 ENCOUNTER — TELEPHONE (OUTPATIENT)
Dept: FAMILY MEDICINE | Facility: CLINIC | Age: 37
End: 2022-07-28

## 2022-07-28 VITALS
DIASTOLIC BLOOD PRESSURE: 83 MMHG | BODY MASS INDEX: 21.9 KG/M2 | TEMPERATURE: 97.5 F | HEART RATE: 64 BPM | SYSTOLIC BLOOD PRESSURE: 130 MMHG | OXYGEN SATURATION: 99 % | WEIGHT: 143 LBS | RESPIRATION RATE: 16 BRPM

## 2022-07-28 DIAGNOSIS — L03.011 PARONYCHIA OF RIGHT LITTLE FINGER: Primary | ICD-10-CM

## 2022-07-28 DIAGNOSIS — L03.113 CELLULITIS OF HAND, RIGHT: ICD-10-CM

## 2022-07-28 PROCEDURE — 99213 OFFICE O/P EST LOW 20 MIN: CPT | Performed by: PHYSICIAN ASSISTANT

## 2022-07-28 RX ORDER — IBUPROFEN 600 MG/1
600 TABLET, FILM COATED ORAL EVERY 6 HOURS PRN
Qty: 30 TABLET | Refills: 0 | Status: SHIPPED | OUTPATIENT
Start: 2022-07-28 | End: 2023-10-05

## 2022-07-28 RX ORDER — CEPHALEXIN 500 MG/1
500 CAPSULE ORAL 4 TIMES DAILY
Qty: 40 CAPSULE | Refills: 0 | Status: SHIPPED | OUTPATIENT
Start: 2022-07-28 | End: 2022-08-07

## 2022-07-28 ASSESSMENT — ENCOUNTER SYMPTOMS
RESPIRATORY NEGATIVE: 1
NECK PAIN: 0
CHILLS: 0
PALPITATIONS: 0
CONSTITUTIONAL NEGATIVE: 1
COLOR CHANGE: 1
SHORTNESS OF BREATH: 0
NECK STIFFNESS: 0
FEVER: 0
MYALGIAS: 1
CARDIOVASCULAR NEGATIVE: 1
WHEEZING: 0
ARTHRALGIAS: 1
BACK PAIN: 0
JOINT SWELLING: 1
WOUND: 0
COUGH: 0
FATIGUE: 0

## 2022-07-28 NOTE — TELEPHONE ENCOUNTER
"Pt called because she had artificial nails and she works in commercial cleaning.  She had noticed her right pinky \"throabing\". Yesterday she took off all of the artificial nails and she soak her pinky in soapy water and vinegar and squeezed out the puss from under her nail.  After squeezing it out she felt a \"tingling sensation\" throughout her hand.   This morning she noticed her right hand feel tight and is swollen.    This has happened to her before with the artificial nails. When she squeezed out the puss and soaked her nails it went away, but she states that this time it is different.    Advised pt that she needs to be seen to have infection treated. Said she will be seen in a UC today.      Lizzie Jameson RN  [facility]    "

## 2022-07-28 NOTE — PROGRESS NOTES
Moises Ramesh is a 36 year old, presenting for the following health issues:  Urgent Care and Finger (Got pus out of right pinky finger and the whole hand been swollen )    HPI   Musculoskeletal problem/pain  Onset/Duration: 2days  Description  Location: R little finger, and R hand  Joint Swelling: Yes  Redness: Yes  Pain: YES  Warmth: no  Intensity:  moderate  Progression of Symptoms:  same  Accompanying signs and symptoms:   Fevers: no  Numbness/tingling/weakness: Yes  History  Trauma to the area: Yes, she had jammed her finger while cleaning and later removed her fake fingernail which revealed pus present under the R little finger.  Recent illness:  no  Previous similar problem: no  Previous evaluation:  no  Precipitating or alleviating factors:  Aggravating factors include: pressure, overuse  Therapies tried and outcome: rest/inactivity, heat, ice, immobilization, acetaminophen with minimal relief  Patient Active Problem List   Diagnosis     CARDIOVASCULAR SCREENING; LDL GOAL LESS THAN 160     Mild Mitral insufficiency     Mild Tricuspid insufficiency     Bacterial vaginosis     Elevated antinuclear antibody (CAMILO) level     Tension headache     Hypersomnia     Joint pain     Paresthesias, right hand     Chronic daily headache     Cervical high risk HPV (human papillomavirus) test positive     Frequent menstruation     Chest pain     Low back pain     Esophageal reflux (GERD)     History of 2019 novel coronavirus disease (COVID-19)     Current Outpatient Medications   Medication     Loratadine (CLARITIN PO)     medroxyPROGESTERone (DEPO-PROVERA) 150 MG/ML IM injection     zolpidem (AMBIEN) 5 MG tablet     SF 5000 PLUS 1.1 % CREA     No current facility-administered medications for this visit.        Allergies   Allergen Reactions     Sulfa Drugs Rash     Sulfamethoxazole Rash       Review of Systems   Constitutional: Negative.  Negative for chills, fatigue and fever.   Respiratory: Negative.  Negative for  cough, shortness of breath and wheezing.    Cardiovascular: Negative.  Negative for chest pain, palpitations and peripheral edema.   Genitourinary: Negative.    Musculoskeletal: Positive for arthralgias, joint swelling and myalgias. Negative for back pain, gait problem, neck pain and neck stiffness.   Skin: Positive for color change. Negative for pallor, rash and wound.   All other systems reviewed and are negative.           Objective    /83 (BP Location: Left arm, Patient Position: Sitting, Cuff Size: Adult Regular)   Pulse 64   Temp 97.5  F (36.4  C) (Tympanic)   Resp 16   Wt 64.9 kg (143 lb)   SpO2 99%   BMI 21.90 kg/m    Body mass index is 21.9 kg/m .  Physical Exam  Vitals and nursing note reviewed.   Constitutional:       General: She is not in acute distress.     Appearance: Normal appearance. She is well-developed and normal weight. She is not ill-appearing.   Musculoskeletal:      Right wrist: Normal. No swelling, deformity, effusion, lacerations, tenderness, bony tenderness or crepitus. Normal range of motion.      Right hand: Swelling (mild eyrthema) and tenderness (over the R little fingertip) present. No deformity, lacerations or bony tenderness. Normal range of motion. Normal strength. Normal strength of finger abduction, thumb/finger opposition and wrist extension. Normal sensation. There is no disruption of two-point discrimination. Normal capillary refill. Normal pulse.      Left hand: Normal. No tenderness. Normal strength. Normal sensation.   Skin:     General: Skin is warm and dry.      Capillary Refill: Capillary refill takes less than 2 seconds.      Comments: Distal pulses are 2+ and symmetric.  No peripheral edema.   Neurological:      Mental Status: She is alert and oriented to person, place, and time.      Sensory: Sensation is intact. No sensory deficit.      Motor: Motor function is intact.      Gait: Gait is intact. Gait normal.      Deep Tendon Reflexes: Reflexes are normal  and symmetric.   Psychiatric:         Mood and Affect: Mood normal.         Behavior: Behavior normal.         Thought Content: Thought content normal.         Judgment: Judgment normal.          Assessment/Plan:  Paronychia of right little finger: Will treat with [id number] (allergy to sulfa).  Recommend tylenol/ibuprofen prn pain/fever and warm water soaks.   Rest, fluids, chicken soup.  Recheck in clinic if symptoms worsen or if symptoms do not improve.    -     cephALEXin (KEFLEX) 500 MG capsule; Take 1 capsule (500 mg) by mouth 4 times daily for 10 days  -     ibuprofen (ADVIL/MOTRIN) 600 MG tablet; Take 1 tablet (600 mg) by mouth every 6 hours as needed for moderate pain    Cellulitis of hand, right:  Same treatment as above.        ZA Rosario  ..

## 2022-08-03 ENCOUNTER — OFFICE VISIT (OUTPATIENT)
Dept: FAMILY MEDICINE | Facility: CLINIC | Age: 37
End: 2022-08-03
Payer: COMMERCIAL

## 2022-08-03 VITALS
SYSTOLIC BLOOD PRESSURE: 111 MMHG | HEART RATE: 51 BPM | DIASTOLIC BLOOD PRESSURE: 54 MMHG | TEMPERATURE: 98.8 F | HEIGHT: 68 IN | BODY MASS INDEX: 22.25 KG/M2 | OXYGEN SATURATION: 98 % | WEIGHT: 146.8 LBS | RESPIRATION RATE: 16 BRPM

## 2022-08-03 DIAGNOSIS — F51.01 PRIMARY INSOMNIA: ICD-10-CM

## 2022-08-03 DIAGNOSIS — J30.1 SEASONAL ALLERGIC RHINITIS DUE TO POLLEN: Primary | ICD-10-CM

## 2022-08-03 DIAGNOSIS — R20.0 NUMBNESS AND TINGLING IN RIGHT HAND: ICD-10-CM

## 2022-08-03 DIAGNOSIS — R20.2 NUMBNESS AND TINGLING IN RIGHT HAND: ICD-10-CM

## 2022-08-03 PROCEDURE — 99214 OFFICE O/P EST MOD 30 MIN: CPT | Performed by: FAMILY MEDICINE

## 2022-08-03 RX ORDER — FLUTICASONE PROPIONATE 50 MCG
2 SPRAY, SUSPENSION (ML) NASAL DAILY
Qty: 16 G | Refills: 11 | Status: SHIPPED | OUTPATIENT
Start: 2022-08-03

## 2022-08-03 ASSESSMENT — ENCOUNTER SYMPTOMS: WHEEZING: 1

## 2022-08-03 ASSESSMENT — PAIN SCALES - GENERAL: PAINLEVEL: NO PAIN (0)

## 2022-08-03 NOTE — PROGRESS NOTES
Assessment & Plan     Seasonal allergic rhinitis due to pollen  Nasal steroid and OTC medication.  Continue until winter than can attempt to taper off  - fluticasone (FLONASE) 50 MCG/ACT nasal spray; Spray 2 sprays into both nostrils daily    Primary insomnia  Stable - continue ambien    Numbness and tingling in right hand  Maybe related to inflammation from hand infection or cervical radiculopathy - monitor for another week and contact if symptoms persist as it would be less likely to self-resolve    The uses and side effects, including black box warnings as appropriate, were discussed in detail.  All patient questions were answered.  The patient was instructed to call immediately if any side effects developed.     Return in about 1 week (around 8/10/2022), or if symptoms worsen or fail to improve.    Lisa Carney MD  [facility]    Moises Ramesh is a 36 year old, presenting for the following health issues:  Allergies      Allergies    History of Present Illness       Reason for visit:  Allergies  Symptom onset:  3-4 weeks ago  Symptoms include:  Runny nose, sneezing and watery eyes  Symptom intensity:  Moderate  Symptom progression:  Staying the same  Had these symptoms before:  No  What makes it better:  Benadryl    She eats 2-3 servings of fruits and vegetables daily.She consumes 4 sweetened beverage(s) daily.She exercises with enough effort to increase her heart rate 30 to 60 minutes per day.  She exercises with enough effort to increase her heart rate 4 days per week.   She is taking medications regularly.       Allergies  Onset/Duration: 3-4 weeks ago  Symptoms:   Nasal congestion: YES  Sneezing: YES  Red, itchy eyes: YES  Progression of Symptoms: Only get better if taking benadryl or claritin, sometimes meds don't work  Accompanying Signs & Symptoms:  Cough: Occasional, when nasal congestion is bad  Wheezing: YES  Rash: No  Sinus/facial pain: No  History:   Is  "it seasonal: Never had allergies before, first time since starting in the last month   History of Asthma: No  Has allergy testing been done: No  Precipitating factors:   None  Alleviating factors:  medicaiton  Therapies tried and outcome: saline, benadryl and loratadine 20 mg    Insomnia/hypersomnia - doing well with ambien    Tingling from finger to upper arm with wrist tightness since finger infection    Review of Systems   Respiratory: Positive for wheezing.             Objective    /54 (BP Location: Left arm, Patient Position: Sitting, Cuff Size: Adult Regular)   Pulse 51   Temp 98.8  F (37.1  C) (Tympanic)   Resp 16   Ht 1.721 m (5' 7.75\")   Wt 66.6 kg (146 lb 12.8 oz)   SpO2 98%   BMI 22.49 kg/m    Body mass index is 22.49 kg/m .  Physical Exam   GENERAL: healthy, alert and no distress  EYES: Eyes grossly normal to inspection, PERRL and conjunctivae and sclerae normal  HENT: normal cephalic/atraumatic, ear canals and TM's normal, nasal mucosa edematous , oropharynx clear and oral mucous membranes moist  NECK: no adenopathy, no asymmetry, masses, or scars and thyroid normal to palpation  RESP: lungs clear to auscultation - no rales, rhonchi or wheezes  CV: regular rate and rhythm, normal S1 S2, no S3 or S4, no murmur, click or rub, no peripheral edema and peripheral pulses strong  ABDOMEN: soft, nontender, no hepatosplenomegaly, no masses and bowel sounds normal  MS: no gross musculoskeletal defects noted, no edema  PSYCH: mentation appears normal, affect normal/bright                    .  ..  "

## 2022-08-18 ENCOUNTER — MYC MEDICAL ADVICE (OUTPATIENT)
Dept: FAMILY MEDICINE | Facility: CLINIC | Age: 37
End: 2022-08-18

## 2022-08-18 DIAGNOSIS — R20.2 NUMBNESS AND TINGLING IN LEFT ARM: Primary | ICD-10-CM

## 2022-08-18 DIAGNOSIS — R20.0 NUMBNESS AND TINGLING IN LEFT ARM: Primary | ICD-10-CM

## 2022-08-18 NOTE — TELEPHONE ENCOUNTER
Patient states she mentioned hand numbness at 8/3 appointment  Patient also having some numbness in left arm but not as severe as right arm  Patient c/o numbness from shoulder to fingertips.   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

## 2022-08-18 NOTE — TELEPHONE ENCOUNTER
Patient calling to check on the status of her message. Transferred call to the RNs at [facility].  Shamika Dalton [facility]  2nd Floor  Primary Care

## 2022-09-01 ENCOUNTER — ANCILLARY PROCEDURE (OUTPATIENT)
Dept: MRI IMAGING | Facility: CLINIC | Age: 37
End: 2022-09-01
Attending: FAMILY MEDICINE
Payer: COMMERCIAL

## 2022-09-01 DIAGNOSIS — R20.2 NUMBNESS AND TINGLING IN LEFT ARM: ICD-10-CM

## 2022-09-01 DIAGNOSIS — R20.0 NUMBNESS AND TINGLING IN LEFT ARM: ICD-10-CM

## 2022-09-01 PROCEDURE — 72141 MRI NECK SPINE W/O DYE: CPT | Performed by: RADIOLOGY

## 2022-09-02 ENCOUNTER — MYC MEDICAL ADVICE (OUTPATIENT)
Dept: FAMILY MEDICINE | Facility: CLINIC | Age: 37
End: 2022-09-02

## 2022-09-02 DIAGNOSIS — M79.622 PAIN OF LEFT UPPER ARM: ICD-10-CM

## 2022-09-02 DIAGNOSIS — G89.29 CHRONIC RIGHT SHOULDER PAIN: ICD-10-CM

## 2022-09-02 DIAGNOSIS — M25.511 CHRONIC RIGHT SHOULDER PAIN: ICD-10-CM

## 2022-09-02 DIAGNOSIS — R20.2 NUMBNESS AND TINGLING IN LEFT ARM: Primary | ICD-10-CM

## 2022-09-02 DIAGNOSIS — R20.0 NUMBNESS AND TINGLING IN LEFT ARM: Primary | ICD-10-CM

## 2022-09-02 NOTE — TELEPHONE ENCOUNTER
Patient responding to Result Note from PCP on MRI:    Ms. Obregon,   The MRI of your neck was normal.  This means your symptoms may be coming from the shoulder down or be related to a vitamin deficiency.  We could do more testing or have your see a neurologist if you like. Let me know what you are interested in.   Please contact the clinic if you have additional questions.  Thank you.   Sincerely,   Lisa Carney MD      Routing to provider to advise.  Ira SINGHN, RN

## 2022-09-02 NOTE — RESULT ENCOUNTER NOTE
Ms. Obregon,    The MRI of your neck was normal.  This means your symptoms may be coming from the shoulder down or be related to a vitamin deficiency.  We could do more testing or have your see a neurologist if you like. Let me know what you are interested in.    Please contact the clinic if you have additional questions.  Thank you.    Sincerely,    Lisa Carney MD

## 2022-09-12 ENCOUNTER — ALLIED HEALTH/NURSE VISIT (OUTPATIENT)
Dept: FAMILY MEDICINE | Facility: CLINIC | Age: 37
End: 2022-09-12
Payer: COMMERCIAL

## 2022-09-12 DIAGNOSIS — Z30.42 ENCOUNTER FOR SURVEILLANCE OF INJECTABLE CONTRACEPTIVE: Primary | ICD-10-CM

## 2022-09-12 PROCEDURE — 99207 PR NO CHARGE NURSE ONLY: CPT

## 2022-09-12 PROCEDURE — 96372 THER/PROPH/DIAG INJ SC/IM: CPT | Performed by: FAMILY MEDICINE

## 2022-09-12 RX ORDER — MEDROXYPROGESTERONE ACETATE 150 MG/ML
150 INJECTION, SUSPENSION INTRAMUSCULAR
Status: COMPLETED | OUTPATIENT
Start: 2022-09-12 | End: 2023-05-09

## 2022-09-12 RX ADMIN — MEDROXYPROGESTERONE ACETATE 150 MG: 150 INJECTION, SUSPENSION INTRAMUSCULAR at 15:16

## 2022-09-12 NOTE — PROGRESS NOTES
BP: Data Unavailable    LAST PAP/EXAM:   Lab Results   Component Value Date    PAP NIL 01/11/2021     URINE HCG:not indicated    The following medication was given:     MEDICATION: Depo Provera 150mg  ROUTE: IM  SITE: [facility]  : [employer]  LOT #: [id number]  EXP:04/2024  NEXT INJECTION DUE: 11/28/22 - 12/12/22   Provider: Dr. Stan Carney

## 2022-09-19 ENCOUNTER — OFFICE VISIT (OUTPATIENT)
Dept: OPTOMETRY | Facility: CLINIC | Age: 37
End: 2022-09-19
Payer: COMMERCIAL

## 2022-09-19 DIAGNOSIS — H04.123 DRY EYE SYNDROME OF BOTH EYES: ICD-10-CM

## 2022-09-19 DIAGNOSIS — Z01.00 EXAMINATION OF EYES AND VISION: Primary | ICD-10-CM

## 2022-09-19 DIAGNOSIS — H52.03 HYPEROPIA, BILATERAL: ICD-10-CM

## 2022-09-19 DIAGNOSIS — H10.13 ALLERGIC CONJUNCTIVITIS OF BOTH EYES: ICD-10-CM

## 2022-09-19 PROCEDURE — 92014 COMPRE OPH EXAM EST PT 1/>: CPT | Performed by: OPTOMETRIST

## 2022-09-19 PROCEDURE — 92015 DETERMINE REFRACTIVE STATE: CPT | Performed by: OPTOMETRIST

## 2022-09-19 RX ORDER — POLYETHYLENE GLYCOL 400 AND PROPYLENE GLYCOL 4; 3 MG/ML; MG/ML
1 SOLUTION/ DROPS OPHTHALMIC 4 TIMES DAILY
Qty: 6 ML | Refills: 12 | Status: SHIPPED | OUTPATIENT
Start: 2022-09-19 | End: 2023-10-05

## 2022-09-19 RX ORDER — OLOPATADINE HYDROCHLORIDE 2 MG/ML
1 SOLUTION/ DROPS OPHTHALMIC DAILY
Qty: 2.5 ML | Refills: 11 | Status: SHIPPED | OUTPATIENT
Start: 2022-09-19 | End: 2023-10-05

## 2022-09-19 ASSESSMENT — VISUAL ACUITY
OS_SC: 20/20
OD_SC: 20/20
OS_SC: 20/20
METHOD: SNELLEN - LINEAR
OD_SC: 20/25

## 2022-09-19 ASSESSMENT — REFRACTION_MANIFEST
OS_SPHERE: +1.50
OD_CYLINDER: SPHERE
OD_ADD: +1.25
OS_CYLINDER: SPHERE
OD_SPHERE: +1.50
OS_ADD: +1.25

## 2022-09-19 ASSESSMENT — KERATOMETRY
OS_AXISANGLE_DEGREES: 088
OD_AXISANGLE_DEGREES: 078
OD_AXISANGLE2_DEGREES: 168
OS_K2POWER_DIOPTERS: 44.25
OS_K1POWER_DIOPTERS: 43.50
OD_K1POWER_DIOPTERS: 43.50
OD_K2POWER_DIOPTERS: 44.50
OS_AXISANGLE2_DEGREES: 178

## 2022-09-19 ASSESSMENT — REFRACTION_WEARINGRX
OD_SPHERE: +1.50
OD_ADD: +1.25
OS_SPHERE: +1.50
OS_ADD: +1.25
OD_SPHERE: +1.50
OD_CYLINDER: SPHERE
SPECS_TYPE: DIDNT BRING
OS_SPHERE: +1.50
OS_CYLINDER: SPHERE
SPECS_TYPE: SVL

## 2022-09-19 ASSESSMENT — EXTERNAL EXAM - RIGHT EYE: OD_EXAM: NORMAL

## 2022-09-19 ASSESSMENT — CONF VISUAL FIELD
OS_NORMAL: 1
OD_NORMAL: 1

## 2022-09-19 ASSESSMENT — SLIT LAMP EXAM - LIDS
COMMENTS: MEIBOMIAN GLAND DYSFUNCTION
COMMENTS: MEIBOMIAN GLAND DYSFUNCTION

## 2022-09-19 ASSESSMENT — EXTERNAL EXAM - LEFT EYE: OS_EXAM: NORMAL

## 2022-09-19 ASSESSMENT — CUP TO DISC RATIO
OS_RATIO: 0.35
OD_RATIO: 0.35

## 2022-09-19 ASSESSMENT — TONOMETRY
OD_IOP_MMHG: 14
OS_IOP_MMHG: 15
IOP_METHOD: TONOPEN

## 2022-09-19 NOTE — PATIENT INSTRUCTIONS
Systane Ultra 1 drop both eyes 2-4 x day.    Ocusoft lid scrubs at night.     Heat to the eyes daily for 10-15 minutes nightly with warm washcloth or reusable gel masks from the pharmacy or  [employer] heat masks can be purchased at Amazon.    Cold packs for itchy eyes.    Pataday to be used once daily for itchy eyes.  Use as needed. Contact your pharmacy for refills.    Eyeglass prescription given.  No change in eyeglass prescription.    Option for wearing contact lenses with readers over the contacts for near vision.    Return in 1 year for a complete eye exam or sooner if needed.    Jarret Matson, OD    The affects of the dilating drops last for 4- 6 hours.  You will be more sensitive to light and vision will be blurry up close.  Do not drive if you do not feel comfortable.  Mydriatic sunglasses were given if needed.      Optometry Providers       Clinic Locations                                 Telephone Number   Dr. Leny Murray    [facility] [phone]     [facility] Hours:                [facility] Hours:       [facility] Hours:   [address]   [address]      [address] [place] [address]   [place] [address]    [place] [address]  Phone: [phone]                    Phone: [phone]     Phone: [phone]                      Monday 8:00-6:00                          Monday 8:00-6:00                          Monday 8:00-6:00              Tuesday 8:00-6:00                          Tuesday 8:00-6:00                          Tuesday 8:00-6:00              Wednesday 8:00-6:00                  Wednesday 8:00-6:00                   Wednesday 8:00-6:00      Thursday 8:00-6:00                        Thursday 8:00-6:00                         Thursday 8:00-6:00            Friday 8:00-5:00                              Friday 8:00-5:00                               Friday 8:00-5:00    [facility] Hours:   [address] [place] [address]  [phone]    Monday 9:00-6:00  Tuesday 9:00-6:00  Wednesday 9:00-6:00  Thursday 9:00-6:00  Friday 9:00-5:00  Please log on to [employer] to order your contact lenses.  The link is found on the [facility] page.  As always, Thank you for trusting us with your health care needs!    There is a combination of three treatments which can greatly improve symptoms of dry eyes.     Artificial tears  Heat (eyes closed)  Eyelid and eyelash cleansing (eyes closed)     Use one drop of artificial tears both eyes 4 x daily.  Once in the morning, lunch, dinner and bedtime. Continue to use the drops regardless if your eyes are comfortable or not.  Artificial tears work best as a preventative and not as well after your eyes are starting to bother you.  It may take 4- 6 weeks of using the drops before you notice improvement.  If after that time you are still having problems schedule an appointment for an evaluation and discussion of different treatments such as Restasis or Xiidra.  Dry eyes are a chronic condition and you may have more symptoms at certain times of the year.    Excess tearing can be due to the right tears not working properly or a blockage in the tear drainage system.  You can try using artificial tears 1 drop both eyes 4 x day.  If the excess tearing is bothersome after 4-6 weeks of treatment then we can send you for further testing.  This would entail a referral to our oculoplastic specialist Dr. Man Clemens at the [facility]-[phone].    Recommended brands are:    Systane Complete  Systane Ultra  Systane Balance  Refresh Advanced Optive  Refresh Relieva  Blink    Recommended brands for contact lens wearers are:    Systane contacts  Refresh contacts  Blink contacts    If you are using drops more than 4 x day or have sensitivities to preservatives I recommend non preserved  artificial tears.  These come in 1 use vials.  They can be used every 1-2 hours.  Do not reuse the vials.    Recommended brands are:    Refresh Optive Jourdan-3  Systane- preservative free  Refresh-  preservative free  Blink- preservative free    Gels or ointment can be used at night.    Recommended brands are:    Systane Gel  Refresh Gel  Blink Gel  Genteal Gel    Systane night time (ointment)  Refresh Celluvisc  Refresh PM (ointment)      Visine, Clear Eyes or Murine (drops that get the red out) can irritate the eyes and cause a rebound effect where the eyes become more red and you end up using more drops.  Avoid drops containing tetrahydrozoline, naphazoline, phenylephrine, oxymetazoline.      OTC Lumify is a newer product that gives immediate redness relief without the rebound effect.  Use as needed to take the redness out.    Artificial tears may be used with other drops (such as allergy, glaucoma, antibiotics) around the same time.  Be sure to wait 5 minutes in between drops.    Heat to the eyelids can also improve your symptoms of dry eyes.  Heath heat masks can be purchased at Amazon to be used nightly for 10-15 minutes.  Other options are gel masks that can be put in the microwave and purchased at most pharmacies.      Tea Tree Oil eyelid cleansers recommended are Ocusoft Oust foam cleanser to cleanse eyelids/lashes at night and in the am. Other options are Blephadex or Cliradex eyelid wipes.  KEEP EYES CLOSED when using these products.  These can be purchased on amazon.com   A good product for make up remover with tea tree oil is [employer]quipR[employer].com.  This can be found at www.Razume or Renrendai.    Other good eyelid cleansers have hypochlorous which removes excess bacteria and is safe around the eyes. Products are Avenova, Ocusoft Hypochlor or Heyedrate. Spray solution onto cotton pad, close eyes and gently apply to eyelids and eyelashes using side to side motion.  You can also KEEP EYES CLOSED spray and  rub into eyelashes.  You do not need to rinse it off. Use morning and evening. These products can be found on Amazon.  You can check with your local pharmacy and see if they can order if for you if they don't have it.    Other brands of eyelid cleansing wipes are:    Ocusoft wipes  Systane wipes    A great eye make up line is [email or web address]

## 2022-09-19 NOTE — PROGRESS NOTES
Chief Complaint   Patient presents with     Annual Eye Exam         Last Eye Exam: 9-  Dilated Previously: Yes    What are you currently using to see?  glasses       Distance Vision Acuity: Noticed gradual change in both eyes    Near Vision Acuity: Not satisfied     Eye Comfort: it depends on the day,dry, itchy   Do you use eye drops? :  not really  Occupation or Hobbies: commercial cleaning     Trini Browne Optometric Assistant, A.B.O.C.          Medical, surgical and family histories reviewed and updated 9/19/2022.       OBJECTIVE: See Ophthalmology exam    ASSESSMENT:    ICD-10-CM    1. Examination of eyes and vision  Z01.00 EYE EXAM (SIMPLE-NONBILLABLE)   2. Dry eye syndrome of both eyes  H04.123 EYE EXAM (SIMPLE-NONBILLABLE)     polyethylene glycol-propylene glycol (SYSTANE ULTRA) 0.4-0.3 % SOLN ophthalmic solution   3. Allergic conjunctivitis of both eyes  H10.13 EYE EXAM (SIMPLE-NONBILLABLE)     olopatadine (PATADAY) 0.2 % ophthalmic solution   4. Hyperopia, bilateral  H52.03 REFRACTION       PLAN:     Patient Instructions   Systane Ultra 1 drop both eyes 2-4 x day.    Ocusoft lid scrubs at night.     Heat to the eyes daily for 10-15 minutes nightly with warm washcloth or reusable gel masks from the pharmacy or  [employer] heat masks can be purchased at Amazon.    Cold packs for itchy eyes.    Pataday to be used once daily for itchy eyes.  Use as needed. Contact your pharmacy for refills.    Eyeglass prescription given.  No change in eyeglass prescription.    Return in 1 year for a complete eye exam or sooner if needed.    Jarret Matson, OD

## 2022-09-19 NOTE — LETTER
9/19/2022         RE: Cruz Obregon  [address]  [facility] [address]        Dear Colleague,    Thank you for referring your patient, Cruz Obregon, to the [facility]. Please see a copy of my visit note below.    Chief Complaint   Patient presents with     Annual Eye Exam         Last Eye Exam: 9-  Dilated Previously: Yes    What are you currently using to see?  glasses       Distance Vision Acuity: Noticed gradual change in both eyes    Near Vision Acuity: Not satisfied     Eye Comfort: it depends on the day,dry, itchy   Do you use eye drops? :  not really  Occupation or Hobbies: commercial cleaning     Trini Browne Optometric Assistant, A.B.O.C.          Medical, surgical and family histories reviewed and updated 9/19/2022.       OBJECTIVE: See Ophthalmology exam    ASSESSMENT:    ICD-10-CM    1. Examination of eyes and vision  Z01.00 EYE EXAM (SIMPLE-NONBILLABLE)   2. Dry eye syndrome of both eyes  H04.123 EYE EXAM (SIMPLE-NONBILLABLE)     polyethylene glycol-propylene glycol (SYSTANE ULTRA) 0.4-0.3 % SOLN ophthalmic solution   3. Allergic conjunctivitis of both eyes  H10.13 EYE EXAM (SIMPLE-NONBILLABLE)     olopatadine (PATADAY) 0.2 % ophthalmic solution   4. Hyperopia, bilateral  H52.03 REFRACTION       PLAN:     Patient Instructions   Systane Ultra 1 drop both eyes 2-4 x day.    Ocusoft lid scrubs at night.     Heat to the eyes daily for 10-15 minutes nightly with warm washcloth or reusable gel masks from the pharmacy or  [employer] heat masks can be purchased at Amazon.    Cold packs for itchy eyes.    Pataday to be used once daily for itchy eyes.  Use as needed. Contact your pharmacy for refills.    Eyeglass prescription given.  No change in eyeglass prescription.    Return in 1 year for a complete eye exam or sooner if needed.    Jarret Matson, OD               Again, thank you for allowing me to participate in the care of your patient.         Sincerely,        Jarret Matson, OD

## 2022-09-30 ENCOUNTER — OFFICE VISIT (OUTPATIENT)
Dept: ORTHOPEDICS | Facility: CLINIC | Age: 37
End: 2022-09-30
Attending: FAMILY MEDICINE

## 2022-09-30 ENCOUNTER — ANCILLARY PROCEDURE (OUTPATIENT)
Dept: GENERAL RADIOLOGY | Facility: CLINIC | Age: 37
End: 2022-09-30
Attending: PEDIATRICS
Payer: COMMERCIAL

## 2022-09-30 VITALS
WEIGHT: 145 LBS | BODY MASS INDEX: 21.98 KG/M2 | SYSTOLIC BLOOD PRESSURE: 120 MMHG | DIASTOLIC BLOOD PRESSURE: 60 MMHG | HEIGHT: 68 IN

## 2022-09-30 DIAGNOSIS — M25.511 RIGHT SHOULDER PAIN, UNSPECIFIED CHRONICITY: ICD-10-CM

## 2022-09-30 DIAGNOSIS — M25.511 RIGHT SHOULDER PAIN, UNSPECIFIED CHRONICITY: Primary | ICD-10-CM

## 2022-09-30 DIAGNOSIS — R20.2 NUMBNESS AND TINGLING IN LEFT ARM: ICD-10-CM

## 2022-09-30 DIAGNOSIS — R20.0 NUMBNESS AND TINGLING IN LEFT ARM: ICD-10-CM

## 2022-09-30 PROCEDURE — 73030 X-RAY EXAM OF SHOULDER: CPT | Mod: TC | Performed by: RADIOLOGY

## 2022-09-30 PROCEDURE — 99244 OFF/OP CNSLTJ NEW/EST MOD 40: CPT | Performed by: PEDIATRICS

## 2022-09-30 NOTE — LETTER
9/30/2022         RE: Cruz Obregon  [address]  [facility] [address]        Dear Colleague,    Thank you for referring your patient, Cruz Obregon, to the [facility]. Please see a copy of my visit note below.    ASSESSMENT & PLAN    Cruz was seen today for numbness.    Diagnoses and all orders for this visit:    Right shoulder pain, unspecified chronicity  -     XR Shoulder Right G/E 3 Views; Future    Numbness and tingling in left arm  -     Orthopedic  Referral  -     Wrist/Arm/Hand Supplies Order for DME - ONLY FOR DME      Proceed with PT as planned.  Discussed trial nighttime wrist bracing, wrist in neutral position.  Questions answered. Discussed signs and symptoms that may indicate more serious issues; the patient was instructed to seek appropriate care if noted. Domitila indicates understanding of these issues and agrees with the plan.        See Patient Instructions  Patient Instructions   Right hand symptoms are suspicious for at least some component carpal tunnel syndrome, given overall course, and exam findings today.  Shoulder area pain favored to be more related to repetitive motions, overuse.  Overall symptoms in the right upper extremity do not clearly appear to be related to the cervical spine, especially with recent MRI.  Reviewed options with additional imaging, physical therapy, use of medications for symptoms, electrodiagnostic testing, and use of support.  Plan physical therapy next, proceed as already scheduled.  Discussed nighttime wrist bracing.  Brace option reviewed today.  Plan to monitor course with therapy and bracing over the next 1 month to start.  If persistent issues during that time, or if any worsening or changing symptoms in the meantime, contact clinic.      If you have any further questions for your physician or physician s care team you can call [phone] and use option 3 to leave a voice message. Calls  "received during business hours will be returned same day.        DO SYDNEY Scherer [facility]      CC: Lisa Carney      -----  Chief Complaint   Patient presents with     Right Arm - Numbness       SUBJECTIVE  Surzoila SYDNEY Obregon is a/an 37 year old female who is seen in consultation at the request of  Lisa Carney M.D. for evaluation of bilateral arm pain.  Had a small infection in her right small finger after she jammed it at work; she removed her acrylic nail and pushed out the pus.  The following day, she had pain and swelling in her arm.  Has continued to have pain and feeling in her arms.   Numbness and tingling in her right arm.  Feels it is her entire hand and arm.  She now feels it in her left arm slightly.  Has an aching in her arms as well.   Does now also feel it in the balls of her feet.   Feels the numbness has subsided, but continues to have an ache in both arms.  Pain with pressure on either arm.  Does have pain in her shoulder with ROM.   Did recently have an MRI, but no imaging of her shoulders        The patient is seen by themselves.  The patient is Right handed    Onset: 2-3 month(s) ago. describes paronychia of small finger   Location of Pain: right upper extremity   Worsened by: movement, use of arms   Better with: nothing   Treatments tried: ibuprofen  Associated symptoms: numbness, tingling and weakness of arm     Orthopedic/Surgical history: NO  Social History/Occupation: commercial cleaning     No family history pertinent to patient's problem today.     **  Currently has pain , is constant at right anterior shoulder. Also has constant aching, pain in right elbow.  Initially would awake to \"dead arm.\" now notes that may have some numbness due to resting on right side.  Notes repetitive motion with right UE, e.g., use of vacuum, gets pain.  No longer waking with symptoms in right UE, no longer waking with " "numbness.    Initially was waking with right hand numbness, would take a few hours for improvement. No longer with that, also no longer with aching in hand that was there previously.  However, still notes with motion in right long finger feels something, diffuse in right forearm, to hand. More of a pulling sensation.  No numbness during the day.  Numbness has been present since about 1 week after initial UC visit.    **  No personal hx of inflammatory/autoimmune condition. Recalls having test for that years ago.  Extended family hx of lupus, distant cousin.        REVIEW OF SYSTEMS:  Review of Systems   All other systems reviewed and are negative.        OBJECTIVE:  /60   Ht 1.721 m (5' 7.75\")   Wt 65.8 kg (145 lb)   BMI 22.21 kg/m     General: healthy, alert and in no distress  HEENT: no scleral icterus or conjunctival erythema  Skin: no suspicious lesions or rash. No jaundice.  CV: distal perfusion intact   Resp: normal respiratory effort without conversational dyspnea   Psych: normal mood and affect  Gait: NORMAL  Neuro: Normal light sensory exam of extremity       Cervical Spine Exam    Range of Motion:       forward flexion          extension         lateral flexion   Grossly full, some right side stretching with leftward bending    Strength: UE grossly intact, symmetric    Sensation:     grossly intact througout bilateral upper extremities    Special Tests:     neg (-) Spurling    Skin:     well perfused       capillary refill brisk    Lymphatics:      no edema noted in the upper extremities           Bilateral Shoulder exam    ROM:      forward flexion grossly full, some increase in pain right shoulder, diffuse        abduction grossly full left, lacking few deg right compared to left; pain on right at shoulder level and above       internal rotation grossly intact, pain L > R       external rotation grossly intact, symmetric, mild right side pain    Strength:      abduction        internal rotation "        external rotation   Grossly symmetric    Impingement testing:      Some pain left with South       positive (+) empty can for some symptoms shoulder and arm bilat      Right Elbow exam:    Inspection:     no ecchymosis       no edema or effusion    ROM:     full with flexion, extension, forearm supination and pronation.    Tinel neg cubital tunnel      Hand/wrist (right):    Inspection:  No deformity noted.  No swelling. No ecchymosis.    Motion:  Wrist, digits grossly full    Strength:  , finger abduction grossly intact    Radial pulses normal, +2/4, capillary refill brisk.      Tinel positive right. Phalen some right hand tingling. Reverse Phalen with some right wrist pain, no change in N/T.        RADIOLOGY:  I independently ordered, visualized and reviewed these images with the patient  No acute bony abnormality at shoulder.      EXAM: XR SHOULDER RIGHT G/E 3 VIEWS  DATE/TIME: 9/30/2022 3:51 PM      INDICATION: Right shoulder pain.  COMPARISON: None.                                                                      IMPRESSION: Normal joint spacing and alignment.  No fracture.     LEYLA COLEMAN MD       ========================  MRI visualized/reviewed:  No significant stenosis to cause UE symptoms.          Results for orders placed or performed in visit on 09/01/22   MR Cervical Spine w/o Contrast    Narrative    MR CERVICAL SPINE W/O CONTRAST 9/1/2022 10:38 AM    Provided History: Numbness and tingling in left arm; Numbness and  tingling in left arm  ICD-10: Numbness and tingling in left arm; Numbness and tingling in  left arm    Comparison: No similar studies    Technique: Sagittal T1-weighted, sagittal T2-weighted, sagittal STIR,  sagittal gradient echo, sagittal diffusion-weighted (with ADC map),  axial T2-weighted, and axial T2* gradient echo images of the cervical  spine were obtained without intravenous contrast.    Findings:  The cervical vertebrae are normally aligned.  There is no  disc height  narrowing at any level.  There is normal signal within and normal  contour of the cervical spinal cord.  The findings on a level by level  basis are as follows:    C2-3:  No spinal canal or neural foraminal stenosis.    C3-4:  No spinal canal or neural foraminal stenosis.    C4-5:  No spinal canal or neural foraminal stenosis.    C5-6:  No spinal canal or neural foraminal stenosis.    C6-7:  No spinal canal or neural foraminal stenosis.    C7-T1:  No spinal canal or neural foraminal stenosis.     No abnormality of the paraspinous soft tissues.      Impression    Impression: Normal MRI of the cervical spine.    ELTON ROCHA MD         SYSTEM ID:  [id number]           Review of prior external note(s) from - previous eval  Review of the result(s) of each unique test - imaging  Independent interpretation of a test performed by another physician/other qualified health care professional (not separately reported) - imaging  34 minutes spent on the date of the encounter doing chart review, history and exam, documentation and further activities per the note           Again, thank you for allowing me to participate in the care of your patient.        Sincerely,        Marcin Camargo DO

## 2022-09-30 NOTE — PROGRESS NOTES
ASSESSMENT & PLAN    Cruz was seen today for numbness.    Diagnoses and all orders for this visit:    Right shoulder pain, unspecified chronicity  -     XR Shoulder Right G/E 3 Views; Future    Numbness and tingling in left arm  -     Orthopedic  Referral  -     Wrist/Arm/Hand Supplies Order for DME - ONLY FOR DME      Proceed with PT as planned.  Discussed trial nighttime wrist bracing, wrist in neutral position.  Questions answered. Discussed signs and symptoms that may indicate more serious issues; the patient was instructed to seek appropriate care if noted. Domitila indicates understanding of these issues and agrees with the plan.        See Patient Instructions  Patient Instructions   Right hand symptoms are suspicious for at least some component carpal tunnel syndrome, given overall course, and exam findings today.  Shoulder area pain favored to be more related to repetitive motions, overuse.  Overall symptoms in the right upper extremity do not clearly appear to be related to the cervical spine, especially with recent MRI.  Reviewed options with additional imaging, physical therapy, use of medications for symptoms, electrodiagnostic testing, and use of support.  Plan physical therapy next, proceed as already scheduled.  Discussed nighttime wrist bracing.  Brace option reviewed today.  Plan to monitor course with therapy and bracing over the next 1 month to start.  If persistent issues during that time, or if any worsening or changing symptoms in the meantime, contact clinic.      If you have any further questions for your physician or physician s care team you can call [phone] and use option 3 to leave a voice message. Calls received during business hours will be returned same day.        Marcin Camargo [facility]      CC: Lisa Sanchezming      -----  Chief Complaint   Patient presents with     Right Arm - Numbness  "      SUBJECTIVE  Surlaina SYDNEY Obregon is a/an 37 year old female who is seen in consultation at the request of  Lisa Carney M.D. for evaluation of bilateral arm pain.  Had a small infection in her right small finger after she jammed it at work; she removed her acrylic nail and pushed out the pus.  The following day, she had pain and swelling in her arm.  Has continued to have pain and feeling in her arms.   Numbness and tingling in her right arm.  Feels it is her entire hand and arm.  She now feels it in her left arm slightly.  Has an aching in her arms as well.   Does now also feel it in the balls of her feet.   Feels the numbness has subsided, but continues to have an ache in both arms.  Pain with pressure on either arm.  Does have pain in her shoulder with ROM.   Did recently have an MRI, but no imaging of her shoulders        The patient is seen by themselves.  The patient is Right handed    Onset: 2-3 month(s) ago. describes paronychia of small finger   Location of Pain: right upper extremity   Worsened by: movement, use of arms   Better with: nothing   Treatments tried: ibuprofen  Associated symptoms: numbness, tingling and weakness of arm     Orthopedic/Surgical history: NO  Social History/Occupation: commercial cleaning     No family history pertinent to patient's problem today.     **  Currently has pain , is constant at right anterior shoulder. Also has constant aching, pain in right elbow.  Initially would awake to \"dead arm.\" now notes that may have some numbness due to resting on right side.  Notes repetitive motion with right UE, e.g., use of vacuum, gets pain.  No longer waking with symptoms in right UE, no longer waking with numbness.    Initially was waking with right hand numbness, would take a few hours for improvement. No longer with that, also no longer with aching in hand that was there previously.  However, still notes with motion in right long finger feels something, diffuse " "in right forearm, to hand. More of a pulling sensation.  No numbness during the day.  Numbness has been present since about 1 week after initial UC visit.    **  No personal hx of inflammatory/autoimmune condition. Recalls having test for that years ago.  Extended family hx of lupus, distant cousin.        REVIEW OF SYSTEMS:  Review of Systems   All other systems reviewed and are negative.        OBJECTIVE:  /60   Ht 1.721 m (5' 7.75\")   Wt 65.8 kg (145 lb)   BMI 22.21 kg/m     General: healthy, alert and in no distress  HEENT: no scleral icterus or conjunctival erythema  Skin: no suspicious lesions or rash. No jaundice.  CV: distal perfusion intact   Resp: normal respiratory effort without conversational dyspnea   Psych: normal mood and affect  Gait: NORMAL  Neuro: Normal light sensory exam of extremity       Cervical Spine Exam    Range of Motion:       forward flexion          extension         lateral flexion   Grossly full, some right side stretching with leftward bending    Strength: UE grossly intact, symmetric    Sensation:     grossly intact througout bilateral upper extremities    Special Tests:     neg (-) Spurling    Skin:     well perfused       capillary refill brisk    Lymphatics:      no edema noted in the upper extremities           Bilateral Shoulder exam    ROM:      forward flexion grossly full, some increase in pain right shoulder, diffuse        abduction grossly full left, lacking few deg right compared to left; pain on right at shoulder level and above       internal rotation grossly intact, pain L > R       external rotation grossly intact, symmetric, mild right side pain    Strength:      abduction        internal rotation        external rotation   Grossly symmetric    Impingement testing:      Some pain left with South       positive (+) empty can for some symptoms shoulder and arm bilat      Right Elbow exam:    Inspection:     no ecchymosis       no edema or effusion    ROM:  "    full with flexion, extension, forearm supination and pronation.    Tinel neg cubital tunnel      Hand/wrist (right):    Inspection:  No deformity noted.  No swelling. No ecchymosis.    Motion:  Wrist, digits grossly full    Strength:  , finger abduction grossly intact    Radial pulses normal, +2/4, capillary refill brisk.      Tinel positive right. Phalen some right hand tingling. Reverse Phalen with some right wrist pain, no change in N/T.        RADIOLOGY:  I independently ordered, visualized and reviewed these images with the patient  No acute bony abnormality at shoulder.      EXAM: XR SHOULDER RIGHT G/E 3 VIEWS  DATE/TIME: 9/30/2022 3:51 PM      INDICATION: Right shoulder pain.  COMPARISON: None.                                                                      IMPRESSION: Normal joint spacing and alignment.  No fracture.     LEYLA COLEMAN MD       ========================  MRI visualized/reviewed:  No significant stenosis to cause UE symptoms.          Results for orders placed or performed in visit on 09/01/22   MR Cervical Spine w/o Contrast    Narrative    MR CERVICAL SPINE W/O CONTRAST 9/1/2022 10:38 AM    Provided History: Numbness and tingling in left arm; Numbness and  tingling in left arm  ICD-10: Numbness and tingling in left arm; Numbness and tingling in  left arm    Comparison: No similar studies    Technique: Sagittal T1-weighted, sagittal T2-weighted, sagittal STIR,  sagittal gradient echo, sagittal diffusion-weighted (with ADC map),  axial T2-weighted, and axial T2* gradient echo images of the cervical  spine were obtained without intravenous contrast.    Findings:  The cervical vertebrae are normally aligned.  There is no disc height  narrowing at any level.  There is normal signal within and normal  contour of the cervical spinal cord.  The findings on a level by level  basis are as follows:    C2-3:  No spinal canal or neural foraminal stenosis.    C3-4:  No spinal canal or neural  foraminal stenosis.    C4-5:  No spinal canal or neural foraminal stenosis.    C5-6:  No spinal canal or neural foraminal stenosis.    C6-7:  No spinal canal or neural foraminal stenosis.    C7-T1:  No spinal canal or neural foraminal stenosis.     No abnormality of the paraspinous soft tissues.      Impression    Impression: Normal MRI of the cervical spine.    ELTON ROCHA MD         SYSTEM ID:  [id number]           Review of prior external note(s) from - previous eval  Review of the result(s) of each unique test - imaging  Independent interpretation of a test performed by another physician/other qualified health care professional (not separately reported) - imaging  34 minutes spent on the date of the encounter doing chart review, history and exam, documentation and further activities per the note

## 2022-10-03 ENCOUNTER — THERAPY VISIT (OUTPATIENT)
Dept: PHYSICAL THERAPY | Facility: CLINIC | Age: 37
End: 2022-10-03
Payer: COMMERCIAL

## 2022-10-03 ENCOUNTER — APPOINTMENT (OUTPATIENT)
Dept: OPTOMETRY | Facility: CLINIC | Age: 37
End: 2022-10-03
Payer: COMMERCIAL

## 2022-10-03 DIAGNOSIS — M79.621 PAIN OF RIGHT UPPER ARM: Primary | ICD-10-CM

## 2022-10-03 PROCEDURE — 97161 PT EVAL LOW COMPLEX 20 MIN: CPT | Mod: GP | Performed by: PHYSICAL THERAPIST

## 2022-10-03 PROCEDURE — 92341 FIT SPECTACLES BIFOCAL: CPT | Performed by: OPTOMETRIST

## 2022-10-03 PROCEDURE — 97110 THERAPEUTIC EXERCISES: CPT | Mod: GP | Performed by: PHYSICAL THERAPIST

## 2022-10-03 NOTE — PROGRESS NOTES
[facility]    OUTPATIENT Physical Therapy ORTHOPEDIC EVALUATION  PLAN OF TREATMENT FOR OUTPATIENT REHABILITATION  (COMPLETE FOR INITIAL CLAIMS ONLY)  Patient's Last Name, First Name, M.I.  YOB: 1985  Cruz Obregon    Provider s Name:  SYDNEY [facility]   Medical Record No.  [id number]   Start of Care Date:  10/03/22   Onset Date:   07/01/22   Type:     _X__PT   ___OT Medical Diagnosis:    Encounter Diagnosis   Name Primary?    Pain of right upper arm Yes        Treatment Diagnosis:  R shoulder and arm pain, L arm pain        Goals:     10/03/22 0500   Body Part   Goals listed below are for R shoulder/arm pain and L arm pain   Goal #1   Goal #1 lifting/carrying   Previous Functional Level No restrictions   Current Functional Level Cannot use arm repetitively or for prolonged time    Performance level <30 minutes before pain begins   STG Target Performance Repetitive or prolonged use of arm at shoulder height for   Performance level 30 minutes per day with <4/10 R arm pain   Rationale for safe care of infant/toddler;for meal preparation;for housework such as laundry, emptying garbage, use of ;for yard work such as shoveling snow;to perform job duties at work;for grocery shopping   Due date 10/31/22   LTG Target Performance Repetitive or prolonged use of arm overhead for;Repetitive or prolonged use of arm at should height for   Performance Level >45 minutes per day with <2/10 R arm pain   Rationale for safe care of infant/toddler;for grocery shopping;for meal preparation;for housework such as laundry, emptying garbage, use of ;for yard work such as shoveling snow;to perform job duties at work   Due date 11/28/22       Therapy Frequency:  1x/week  Predicted Duration of Therapy Intervention:  8 weeks    Santiago Green, PT                 I CERTIFY  THE NEED FOR THESE SERVICES FURNISHED UNDER        THIS PLAN OF TREATMENT AND WHILE UNDER MY CARE     (Physician co-signature of this document indicates review and certification of the therapy plan).                     Certification Date From:  10/03/22   Certification Date To:  11/28/22    Referring Provider:  Lisa JOSE*    Initial Assessment        See Epic Evaluation SOC Date: 10/03/22

## 2022-10-03 NOTE — PROGRESS NOTES
Physical Therapy Initial Evaluation  Subjective:  The history is provided by the patient. No  was used.   Patient Health History  Cruz Obregon being seen for Pain in both arms but primarily R shoulder and distal left arm. Initially had an infection in her left pinky that causes some numbness for a period of time. Over time, she developed bilateral arm and shoulder pain with decreasing numbness. She wore a brace for her right wrist for sleeping which she felt made her pain much worse..     Problem began: 7/1/2022.   Problem occurred: Not sure   Pain is reported as 3/10 on pain scale.  General health as reported by patient is good.  Pertinent medical history includes: migraines/headaches and numbness/tingling. Other medical history details: heart murmur.   Red flags:  Severe headaches and other.     Surgeries include:  Other. Other surgery history details: breast augmentation.    Current medications:  Sleep medication.    Current occupation is Commercial cleaning.   Primary job tasks include:  Pushing/pulling, repetitive tasks and prolonged standing.                  Therapist Generated HPI Evaluation  Problem details: Patient with no history of carpal tunnel or wrist, elbow injuries recently. She broke both wrists back in high school.    Patient reports minimal to no neck pain currently or recently.    Patient reports chronic headaches/migraines that are very sensory-aggravated.    Pain into L wrist not nearly as bad as a few months ago, R shoulder primary concern today.         Type of problem:  Right shoulder.    This is a chronic condition.  Condition occurred with:  Unknown cause (onset coincided with infection in L pinky).  Where condition occurred: for unknown reasons.  Patient reports pain:  Lateral and anterior (throughout entire arm into wrist and hand).  Pain is described as aching (numbness, heaviness, tingling) and is intermittent.  Pain radiates to:  Elbow, lower arm, wrist and  "hand. Pain is worse in the A.M. (and with certain motions).  Since onset symptoms are unchanged (numbness/tingling better, shoulder pain worse).  Associated symptoms:  Dislocating/subluxing, loss of motion/stiffness, painful arc, loss of strength and tingling. Symptoms are exacerbated by using arm overhead, using arm at shoulder level, lying on extremity, lifting, carrying and using arm behind back (repeated motions such as vacuuming)  Relieved by: \"nothing really\"  Special tests included:  X-ray and MRI (R shoulder: Normal joint spacing and alignment.  No fracture. MRI: normal for cervical spine).  Past treatment: none. There was none improvement following previous treatment.  Restrictions due to condition include:  Working in normal job without restrictions.  Barriers include:  None as reported by patient.                        Objective:  System                   Shoulder Evaluation:  ROM:  AROM:    Flexion:  Right:  Full AROM but painful last 50% of flexion and abduction on R arm    Abduction:  Right:  Full AROM but painful last 50% of flexion and abduction on R arm    Internal Rotation:  Right:  Base of scapla, painful  External Rotation:  Right:  70, painful at end range                      Strength:    Flexion: Right: 5-/5      Pain:  +  Extension:  Right: 5-/5    Pain:  Abduction:  Right: 4/5      Pain:++    Internal Rotation:  Right: 4/5      Pain:++  External Rotation:   Right:4/5      Pain:++            Stability Testing:  Stability testing shoulder: - Neer's, - HK, - Adson's.      Right shoulder stability negative testing:  Apprehension and Relocation    Palpation:  Palpation assessed shoulder: + R UT, R biceps muscle belly, groove and tendon, R anterior deltoid,                                         General     ROS    Assessment/Plan:    Patient is a 37 year old female with right side shoulder and arm complaints.    Patient has the following significant findings with corresponding treatment plan.    "             Diagnosis 1:  R shoulder and arm pain  Pain -  hot/cold therapy, manual therapy, self management, education, directional preference exercise and home program  Decreased ROM/flexibility - manual therapy, therapeutic exercise and home program  Decreased joint mobility - manual therapy, therapeutic exercise and home program  Decreased strength - therapeutic exercise, therapeutic activities and home program  Impaired muscle performance - neuro re-education and home program  Decreased function - therapeutic activities and home program  Impaired posture - neuro re-education and home program    Therapy Evaluation Codes:   Cumulative Therapy Evaluation is: Low complexity.    Previous and current functional limitations:  (See Goal Flow Sheet for this information)    Short term and Long term goals: (See Goal Flow Sheet for this information)     Communication ability:  Patient appears to be able to clearly communicate and understand verbal and written communication and follow directions correctly.  Treatment Explanation - The following has been discussed with the patient:   RX ordered/plan of care  Anticipated outcomes  Possible risks and side effects  This patient would benefit from PT intervention to resume normal activities.   Rehab potential is good.    Frequency:  1 X week, once daily  Duration:  for 8 weeks  Discharge Plan:  Achieve all LTG.  Independent in home treatment program.  Reach maximal therapeutic benefit.    Please refer to the daily flowsheet for treatment today, total treatment time and time spent performing 1:1 timed codes.

## 2022-10-11 ENCOUNTER — THERAPY VISIT (OUTPATIENT)
Dept: PHYSICAL THERAPY | Facility: CLINIC | Age: 37
End: 2022-10-11
Payer: COMMERCIAL

## 2022-10-11 DIAGNOSIS — M79.621 PAIN OF RIGHT UPPER ARM: Primary | ICD-10-CM

## 2022-10-11 PROCEDURE — 97140 MANUAL THERAPY 1/> REGIONS: CPT | Mod: GP | Performed by: PHYSICAL THERAPIST

## 2022-10-11 PROCEDURE — 97110 THERAPEUTIC EXERCISES: CPT | Mod: GP | Performed by: PHYSICAL THERAPIST

## 2022-10-11 PROCEDURE — 97112 NEUROMUSCULAR REEDUCATION: CPT | Mod: GP | Performed by: PHYSICAL THERAPIST

## 2022-10-19 ENCOUNTER — THERAPY VISIT (OUTPATIENT)
Dept: PHYSICAL THERAPY | Facility: CLINIC | Age: 37
End: 2022-10-19
Payer: COMMERCIAL

## 2022-10-19 DIAGNOSIS — M79.621 PAIN OF RIGHT UPPER ARM: Primary | ICD-10-CM

## 2022-10-19 PROCEDURE — 97112 NEUROMUSCULAR REEDUCATION: CPT | Mod: GP | Performed by: PHYSICAL THERAPIST

## 2022-10-19 PROCEDURE — 97110 THERAPEUTIC EXERCISES: CPT | Mod: GP | Performed by: PHYSICAL THERAPIST

## 2022-11-15 ENCOUNTER — THERAPY VISIT (OUTPATIENT)
Dept: PHYSICAL THERAPY | Facility: CLINIC | Age: 37
End: 2022-11-15
Payer: COMMERCIAL

## 2022-11-15 DIAGNOSIS — M79.621 PAIN OF RIGHT UPPER ARM: Primary | ICD-10-CM

## 2022-11-15 PROCEDURE — 97110 THERAPEUTIC EXERCISES: CPT | Mod: GP | Performed by: PHYSICAL THERAPIST

## 2022-11-15 PROCEDURE — 97140 MANUAL THERAPY 1/> REGIONS: CPT | Mod: GP | Performed by: PHYSICAL THERAPIST

## 2022-11-15 PROCEDURE — 97012 MECHANICAL TRACTION THERAPY: CPT | Mod: GP | Performed by: PHYSICAL THERAPIST

## 2022-11-21 ENCOUNTER — HEALTH MAINTENANCE LETTER (OUTPATIENT)
Age: 37
End: 2022-11-21

## 2022-11-22 ENCOUNTER — THERAPY VISIT (OUTPATIENT)
Dept: PHYSICAL THERAPY | Facility: CLINIC | Age: 37
End: 2022-11-22
Payer: COMMERCIAL

## 2022-11-22 ENCOUNTER — ANCILLARY PROCEDURE (OUTPATIENT)
Dept: CARDIOLOGY | Facility: CLINIC | Age: 37
End: 2022-11-22
Attending: INTERNAL MEDICINE
Payer: COMMERCIAL

## 2022-11-22 DIAGNOSIS — M79.621 PAIN OF RIGHT UPPER ARM: Primary | ICD-10-CM

## 2022-11-22 DIAGNOSIS — I07.1 TRICUSPID INSUFFICIENCY: ICD-10-CM

## 2022-11-22 DIAGNOSIS — I34.0 MITRAL INSUFFICIENCY: ICD-10-CM

## 2022-11-22 LAB — LVEF ECHO: NORMAL

## 2022-11-22 PROCEDURE — 97012 MECHANICAL TRACTION THERAPY: CPT | Mod: GP | Performed by: PHYSICAL THERAPIST

## 2022-11-22 PROCEDURE — 97110 THERAPEUTIC EXERCISES: CPT | Mod: GP | Performed by: PHYSICAL THERAPIST

## 2022-11-22 PROCEDURE — 93306 TTE W/DOPPLER COMPLETE: CPT | Performed by: STUDENT IN AN ORGANIZED HEALTH CARE EDUCATION/TRAINING PROGRAM

## 2022-11-22 PROCEDURE — 97140 MANUAL THERAPY 1/> REGIONS: CPT | Mod: GP | Performed by: PHYSICAL THERAPIST

## 2022-11-28 DIAGNOSIS — F51.01 PRIMARY INSOMNIA: ICD-10-CM

## 2022-11-28 RX ORDER — ZOLPIDEM TARTRATE 5 MG/1
5 TABLET ORAL
Qty: 30 TABLET | Refills: 3 | Status: SHIPPED | OUTPATIENT
Start: 2022-11-28 | End: 2023-04-18

## 2022-11-28 NOTE — TELEPHONE ENCOUNTER
Patient calling in requesting refill of Zolpidem (Ambien) 5 mg tablet.    Will route to refill pool to be reviewed.      Erum Huff, SAMANTHAN, RN  [facility]

## 2022-12-02 ENCOUNTER — THERAPY VISIT (OUTPATIENT)
Dept: PHYSICAL THERAPY | Facility: CLINIC | Age: 37
End: 2022-12-02
Payer: COMMERCIAL

## 2022-12-02 DIAGNOSIS — M79.621 PAIN OF RIGHT UPPER ARM: Primary | ICD-10-CM

## 2022-12-02 PROCEDURE — 97140 MANUAL THERAPY 1/> REGIONS: CPT | Mod: GP | Performed by: PHYSICAL THERAPIST

## 2022-12-02 PROCEDURE — 97110 THERAPEUTIC EXERCISES: CPT | Mod: GP | Performed by: PHYSICAL THERAPIST

## 2022-12-02 PROCEDURE — 97012 MECHANICAL TRACTION THERAPY: CPT | Mod: GP | Performed by: PHYSICAL THERAPIST

## 2022-12-04 NOTE — PROGRESS NOTES
Subjective:  HPI  Physical Exam                    Objective:  System    Physical Exam    General     ROS    Assessment/Plan:    PROGRESS  REPORT    Progress reporting period is from 10/3/2022 to 12/2/2022.       SUBJECTIVE  Subjective changes noted by patient: Patient reports overall the R neck and pain is less intense but she still has pain.  Patient reports still unable to lay on R side due to pain in the R neck and shoulder area. Patient reports increase R side neck, shoulder and arm pain the more she uses the R arm. Patient feels when she does the PT exercises the arm/neck actually feel worse for a short time but then the arm feels better later and into the next day.  Patient also has c/o  R wrist pain; this pain has been there since she started wearing the Wrist splint; pt no longer wears the splint due to pain.    Current pain level is : 6/10.     Previous pain level was  NA  .   Changes in function:  Yes, minimal changes  Adverse reaction to treatment or activity: activity - increase pain    OBJECTIVE  Changes noted in objective findings:     strength:  65# L ;  60# R   CROM: WNL; pulling back of neck into R shoulder    Isaak Shoulder AROM is WNL; pain reported throughout the movement.  R UE strength: 4/5 scaption/abd (pain); all other  directions good strength.     ASSESSMENT/PLAN  Updated problem list and treatment plan: Diagnosis 1:  R Shoulder/arm pain  Pain -  hot/cold therapy, US, mechanical traction and manual therapy  Decreased strength - therapeutic exercise and therapeutic activities  Decreased proprioception - neuro re-education and therapeutic activities  Impaired muscle performance - neuro re-education  Decreased function - therapeutic activities  Impaired posture - neuro re-education  STG/LTGs have been met or progress has been made towards goals:  Yes, minimal change  Assessment of Progress: The patient's progress has plateaued.  Self Management Plans:  Patient has been instructed in  a home treatment program.  I have re-evaluated this patient and find that the nature, scope, duration and intensity of the therapy is appropriate for the medical condition of the patient.  Cruz continues to require the following intervention to meet STG and LTG's:  PT    Recommendations:  This patient would benefit from further evaluation prior to return to PT.  Recommending follow up with either Primary MD or Orthopedic MD    This patient would benefit from continued therapy.     Frequency:  2 X a month, once daily  Duration:  for 2 months          Please refer to the daily flowsheet for treatment today, total treatment time and time spent performing 1:1 timed codes.

## 2022-12-04 NOTE — PROGRESS NOTES
[facility]    OUTPATIENT Physical Therapy ORTHOPEDIC EVALUATION  PLAN OF TREATMENT FOR OUTPATIENT REHABILITATION  (COMPLETE FOR INITIAL CLAIMS ONLY)  Patient's Last Name, First Name, M.I.  YOB: 1985  Cruz Obregon    Provider s Name:  [facility]   Medical Record No.  [id number]   Start of Care Date:  10/03/22   Onset Date:   07/01/22   Treatment Diagnosis:  R shoulder and arm pain, L arm pain Medical Diagnosis:  Pain of right upper arm       Goals:     12/02/22 0500   Body Part   Goals listed below are for R shoulder/arm pain and L arm pain   Goal #1   Goal #1 lifting/carrying   Previous Functional Level No restrictions   Current Functional Level Can use arm repetitively or for prolonged time    Performance level for 30'; 7-8/10PL  (no significant change)   STG Target Performance Repetitive or prolonged use of arm at shoulder height for   Performance level 30 minutes per day with <4/10 R arm pain   Rationale for safe care of infant/toddler;for meal preparation;for housework such as laundry, emptying garbage, use of ;for yard work such as shoveling snow;to perform job duties at work;for grocery shopping   Due date 11/29/22   If goal not met, Why? goal not met   LTG Target Performance Repetitive or prolonged use of arm overhead for;Repetitive or prolonged use of arm at should height for   Performance Level >45 minutes per day with <2/10 R arm pain   Rationale for safe care of infant/toddler;for grocery shopping;for meal preparation;for housework such as laundry, emptying garbage, use of ;for yard work such as shoveling snow;to perform job duties at work   Due date 12/30/22       Therapy Frequency:  2x/month  Predicted Duration of Therapy Intervention:  x2 months    Kiara Juan, PT                 I CERTIFY THE NEED FOR THESE SERVICES  FURNISHED UNDER        THIS PLAN OF TREATMENT AND WHILE UNDER MY CARE     (Physician attestation of this document indicates review and certification of the therapy plan).                     Certification Date From:  11/29/22   Certification Date To:  01/29/23    Referring Provider:  Lisa Carney MD    Initial Assessment        See Epic Evaluation SOC Date: 10/03/22

## 2022-12-05 ENCOUNTER — ALLIED HEALTH/NURSE VISIT (OUTPATIENT)
Dept: FAMILY MEDICINE | Facility: CLINIC | Age: 37
End: 2022-12-05
Payer: COMMERCIAL

## 2022-12-05 VITALS — SYSTOLIC BLOOD PRESSURE: 122 MMHG | WEIGHT: 143 LBS | BODY MASS INDEX: 21.9 KG/M2 | DIASTOLIC BLOOD PRESSURE: 78 MMHG

## 2022-12-05 DIAGNOSIS — Z30.42 ENCOUNTER FOR SURVEILLANCE OF INJECTABLE CONTRACEPTIVE: Primary | ICD-10-CM

## 2022-12-05 PROCEDURE — 96372 THER/PROPH/DIAG INJ SC/IM: CPT | Performed by: FAMILY MEDICINE

## 2022-12-05 PROCEDURE — 99207 PR NO CHARGE NURSE ONLY: CPT

## 2022-12-05 RX ADMIN — MEDROXYPROGESTERONE ACETATE 150 MG: 150 INJECTION, SUSPENSION INTRAMUSCULAR at 16:29

## 2022-12-05 NOTE — NURSING NOTE
BP: 122/78    LAST PAP/EXAM:   Lab Results   Component Value Date    PAP NIL 01/11/2021     URINE HCG:not indicated    The following medication was given:     MEDICATION: Depo Provera 150mg  ROUTE: IM  SITE: LEFT GLUT  : MYLAN  LOT #: [id number]  EXP:4/24  NDC 22340-269-70  NEXT INJECTION DUE: 2/20/23 - 3/6/23   Provider: Dr. Sharon Ortega MA

## 2022-12-06 ENCOUNTER — VIRTUAL VISIT (OUTPATIENT)
Dept: CARDIOLOGY | Facility: CLINIC | Age: 37
End: 2022-12-06
Payer: COMMERCIAL

## 2022-12-06 DIAGNOSIS — I34.1 MITRAL VALVE PROLAPSE: ICD-10-CM

## 2022-12-06 DIAGNOSIS — G47.00 INSOMNIA, UNSPECIFIED TYPE: Primary | ICD-10-CM

## 2022-12-06 PROCEDURE — 99215 OFFICE O/P EST HI 40 MIN: CPT | Mod: GT | Performed by: INTERNAL MEDICINE

## 2022-12-06 NOTE — PROGRESS NOTES
"Visit Date: 2022      Lisa Weller MD  [address] [place] [address]    Patient: Cruz Obregon  MRN: [id number]  : 09/15/1979    Dr. Sharon Carney:  It was a pleasure participating in the care of your patient, . Cruz Obregon.  As you know, she is a 37-year-old lady who I [facility] prolapse and insomnia.    PAST MEDICAL HISTORY:      1.  Sleep disorder in the past, possible narcolepsy.  Currently suffering from insomnia.  2.  HPV.  3.  Back pain.  4.  Positive CAMILO.  5.  Bacterial vaginosis.  6.  Gastroesophageal reflux disease.    I last saw her 10/21/2021.  At that time, she was doing adequately.  She presents today for continuing care.    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  She feels that is a \"pinched nerve\", but they have not found exactly what the underlying issue is.    She has problems being allergic to this, the  but otherwise is able to perform her activities without chest pain or shortness of breath.  She denies other PND, orthopnea, edema, palpitations, syncope or near syncope.    A 10-point review of systems is positive for insomnia.    CURRENT MEDICATIONS:  She is taking Depo-Provera, Ambien and Flonase.    PHYSICAL EXAMINATION:      VITAL SIGNS:  Her blood pressure on 2022 was 122/78, her weight is 143 pounds.  GENERAL:  She appears comfortable, well groomed.  PSYCHIATRIC:  She is alert and oriented x3.  HEENT:  Eyes do not appear grossly erythematous or have exudate.  RESPIRATORY:  She is breathing comfortably without gross cough.    The remainder of the comprehensive physical exam was deferred secondary to the COVID-19 pandemic and secondary to " video visit restrictions.    LABORATORY:  Total cholesterol 10/28/2021, total cholesterol 219, , 2021, potassium 3.5, GFR normal.  TSH and hemoglobin normal at that time.    Echocardiogram 2022, ejection fraction of 55% to 60%, mild mitral valve prolapse with mild mitral insufficiency and mild tricuspid insufficiency.    No change since 10/28/2021.      IMPRESSION:      Domitila is a 37-year-old lady who has several active issues:    1.  Mild mitral valve prolapse with mild secondary mitral insufficiency.    She is currently asymptomatic at a low moderate level of exertion.  Her last echocardiogram 2022 confirms normal LV systolic function with only mild mitral insufficiency and mild mitral prolapse.  Her echo is unchanged since 10/28/2021, currently asymptomatic.  We will continue to follow clinically.    2.  Insomnia with prior history of narcolepsy.    She says she does not have trouble with narcolepsy at present, but does have trouble sleeping.  She is interested in a Sleep referral.      PLAN:      1.  We will provide for Sleep referral to help manage insomnia.    2.  Virtual video visit followup in 2 years with echo and labs prior, earlier if needed.      Once again, it was a pleasure participating in the care of your patient, Ms. Domitila Obregon.  Please feel free to contact me at any time if any questions regarding her care in the future.        Jose Alcazar MD        D: 2022   T: 2022   MT: JAIRO    Name:     JOSELUIS OBREGON  MRN:      [id number]        Account:    [id number]   :      1985           Visit Date: 2022     Document: [id number]    cc:  Lisa Carney MD

## 2022-12-06 NOTE — NURSING NOTE
Chief Complaint   Patient presents with     Follow Up     Pt states vitals taken yest./in chart     ROCIO Tang/CMA

## 2022-12-06 NOTE — PROGRESS NOTES
"Domitila is a 37 year old who is being evaluated via a billable video visit.      Pt states in [place] for visit.     How would you like to obtain your AVS? MyChart  If the video visit is dropped, the invitation should be resent by: Text to cell phone: [phone]  Will anyone else be joining your video visit? Yuliana Dorsey, ROCIO/CMA    The patient has been notified of following:     \"This video visit will be conducted via a call between you and your physician/provider. We have found that certain health care needs can be provided without the need for an in-person physical exam.  This service lets us provide the care you need with a video conversation.  If a prescription is necessary we can send it directly to your pharmacy.  If lab work is needed we can place an order for that and you can then stop by our lab to have the test done at a later time.    Video visits are billed at different rates depending on your insurance coverage.  Please reach out to your insurance provider with any questions.    If during the course of the call the physician/provider feels a video visit is not appropriate, you will not be charged for this service.\"    Patient has given verbal consent for video visit? Yes    How would you like to obtain your AVS? Mail    Video-Visit Details    Type of service:  Video Visit    Video Start Time:115pm    Video End Time:125pm    Total visit time including video visit, chart review, charting, coordination of care =40min    Originating Location (pt. Location):patient home      Distant Location (provider location):   Off site home office    Platform used for Video Visit: Meena    See dictation #[id number]    [facility]#:[id number]  "

## 2022-12-06 NOTE — PATIENT INSTRUCTIONS
Virtual video visit followup in 2 years with echo and labs prior (CBC, BMP, TSH, fasting lipids) (unable to order these now due to order expiration in one year)

## 2022-12-13 ENCOUNTER — OFFICE VISIT (OUTPATIENT)
Dept: ORTHOPEDICS | Facility: CLINIC | Age: 37
End: 2022-12-13
Payer: COMMERCIAL

## 2022-12-13 VITALS
DIASTOLIC BLOOD PRESSURE: 78 MMHG | BODY MASS INDEX: 21.67 KG/M2 | HEIGHT: 68 IN | SYSTOLIC BLOOD PRESSURE: 122 MMHG | WEIGHT: 143 LBS

## 2022-12-13 DIAGNOSIS — M54.2 CERVICALGIA: Primary | ICD-10-CM

## 2022-12-13 DIAGNOSIS — M25.511 RIGHT SHOULDER PAIN, UNSPECIFIED CHRONICITY: ICD-10-CM

## 2022-12-13 PROCEDURE — 99214 OFFICE O/P EST MOD 30 MIN: CPT | Performed by: PEDIATRICS

## 2022-12-13 RX ORDER — CYCLOBENZAPRINE HCL 5 MG
TABLET ORAL
Qty: 30 TABLET | Refills: 0 | Status: SHIPPED | OUTPATIENT
Start: 2022-12-13 | End: 2022-12-13

## 2022-12-13 RX ORDER — CYCLOBENZAPRINE HCL 5 MG
TABLET ORAL
Qty: 30 TABLET | Refills: 0 | Status: SHIPPED | OUTPATIENT
Start: 2022-12-13 | End: 2023-10-05

## 2022-12-13 NOTE — PROGRESS NOTES
ASSESSMENT & PLAN    Cruz was seen today for pain.    Diagnoses and all orders for this visit:    Cervicalgia  -     Discontinue: cyclobenzaprine (FLEXERIL) 5 MG tablet; Take 1-2 tablets by mouth twice daily as needed for pain and spasm  -     Chiropractic Referral; Future  -     cyclobenzaprine (FLEXERIL) 5 MG tablet; Take 1-2 tablets by mouth twice daily as needed for pain and spasm    Right shoulder pain, unspecified chronicity  -     Discontinue: cyclobenzaprine (FLEXERIL) 5 MG tablet; Take 1-2 tablets by mouth twice daily as needed for pain and spasm  -     cyclobenzaprine (FLEXERIL) 5 MG tablet; Take 1-2 tablets by mouth twice daily as needed for pain and spasm      Ddx: ongoing myofascial neck pain (primary consideration), thoracic outlet syndrome (less likely), radiculopathy (but not with MRI findings).  Continue with focus on neck pain, add chiropractic care, continue therapy, and she is interested in muscle relaxant trial.   Pertinent potential adverse effect profile of prescribed medication(s) was discussed with the patient, who expressed understanding.  Questions answered. Discussed signs and symptoms that may indicate more serious issues; the patient was instructed to seek appropriate care if noted. Domitila indicates understanding of these issues and agrees with the plan.        See Patient Instructions  Patient Instructions   Good to hear of some overall improvement, particular with some of the radiating symptoms.  With persistent issues ongoing, we discussed options around imaging, rehab, medications, injections, referrals (medical spine, pain management, also chiropractic care, acupuncture, massage).  Following discussion, plan to continue with physical therapy.  Prescription placed for muscle relaxant, cyclobenzaprine (Flexeril).  Do not take with work or driving, drowsiness is a primary side effect.  We will also place external referral for chiropractic care.  Monitor course as the finish out  "physical therapy.  If on completion of PT there are persistent issues, or if any other questions or concerns, contact clinic.    If you have any further questions for your physician or physician s care team you can contact them thru [facility] or by calling  [phone] and use option 3 to leave a voice message.   Messages received during business hours will be returned same day.          Marcin Camargo DO  [facility]    SUBJECTIVE- Interim History December 13, 2022    Chief Complaint   Patient presents with     Right Arm - Pain       Cruz Obregon is a 37 year old female who is seen in f/u up for Right shoulder pain, unspecified chronicity. Since last visit on 9/30/22 patient has completed PT does feel it was helpful.   Currently complaining of neck pain.  Pain is directly in the middle of her neck.  Pain down the right arm, and can have pain down to her fingers.  Occasional numbness and tingling.   Difficulty with sleeping flat, but this is the only position her neck does not bother her in.     Feels the PT addressed any issues with her arm, but she continues to have the neck pain.   Did previously have a cervical MRI  Has not found anything to be helpful.   Denies any weakness in her arm.      - Now ~ 6 months from initial onset      **  Neck and wrist improved compared to previous.  Pain in neck appears to be more midline, sometimes to right.  For neck, feels like needs to lie on flat surface with no pillows. Not necessarily comfortable for other things, but feels like that is better for the neck.  Sleeping on pillow is uncomfortable.    Sensation change to right UE is improved; still may occur with long day, more physical activity (e.g., full work day and then going to PT).  Not waking with N/T anymore.        REVIEW OF SYSTEMS:  Review of Systems      OBJECTIVE:  /78   Ht 1.721 m (5' 7.75\")   Wt 64.9 kg (143 lb)   BMI 21.90 kg/m             Cervical Spine " "Exam    Range of Motion:       forward flexion min limitation, pt notes \"stiff\"         extension easier than flexion        lateral rotation mild discomfort right lateral similar to lateral bending        lateral flexion with some right lateral discomfort (baseline)    Inspection: mild head forward, loss of lordosis    Special Tests:    Equivocal Spurling for radicular symptoms to right UE; not to fingers, but to dorsal right hand; Spurling neg to left    Skin:     well perfused       capillary refill brisk    Lymphatics:      no edema noted in the upper extremities     **  Adson with no change to right UE        RADIOLOGY:  Previous cervical MRI: no focal spine pathology noted.        MR CERVICAL SPINE W/O CONTRAST 9/1/2022 10:38 AM     Provided History: Numbness and tingling in left arm; Numbness and  tingling in left arm  ICD-10: Numbness and tingling in left arm; Numbness and tingling in  left arm     Comparison: No similar studies     Technique: Sagittal T1-weighted, sagittal T2-weighted, sagittal STIR,  sagittal gradient echo, sagittal diffusion-weighted (with ADC map),  axial T2-weighted, and axial T2* gradient echo images of the cervical  spine were obtained without intravenous contrast.     Findings:  The cervical vertebrae are normally aligned.  There is no disc height  narrowing at any level.  There is normal signal within and normal  contour of the cervical spinal cord.  The findings on a level by level  basis are as follows:     C2-3:  No spinal canal or neural foraminal stenosis.     C3-4:  No spinal canal or neural foraminal stenosis.     C4-5:  No spinal canal or neural foraminal stenosis.     C5-6:  No spinal canal or neural foraminal stenosis.     C6-7:  No spinal canal or neural foraminal stenosis.     C7-T1:  No spinal canal or neural foraminal stenosis.     No abnormality of the paraspinous soft tissues.                                                                      Impression: Normal MRI " of the cervical spine.     ELTON ROCHA MD            Review of prior external note(s) from - PT  Prescription drug management  30 minutes spent on the date of the encounter doing chart review, history and exam, documentation and further activities per the note

## 2022-12-13 NOTE — LETTER
12/13/2022         RE: Cruz Obregon  [address]  [facility] [address]        Dear Colleague,    Thank you for referring your patient, Cruz Obregon, to the [facility] [place]. Please see a copy of my visit note below.    ASSESSMENT & PLAN    Cruz was seen today for pain.    Diagnoses and all orders for this visit:    Cervicalgia  -     Discontinue: cyclobenzaprine (FLEXERIL) 5 MG tablet; Take 1-2 tablets by mouth twice daily as needed for pain and spasm  -     Chiropractic Referral; Future  -     cyclobenzaprine (FLEXERIL) 5 MG tablet; Take 1-2 tablets by mouth twice daily as needed for pain and spasm    Right shoulder pain, unspecified chronicity  -     Discontinue: cyclobenzaprine (FLEXERIL) 5 MG tablet; Take 1-2 tablets by mouth twice daily as needed for pain and spasm  -     cyclobenzaprine (FLEXERIL) 5 MG tablet; Take 1-2 tablets by mouth twice daily as needed for pain and spasm      Ddx: ongoing myofascial neck pain (primary consideration), thoracic outlet syndrome (less likely), radiculopathy (but not with MRI findings).  Continue with focus on neck pain, add chiropractic care, continue therapy, and she is interested in muscle relaxant trial.   Pertinent potential adverse effect profile of prescribed medication(s) was discussed with the patient, who expressed understanding.  Questions answered. Discussed signs and symptoms that may indicate more serious issues; the patient was instructed to seek appropriate care if noted. Domitila indicates understanding of these issues and agrees with the plan.        See Patient Instructions  Patient Instructions   Good to hear of some overall improvement, particular with some of the radiating symptoms.  With persistent issues ongoing, we discussed options around imaging, rehab, medications, injections, referrals (medical spine, pain management, also chiropractic care, acupuncture, massage).  Following discussion, plan  to continue with physical therapy.  Prescription placed for muscle relaxant, cyclobenzaprine (Flexeril).  Do not take with work or driving, drowsiness is a primary side effect.  We will also place external referral for chiropractic care.  Monitor course as the finish out physical therapy.  If on completion of PT there are persistent issues, or if any other questions or concerns, contact clinic.    If you have any further questions for your physician or physician s care team you can contact them thru MyChart or by calling  [phone] and use option 3 to leave a voice message.   Messages received during business hours will be returned same day.          Marcin Camargo DO  [facility] [place]    SUBJECTIVE- Interim History December 13, 2022    Chief Complaint   Patient presents with     Right Arm - Pain       Cruz Obregon is a 37 year old female who is seen in f/u up for Right shoulder pain, unspecified chronicity. Since last visit on 9/30/22 patient has completed PT does feel it was helpful.   Currently complaining of neck pain.  Pain is directly in the middle of her neck.  Pain down the right arm, and can have pain down to her fingers.  Occasional numbness and tingling.   Difficulty with sleeping flat, but this is the only position her neck does not bother her in.     Feels the PT addressed any issues with her arm, but she continues to have the neck pain.   Did previously have a cervical MRI  Has not found anything to be helpful.   Denies any weakness in her arm.      - Now ~ 6 months from initial onset      **  Neck and wrist improved compared to previous.  Pain in neck appears to be more midline, sometimes to right.  For neck, feels like needs to lie on flat surface with no pillows. Not necessarily comfortable for other things, but feels like that is better for the neck.  Sleeping on pillow is uncomfortable.    Sensation change to right UE is improved; still may occur with  "long day, more physical activity (e.g., full work day and then going to PT).  Not waking with N/T anymore.        REVIEW OF SYSTEMS:  Review of Systems      OBJECTIVE:  /78   Ht 1.721 m (5' 7.75\")   Wt 64.9 kg (143 lb)   BMI 21.90 kg/m             Cervical Spine Exam    Range of Motion:       forward flexion min limitation, pt notes \"stiff\"         extension easier than flexion        lateral rotation mild discomfort right lateral similar to lateral bending        lateral flexion with some right lateral discomfort (baseline)    Inspection: mild head forward, loss of lordosis    Special Tests:    Equivocal Spurling for radicular symptoms to right UE; not to fingers, but to dorsal right hand; Spurling neg to left    Skin:     well perfused       capillary refill brisk    Lymphatics:      no edema noted in the upper extremities     **  Adson with no change to right UE        RADIOLOGY:  Previous cervical MRI: no focal spine pathology noted.        MR CERVICAL SPINE W/O CONTRAST 9/1/2022 10:38 AM     Provided History: Numbness and tingling in left arm; Numbness and  tingling in left arm  ICD-10: Numbness and tingling in left arm; Numbness and tingling in  left arm     Comparison: No similar studies     Technique: Sagittal T1-weighted, sagittal T2-weighted, sagittal STIR,  sagittal gradient echo, sagittal diffusion-weighted (with ADC map),  axial T2-weighted, and axial T2* gradient echo images of the cervical  spine were obtained without intravenous contrast.     Findings:  The cervical vertebrae are normally aligned.  There is no disc height  narrowing at any level.  There is normal signal within and normal  contour of the cervical spinal cord.  The findings on a level by level  basis are as follows:     C2-3:  No spinal canal or neural foraminal stenosis.     C3-4:  No spinal canal or neural foraminal stenosis.     C4-5:  No spinal canal or neural foraminal stenosis.     C5-6:  No spinal canal or neural " foraminal stenosis.     C6-7:  No spinal canal or neural foraminal stenosis.     C7-T1:  No spinal canal or neural foraminal stenosis.     No abnormality of the paraspinous soft tissues.                                                                      Impression: Normal MRI of the cervical spine.     ELTON ROCHA MD            Review of prior external note(s) from - PT  Prescription drug management  30 minutes spent on the date of the encounter doing chart review, history and exam, documentation and further activities per the note              Again, thank you for allowing me to participate in the care of your patient.        Sincerely,        Marcin Camargo, DO

## 2022-12-13 NOTE — PATIENT INSTRUCTIONS
Good to hear of some overall improvement, particular with some of the radiating symptoms.  With persistent issues ongoing, we discussed options around imaging, rehab, medications, injections, referrals (medical spine, pain management, also chiropractic care, acupuncture, massage).  Following discussion, plan to continue with physical therapy.  Prescription placed for muscle relaxant, cyclobenzaprine (Flexeril).  Do not take with work or driving, drowsiness is a primary side effect.  We will also place external referral for chiropractic care.  Monitor course as the finish out physical therapy.  If on completion of PT there are persistent issues, or if any other questions or concerns, contact clinic.    If you have any further questions for your physician or physician s care team you can contact them thru [facility] or by calling  [phone] and use option 3 to leave a voice message.   Messages received during business hours will be returned same day.

## 2023-01-01 NOTE — TELEPHONE ENCOUNTER
Patient is scheduled for 3/24/2021 at 10:00 am.  [facility]  2nd Floor  Primary Care  
Recommend patient reschedule this preop appointment since she recently tested positive for COVID.  Ok to come back to clinic on 3/24 based on quarantine guidelines.  Please notify and reschedule.  Thanks!  Anum   
Birth Sex: Male      Prenatal Complications:     Admitted From: labor/delivery    Place of Birth:     Resuscitation:     APGAR Scores:   1min:9                                                          5min: 9     10 min: --

## 2023-01-12 ENCOUNTER — THERAPY VISIT (OUTPATIENT)
Dept: PHYSICAL THERAPY | Facility: CLINIC | Age: 38
End: 2023-01-12
Payer: COMMERCIAL

## 2023-01-12 DIAGNOSIS — M54.2 CERVICALGIA: ICD-10-CM

## 2023-01-12 DIAGNOSIS — M25.511 RIGHT SHOULDER PAIN, UNSPECIFIED CHRONICITY: Primary | ICD-10-CM

## 2023-01-12 DIAGNOSIS — M79.621 PAIN OF RIGHT UPPER ARM: Primary | ICD-10-CM

## 2023-01-12 DIAGNOSIS — M54.2 NECK PAIN: ICD-10-CM

## 2023-01-12 PROCEDURE — 97012 MECHANICAL TRACTION THERAPY: CPT | Mod: GP | Performed by: PHYSICAL THERAPIST

## 2023-01-12 PROCEDURE — 97140 MANUAL THERAPY 1/> REGIONS: CPT | Mod: GP | Performed by: PHYSICAL THERAPIST

## 2023-01-12 PROCEDURE — 97110 THERAPEUTIC EXERCISES: CPT | Mod: GP | Performed by: PHYSICAL THERAPIST

## 2023-01-12 NOTE — PROGRESS NOTES
Physical Therapy Initial Evaluation  Subjective:  The history is provided by the patient. No  was used.   Patient Health History  Cruz Obregon being seen for neck pain.                                                   Therapist Generated HPI Evaluation         Type of problem:  Cervical spine.    This is a chronic condition.  Condition occurred with:  Insidious onset.  Where condition occurred: for unknown reasons.  Patient reports pain:  Cervical right side, cervical left side and central cervical spine.  Pain is described as aching and other (feels like a crook in the neck) and is constant.  Pain radiates to:  Upper arm right, upper arm left, shoulder right and shoulder left. Pain is the same all the time.  Since onset symptoms are unchanged.  Associated symptoms:  Loss of motion/stiffness.  and relieved by activity/movement and rest.      Restrictions due to condition include:  Working in normal job without restrictions.  Barriers include:  None as reported by patient.                        Objective:  System              Cervical/Thoracic Evaluation    AROM:  AROM Cervical:    Flexion:           Chin to chest; dull pull  Extension:       WNL  Rotation:         Left: WNL     Right:  Side Bend:      Left: Min loss; sharp pull on R     Right:  Min Loss; pull on L       Headaches: cervical                                                            General     ROS    Assessment/Plan:    {REHAB NOTES:908673}

## 2023-01-13 PROBLEM — M54.2 NECK PAIN: Status: ACTIVE | Noted: 2023-01-13

## 2023-01-13 NOTE — PROGRESS NOTES
Subjective:  HPI  Physical Exam                    Objective:  System    Physical Exam    General     ROS    Assessment/Plan:    PROGRESS  REPORT    Progress reporting period is from 12/4/2022 to 1/12/2023.       SUBJECTIVE  Subjective changes noted by patient:  Patient returns to PT today with referral from Dr Camargo for ongoing treatment of neck and arm pain.  Patient last seen on 12/4/2022.  Patient currently has c/o pain along both sides of the neck and it travels outward towards both shoulder and down between shoulder blades.  Patient stated the pain has always been in the R arm but she does get some pain in the L upper arm now as well.  Patient reports she has no issues with moving her neck so driving, working on a computer or reading are not issues but she does have a problem still with prolong use of the R arm above shoulder level.  Patient cleans commercial building for a living and when she has to use the R arm above shoulder level for prolong periods of time the R arm begins to feel heavy and achy (ie washing large bathroom mirrors).  Patient reports previous PT has helped to reduce the pain so is hoping additional PT will help get rid of the pain.       Current pain level is 3/10  .     Previous pain level was  NA  .   Changes in function:   Goals reset to reflect current level of function due to interruption in treatment.  Adverse reaction to treatment or activity: activity - increase R arm symptoms    OBJECTIVE  Changes noted in objective findings:      Sitting Posture: slight forward head, rounded shoulders.  Visibile decrease in cervical lordosis.  Cervical ROM:  WNL in all directions: c/o pain along both side of neck with looking down and turn her head to maximum end range.  Palpation: Tenderness occiput and Isaak cervical transverse processes.  Tenderness SCM, scalenes, UT and Levator Scapulae.    Shoulder AROM: WNL in all directions. No pain reported with arm movements.  Some tightness reported R  arm with reach back behind the back.  Shoulder Strength: 4-5/5 in all directions Bilaterally.  Palpation: No tenderness          ASSESSMENT/PLAN  Updated problem list and treatment plan: Diagnosis 1:  Neck and Arm pain  Pain -  hot/cold therapy, US, mechanical traction and manual therapy  Decreased joint mobility - manual therapy, therapeutic exercise and therapeutic activity  Decreased strength - therapeutic exercise and therapeutic activities  Impaired muscle performance - neuro re-education  Decreased function - therapeutic activities  Impaired posture - neuro re-education  STG/LTGs have been met or progress has been made towards goals:  Yes, goals reset to reflect current status to to interruption in treatment  Assessment of Progress: The patient's condition has potential to improve.  Self Management Plans:  Patient has been instructed in a home treatment program.  I have re-evaluated this patient and find that the nature, scope, duration and intensity of the therapy is appropriate for the medical condition of the patient.  Surlaina continues to require the following intervention to meet STG and LTG's:  PT    Recommendations:  This patient would benefit from continued therapy.     Frequency:  1 X week, once daily; on an every other week basis  Duration:  for 3 months        Please refer to the daily flowsheet for treatment today, total treatment time and time spent performing 1:1 timed codes.

## 2023-01-13 NOTE — PROGRESS NOTES
[facility]    OUTPATIENT Physical Therapy ORTHOPEDIC EVALUATION  PLAN OF TREATMENT FOR OUTPATIENT REHABILITATION  (COMPLETE FOR INITIAL CLAIMS ONLY)  Patient's Last Name, First Name, M.I.  YOB: 1985  Cruz Obregon    Provider s Name:  [facility]   Medical Record No.  [id number]   Start of Care Date:  10/03/22   Onset Date:   01/12/23 (new MD orders from Dr Camargo (new provider))   Treatment Diagnosis:  R shoulder and arm pain, L arm pain Medical Diagnosis:  Pain of right upper arm       Goals:     01/12/23 0500   Body Part   Goals listed below are for R shoulder/arm pain and L arm pain   Goal #1   Performance level   (no significant change)   Goal #2   Goal #2 reaching   Previous Functional Level No restrictions   Current Functional Level Can reach;overhead;reach across body;out to the side   Performance level for 10-15 min before the R arm begins to ache   STG Target Performance Reach;overhead;out to the side;across body   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; 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   Rationale for job requirements in their work place;for independent personal hygiene;for dressing;for bathing;for meal preparation   Due date 02/23/23   LTG Target Performance Reach;overhead;out to the side;across body   Performance Level able to use the arm for 1 hour of work without pain   Rationale for job requirements in their work place;for independent personal hygiene;for dressing;for bathing;for meal preparation   Due date 04/30/23         Therapy Frequency:  1x/week once daily; on an every other week basis  Predicted Duration of Therapy Intervention:  x3 months    Kiara Juan, PT                 I CERTIFY THE NEED FOR THESE SERVICES FURNISHED UNDER        THIS PLAN OF TREATMENT AND WHILE UNDER MY CARE     (Physician attestation of this document indicates  review and certification of the therapy plan).                     Certification Date From:  01/30/23   Certification Date To:  04/30/23    Referring Provider:  Marcin Camargo,     Initial Assessment        See Epic Evaluation SOC Date: 10/03/22

## 2023-01-26 NOTE — LETTER
1/21/2021         RE: Cruz Obregon  [address]  [facility] [address]        Dear Colleague,    Thank you for referring your patient, Cruz Obregon, to the [facility] [place]. Please see a copy of my visit note below.    Cruz is a 35 year old who is being evaluated via a billable video visit.      How would you like to obtain your AVS? MyChart  If the video visit is dropped, the invitation should be resent by: Text to cell phone: [phone]  Will anyone else be joining your video visit? Yuliana Calvert LPN      Visit Date:   01/21/2021      This is a virtual video visit being done virtually because of COVID-19.      Cruz Obregon is a 35-year-old female referred by Dr. Sharon Carney for evaluation and treatment of chronic migraine and tension headaches.      She indicates she has had migraine-type headaches her entire life and over the years, they have increased in their severity and frequency.  She indicates her typical headache starts behind her ears and then spreads to the front of her head.  It is a severe headache with photophobia and phonophobia.  Occasionally, she has had nausea and vomiting.  The headaches can last an entire day.  She has no aura or focal neurologic symptoms associated with the headaches.  She is currently having 4 headache days a week.      She also reports frontal pressure-type headaches that are much less frequent and have features suggesting tension-type headaches.      I do note she saw Dr. Eron Duval in our department, prior to his [facility], in 2019.  He tried her on nortriptyline, which was ineffective.  Sumatriptan was ineffective.  He discussed topiramate and propranolol with her, but she declined due to potential side effects.  I again discussed  these 2 drugs with her.  She is concerned about propranolol because of the effects on blood pressure and her heart.  She indicates she has a heart murmur.  She would  ----- Message from Cornelia Banda RN sent at 12/29/2022  2:52 PM CST -----  Regarding: Blood Sugar's for 1 month  Contact: [phone]  If patient does not call office, call patient to obtain blood sugars for 4 weeks.     not want to take topiramate, because it might cause weight loss.      Currently, she generally takes nothing for her headaches.  About 6-8 times a month, she might take Excedrin Migraine.      She did have a brain MRI scan done on 01/03/2011, which I reviewed.  It is a normal study.      She is interested in trying Aimovig, because this was the only thing her mother, who has migraine, has found to help with her migraines.      PAST MEDICAL HISTORY:  Aside from a report of a heart murmur, it is negative.      CURRENT MEDICATIONS:     1.  Depo-Provera every 3 months.   2.  Ambien.      ALLERGIES:  SHE IS ALLERGIC TO SULFA DRUGS.      FAMILY HISTORY:  Strongly positive for migraine, including her father, mother, sisters, and paternal grandmother.      SOCIAL HISTORY:  She does not work outside the home.  She does not smoke or use alcohol.      NEUROLOGIC EXAMINATION:  The patient is alert and cooperative.  She has full extraocular motility.  She has a symmetric smile.  Speech is clear.  She moves her arms well.  Finger-to-nose is done accurately.  Her gait appears normal.  She is able to tandem walk.      IMPRESSION:   1.  Chronic migraine without aura.   2.  Episodic tension headaches.      PLAN:  The patient is really interested in trying Aimovig as a preventative.  She tells me her mother had good success with this where other treatments for her mother had proven ineffective.      I did tell her sometimes Aimovig is difficult to get approval for due to insurance, but I think it is a viable option for her.  I did suggest a referral to the Headache Clinic at the [facility] for an opinion, as well as help with getting Aimovig, if felt appropriate.  I also stated that typically the staff at the Headache Clinic likes to have the patient come in for their first injection.      She is comfortable with this approach, and I did make a Headache Clinic referral for her.      ADDENDUM:  Total visit time was 30  minutes.  This included record review, interaction with the patient, care coordination, and documentation.         KEN PACHECO MD             D: 2021   T: 2021   MT: SUZANNE      Name:     JOSELUIS PEREIRA   MRN:      [id number]        Account:      [id number]   :      1985           Visit Date:   2021      Document: [id number]       cc: Lisa Carney MD         Again, thank you for allowing me to participate in the care of your patient.        Sincerely,        Ken Pacheco MD

## 2023-02-03 ENCOUNTER — THERAPY VISIT (OUTPATIENT)
Dept: PHYSICAL THERAPY | Facility: CLINIC | Age: 38
End: 2023-02-03
Payer: COMMERCIAL

## 2023-02-03 DIAGNOSIS — M79.621 PAIN OF RIGHT UPPER ARM: Primary | ICD-10-CM

## 2023-02-03 DIAGNOSIS — M54.2 NECK PAIN: ICD-10-CM

## 2023-02-03 PROCEDURE — 97140 MANUAL THERAPY 1/> REGIONS: CPT | Mod: GP | Performed by: PHYSICAL THERAPIST

## 2023-02-03 PROCEDURE — 97110 THERAPEUTIC EXERCISES: CPT | Mod: GP | Performed by: PHYSICAL THERAPIST

## 2023-02-15 ENCOUNTER — PATIENT OUTREACH (OUTPATIENT)
Dept: FAMILY MEDICINE | Facility: CLINIC | Age: 38
End: 2023-02-15
Payer: COMMERCIAL

## 2023-02-21 ENCOUNTER — ALLIED HEALTH/NURSE VISIT (OUTPATIENT)
Dept: FAMILY MEDICINE | Facility: CLINIC | Age: 38
End: 2023-02-21
Payer: COMMERCIAL

## 2023-02-21 VITALS — BODY MASS INDEX: 22.21 KG/M2 | DIASTOLIC BLOOD PRESSURE: 82 MMHG | SYSTOLIC BLOOD PRESSURE: 134 MMHG | WEIGHT: 145 LBS

## 2023-02-21 DIAGNOSIS — Z30.42 DEPO-PROVERA CONTRACEPTIVE STATUS: Primary | ICD-10-CM

## 2023-02-21 PROCEDURE — 96372 THER/PROPH/DIAG INJ SC/IM: CPT | Performed by: FAMILY MEDICINE

## 2023-02-21 PROCEDURE — 99207 PR NO CHARGE NURSE ONLY: CPT

## 2023-02-21 RX ADMIN — MEDROXYPROGESTERONE ACETATE 150 MG: 150 INJECTION, SUSPENSION INTRAMUSCULAR at 15:39

## 2023-02-21 NOTE — PROGRESS NOTES
Clinic Administered Medication Documentation    Administrations This Visit     medroxyPROGESTERone (DEPO-PROVERA) injection 150 mg     Admin Date  02/21/2023 Action  Given Dose  150 mg Route  Intramuscular Site  Right Gluteus Luiz Administered By  Brigida Jones RN    Ordering Provider: Lisa Montana MD    Patient Supplied?: No                  Depo Provera Documentation    URINE HCG: not indicated    Depo-Provera Standing Order inclusion/exclusion criteria reviewed.   Patient meets: inclusion criteria     BP: Data Unavailable  LAST PAP/EXAM:   Lab Results   Component Value Date    PAP NIL 01/11/2021       Prior to injection, verified patient identity using patient's name and date of birth. Medication was administered. Please see MAR and medication order for additional information.     Was entire vial of medic/ation used? Yes  Vial/Syringe: Single dose vial  Expiration Date:  05/2024    Patient instructed to stay in clinic after the injection but patient declined.  NEXT INJECTION DUE: 5/9/23 - 5/23/23    Scheduled MA visit for 5/9/23 at 3:30 pm. Pt declined Depo perpetual calendar.     Brigida Jones RN

## 2023-04-13 NOTE — TELEPHONE ENCOUNTER
FYI to provider - Patient is lost to pap tracking follow-up. Attempts to contact pt have been made per reminder process and there has been no reply and/or no appt scheduled. Contact hx listed below.     11/17/10 pap- ASCUS + LR HPV (54). Plan-- per protocol, repeat screening pap in one year  1/4/12 pap ASCUS, + HR HPV (66) and LR HPV ( 6, 72). Plan-- colposcopy within 3 months.  1/30/12 colposcopy. bx- inflammation. Plan-- pap in 6 months. (due 7/30/12)   9/10/12 pap ASCUS neg HPV. Plan-- Follow up in 3 months if still mild changes.(due 12/10/12)   12/12/12 pap NIL, + HR HPV (52). Plan- colposcopy  2/26/13 colposcopy.no visible pathology/no bx taken.  Plan-- repeat pap in 6 months. (due 8/26/13)   9/23/13 pap NIL. Plan-- repeat pap/HPV on or about 2/26/14 (one year from colp)  2/18/14 pap NIL/neg HPV. Plan: pap in 3 years.  4/24/17 NIL pap/neg HR HPV. Routine screening.  1/11/21 NIL pap, + HR HPV (not 16 or 18). Plan cotest in 1 year due bef 1/11/22.  3/2/22 NIL Pap, Neg HR HPV. Plan: Cotest in 1 yr.   2/15/23 Reminder michelle  3/14/23 Reminder call - lm  4/13/23 Lost to follow up

## 2023-04-16 ENCOUNTER — HEALTH MAINTENANCE LETTER (OUTPATIENT)
Age: 38
End: 2023-04-16

## 2023-04-18 DIAGNOSIS — F51.01 PRIMARY INSOMNIA: ICD-10-CM

## 2023-04-18 RX ORDER — ZOLPIDEM TARTRATE 5 MG/1
5 TABLET ORAL
Qty: 30 TABLET | Refills: 3 | Status: SHIPPED | OUTPATIENT
Start: 2023-04-18 | End: 2023-09-26

## 2023-05-08 PROBLEM — M79.621 PAIN OF RIGHT UPPER ARM: Status: RESOLVED | Noted: 2022-10-03 | Resolved: 2023-05-08

## 2023-05-08 PROBLEM — M54.2 NECK PAIN: Status: RESOLVED | Noted: 2023-01-13 | Resolved: 2023-05-08

## 2023-05-08 NOTE — PROGRESS NOTES
Discharge Note    Progress reporting period is from last progress note on 01/12/23 to Feb 3, 2023.    Cruz failed to follow up and current status is unknown.  Please see information below for last relevant information on current status.  Patient seen for 7 visits.    SUBJECTIVE  Subjective changes noted by patient:  Pt presents to PT today with c/o back pain (from head to butt); States the more she does at work the worse her pain is so she put in her 1 month notice to leave the job.  .  Current pain level is 6/10.     Previous pain level was   .   Changes in function:  Yes (See Goal flowsheet attached for changes in current functional level)  Adverse reaction to treatment or activity: None    OBJECTIVE  Changes noted in objective findings: Posture: poor sitting; pt slouching in lobby.  Added mid/ low back stretches to program to help with better sitting and standing posture.     ASSESSMENT/PLAN  Diagnosis: R shoulder and arm pain, L arm pain   Updated problem list and treatment plan:   Pain - HEP  STG/LTGs have been met or progress has been made towards goals:  Yes, please see goal flowsheet for most current information  Assessment of Progress: current status is unknown.    Last current status:     Self Management Plans:  HEP  I have re-evaluated this patient and find that the nature, scope, duration and intensity of the therapy is appropriate for the medical condition of the patient.  Cruz continues to require the following intervention to meet STG and LTG's:  HEP.    Recommendations:  Discharge with current home program.  Patient to follow up with MD as needed.    Please refer to the daily flowsheet for treatment today, total treatment time and time spent performing 1:1 timed codes.

## 2023-05-09 ENCOUNTER — ALLIED HEALTH/NURSE VISIT (OUTPATIENT)
Dept: FAMILY MEDICINE | Facility: CLINIC | Age: 38
End: 2023-05-09
Payer: COMMERCIAL

## 2023-05-09 VITALS — BODY MASS INDEX: 21.32 KG/M2 | WEIGHT: 139.2 LBS | SYSTOLIC BLOOD PRESSURE: 119 MMHG | DIASTOLIC BLOOD PRESSURE: 77 MMHG

## 2023-05-09 DIAGNOSIS — Z30.42 DEPO-PROVERA CONTRACEPTIVE STATUS: Primary | ICD-10-CM

## 2023-05-09 PROCEDURE — 96372 THER/PROPH/DIAG INJ SC/IM: CPT | Performed by: FAMILY MEDICINE

## 2023-05-09 PROCEDURE — 99207 PR NO CHARGE NURSE ONLY: CPT

## 2023-05-09 RX ADMIN — MEDROXYPROGESTERONE ACETATE 150 MG: 150 INJECTION, SUSPENSION INTRAMUSCULAR at 15:28

## 2023-05-09 NOTE — Clinical Note
Patient has no refills remaining for her Depo. Last dose was administered today. Patient is aware of this. Patient declined to schedule a Preventative.

## 2023-05-09 NOTE — PROGRESS NOTES
Clinic Administered Medication Documentation      Depo Provera Documentation    Depo-Provera Standing Order inclusion/exclusion criteria reviewed.     Is this the initial or subsequent dose of Depo Provera? Subsequent dose - patient is within the acceptable window of time (11-15 weeks) for subsequent injection. Pregnancy test not indicated.    Patient meets: inclusion criteria     Is there an active order (written within the past 365 days, with administrations remaining, not ) in the chart? Yes.     Prior to injection, verified patient identity using patient's name and date of birth. Medication was administered. Please see MAR and medication order for additional information.     Vial/Syringe: Single dose vial. Was entire vial of medication used? Yes    Patient instructed to remain in clinic for 15 minutes and report any adverse reaction to staff immediately.  NEXT INJECTION DUE: 23 - 23    Patient has no refills remaining. Refill encounter opened, order pended and Routed to the provider       Wendy Blackwell RN on 2023 at 3:38 PM      Patient declined scheduling an appt with Dr. Weller. Routed chart to PCP for no refills remaining.

## 2023-09-19 ENCOUNTER — OFFICE VISIT (OUTPATIENT)
Dept: FAMILY MEDICINE | Facility: CLINIC | Age: 38
End: 2023-09-19
Payer: COMMERCIAL

## 2023-09-19 VITALS
SYSTOLIC BLOOD PRESSURE: 117 MMHG | RESPIRATION RATE: 12 BRPM | WEIGHT: 148.6 LBS | HEART RATE: 53 BPM | BODY MASS INDEX: 23.32 KG/M2 | DIASTOLIC BLOOD PRESSURE: 79 MMHG | OXYGEN SATURATION: 100 % | HEIGHT: 67 IN | TEMPERATURE: 98.1 F

## 2023-09-19 DIAGNOSIS — Z30.42 DEPO-PROVERA CONTRACEPTIVE STATUS: Primary | ICD-10-CM

## 2023-09-19 LAB — HCG UR QL: NEGATIVE

## 2023-09-19 PROCEDURE — 96372 THER/PROPH/DIAG INJ SC/IM: CPT | Performed by: PHYSICIAN ASSISTANT

## 2023-09-19 PROCEDURE — 81025 URINE PREGNANCY TEST: CPT | Performed by: PHYSICIAN ASSISTANT

## 2023-09-19 PROCEDURE — 99207 PR NO CHARGE NURSE ONLY: CPT | Performed by: PHYSICIAN ASSISTANT

## 2023-09-19 RX ORDER — MEDROXYPROGESTERONE ACETATE 150 MG/ML
150 INJECTION, SUSPENSION INTRAMUSCULAR
Qty: 1 ML | Refills: 3 | OUTPATIENT
Start: 2023-09-19

## 2023-09-19 RX ORDER — MEDROXYPROGESTERONE ACETATE 150 MG/ML
150 INJECTION, SUSPENSION INTRAMUSCULAR
Status: COMPLETED | OUTPATIENT
Start: 2023-09-19 | End: 2024-05-30

## 2023-09-19 RX ADMIN — MEDROXYPROGESTERONE ACETATE 150 MG: 150 INJECTION, SUSPENSION INTRAMUSCULAR at 11:39

## 2023-09-19 ASSESSMENT — PATIENT HEALTH QUESTIONNAIRE - PHQ9
SUM OF ALL RESPONSES TO PHQ QUESTIONS 1-9: 15
10. IF YOU CHECKED OFF ANY PROBLEMS, HOW DIFFICULT HAVE THESE PROBLEMS MADE IT FOR YOU TO DO YOUR WORK, TAKE CARE OF THINGS AT HOME, OR GET ALONG WITH OTHER PEOPLE: SOMEWHAT DIFFICULT
SUM OF ALL RESPONSES TO PHQ QUESTIONS 1-9: 15

## 2023-09-19 NOTE — PROGRESS NOTES
"  Assessment & Plan   Problem List Items Addressed This Visit    None  Visit Diagnoses       Depo-Provera contraceptive status    -  Primary    Relevant Medications    medroxyPROGESTERone (DEPO-PROVERA) 150 MG/ML IM injection    medroxyPROGESTERone (DEPO-PROVERA) injection 150 mg    Other Relevant Orders    HCG Qual, Urine ([id number]) (Completed)           Depo was given  Follow up every 3 months       15 minutes spent by me on the date of the encounter doing chart review, history and exam, documentation and further activities per the note       Depression Screening Follow Up        9/19/2023    10:47 AM   PHQ   PHQ-9 Total Score 15   Q9: Thoughts of better off dead/self-harm past 2 weeks Not at all         Follow Up Actions Taken           Glory Wesley PA-C  [facility] SARA Ramesh is a 38 year old, presenting for the following health issues:  Contraception        9/19/2023    10:53 AM   Additional Questions   Roomed by velma   Accompanied by self         9/19/2023    10:53 AM   Patient Reported Additional Medications   Patient reports taking the following new medications mo       HPI     Medication Followup of Depo shot  Taking Medication as prescribed: NO-missed last injection that was schedule for July  Side Effects:  None  Medication Helping Symptoms:  yes  No periods due to depo. Last intercourse was 16 months ago.       Review of Systems   Constitutional, HEENT, cardiovascular, pulmonary, gi and gu systems are negative, except as otherwise noted.      Objective    /79 (BP Location: Left arm, Patient Position: Sitting, Cuff Size: Adult Regular)   Pulse 53   Temp 98.1  F (36.7  C) (Oral)   Resp 12   Ht 1.695 m (5' 6.75\")   Wt 67.4 kg (148 lb 9.6 oz)   SpO2 100%   BMI 23.45 kg/m    Body mass index is 23.45 kg/m .  Physical Exam   GENERAL: healthy, alert and no distress  EYES: Eyes grossly normal to inspection,  conjunctivae and sclerae normal  HENT: normal " cephalic/atraumatic, face is symmetrical,   RESP: no difficulty breathing, no use of accessory muscles observed.   CV: no peripheral edema and color normal, no parasternal heaves visualized.   MS: no gross musculoskeletal defects noted, no edema  SKIN: no suspicious lesions or rashes  NEURO: Normal strength and tone, mentation intact and speech normal  PSYCH: mentation appears normal, affect normal/bright      Results for orders placed or performed in visit on 09/19/23 (from the past 24 hour(s))   HCG Qual, Urine ([id number])   Result Value Ref Range    hCG Urine Qualitative Negative Negative

## 2023-09-24 DIAGNOSIS — F51.01 PRIMARY INSOMNIA: ICD-10-CM

## 2023-09-25 ENCOUNTER — E-VISIT (OUTPATIENT)
Dept: FAMILY MEDICINE | Facility: CLINIC | Age: 38
End: 2023-09-25
Payer: COMMERCIAL

## 2023-09-25 DIAGNOSIS — F51.01 PRIMARY INSOMNIA: ICD-10-CM

## 2023-09-25 PROCEDURE — 99207 PR NO CHARGE LOS: CPT | Performed by: FAMILY MEDICINE

## 2023-09-25 RX ORDER — ZOLPIDEM TARTRATE 5 MG/1
5 TABLET ORAL
Qty: 30 TABLET | OUTPATIENT
Start: 2023-09-25

## 2023-09-26 RX ORDER — ZOLPIDEM TARTRATE 5 MG/1
5 TABLET ORAL
Qty: 30 TABLET | Refills: 3 | Status: SHIPPED | OUTPATIENT
Start: 2023-09-26 | End: 2024-02-07

## 2023-09-26 NOTE — PATIENT INSTRUCTIONS
Thank you for choosing us for your care. I have placed an order for a prescription so that you can start treatment. View your full visit summary for details by clicking on the link below. Your pharmacist will able to address any questions you may have about the medication.     If you're not feeling better within 5-7 days, please schedule an appointment.  You can schedule an appointment right here in [facility], or call [phone]  If the visit is for the same symptoms as your eVisit, we'll refund the cost of your eVisit if seen within seven days.

## 2023-10-05 ENCOUNTER — OFFICE VISIT (OUTPATIENT)
Dept: OPTOMETRY | Facility: CLINIC | Age: 38
End: 2023-10-05
Payer: COMMERCIAL

## 2023-10-05 DIAGNOSIS — Z01.00 EXAMINATION OF EYES AND VISION: Primary | ICD-10-CM

## 2023-10-05 DIAGNOSIS — H52.03 HYPEROPIA, BILATERAL: ICD-10-CM

## 2023-10-05 DIAGNOSIS — H04.123 DRY EYE SYNDROME OF BOTH EYES: ICD-10-CM

## 2023-10-05 DIAGNOSIS — H10.13 ALLERGIC CONJUNCTIVITIS OF BOTH EYES: ICD-10-CM

## 2023-10-05 PROCEDURE — 92015 DETERMINE REFRACTIVE STATE: CPT | Performed by: OPTOMETRIST

## 2023-10-05 PROCEDURE — 92014 COMPRE OPH EXAM EST PT 1/>: CPT | Performed by: OPTOMETRIST

## 2023-10-05 RX ORDER — POLYETHYLENE GLYCOL 400 AND PROPYLENE GLYCOL 4; 3 MG/ML; MG/ML
1 SOLUTION/ DROPS OPHTHALMIC 4 TIMES DAILY
Qty: 6 ML | Refills: 12 | Status: SHIPPED | OUTPATIENT
Start: 2023-10-05

## 2023-10-05 RX ORDER — OLOPATADINE HYDROCHLORIDE 2 MG/ML
1 SOLUTION/ DROPS OPHTHALMIC DAILY
Qty: 2.5 ML | Refills: 11 | Status: SHIPPED | OUTPATIENT
Start: 2023-10-05

## 2023-10-05 ASSESSMENT — EXTERNAL EXAM - LEFT EYE: OS_EXAM: NORMAL

## 2023-10-05 ASSESSMENT — VISUAL ACUITY
OS_CC: 20/20-1
CORRECTION_TYPE: GLASSES
OS_SC: 20/20
OD_SC: 20/20
METHOD: SNELLEN - LINEAR
OD_CC: 20/20-1
OS_CC: 20/20
OD_CC: 20/20

## 2023-10-05 ASSESSMENT — REFRACTION_WEARINGRX
OD_SPHERE: +1.50
OD_CYLINDER: SPHERE
SPECS_TYPE: SVL
OS_SPHERE: +1.50
OS_CYLINDER: SPHERE

## 2023-10-05 ASSESSMENT — CUP TO DISC RATIO
OS_RATIO: 0.35
OD_RATIO: 0.35

## 2023-10-05 ASSESSMENT — KERATOMETRY
OD_K1POWER_DIOPTERS: 43.75
OD_AXISANGLE2_DEGREES: 177
OS_K1POWER_DIOPTERS: 43.50
OS_K2POWER_DIOPTERS: 44.0
OS_AXISANGLE2_DEGREES: 168
OD_K2POWER_DIOPTERS: 44.00
OD_AXISANGLE_DEGREES: 087
OS_AXISANGLE_DEGREES: 078

## 2023-10-05 ASSESSMENT — SLIT LAMP EXAM - LIDS
COMMENTS: MEIBOMIAN GLAND DYSFUNCTION
COMMENTS: MEIBOMIAN GLAND DYSFUNCTION

## 2023-10-05 ASSESSMENT — CONF VISUAL FIELD
OS_NORMAL: 1
OS_SUPERIOR_TEMPORAL_RESTRICTION: 0
OD_NORMAL: 1
OS_INFERIOR_TEMPORAL_RESTRICTION: 0
OD_SUPERIOR_NASAL_RESTRICTION: 0
OS_SUPERIOR_NASAL_RESTRICTION: 0
OD_SUPERIOR_TEMPORAL_RESTRICTION: 0
OS_INFERIOR_NASAL_RESTRICTION: 0
OD_INFERIOR_NASAL_RESTRICTION: 0
OD_INFERIOR_TEMPORAL_RESTRICTION: 0

## 2023-10-05 ASSESSMENT — REFRACTION_MANIFEST
OD_CYLINDER: SPHERE
OS_SPHERE: +1.75
METHOD_AUTOREFRACTION: 1
OS_CYLINDER: SPHERE
OD_SPHERE: +1.75

## 2023-10-05 ASSESSMENT — EXTERNAL EXAM - RIGHT EYE: OD_EXAM: NORMAL

## 2023-10-05 ASSESSMENT — TONOMETRY
OD_IOP_MMHG: 18
IOP_METHOD: TONOPEN
OS_IOP_MMHG: 16

## 2023-10-05 NOTE — PROGRESS NOTES
Chief Complaint   Patient presents with    Annual Eye Exam         Last Eye Exam: 9-  Dilated Previously: Yes    What are you currently using to see?  glasses       Distance Vision Acuity: Satisfied with vision    Near Vision Acuity: Satisfied with vision while reading  with glasses    Eye Comfort: good  Do you use eye drops? : Yes: systane   Occupation or Hobbies: caterer and cleaning     Trini [place] Optometric Assistant, A.B.O.C.          Medical, surgical and family histories reviewed and updated 10/5/2023.       OBJECTIVE: See Ophthalmology exam    ASSESSMENT:    ICD-10-CM    1. Examination of eyes and vision  Z01.00 EYE EXAM (SIMPLE-NONBILLABLE)      2. Dry eye syndrome of both eyes  H04.123 EYE EXAM (SIMPLE-NONBILLABLE)     polyethylene glycol-propylene glycol (SYSTANE ULTRA) 0.4-0.3 % SOLN ophthalmic solution      3. Allergic conjunctivitis of both eyes  H10.13 EYE EXAM (SIMPLE-NONBILLABLE)     olopatadine (PATADAY) 0.2 % ophthalmic solution      4. Hyperopia, bilateral  H52.03 REFRACTION          PLAN:     Patient Instructions   Artificial tears- 1 drop both eyes 2-4 x daily.     Pataday to be used once daily for itchy eyes.  Use as needed. Contact your pharmacy for refills.    Eyeglass prescription given.  If continued blurred vision then I recommend seeing your PCP to rule out diabetes.    Return in 1 year for a complete eye exam or sooner if needed.    Jarret Matson, OD

## 2023-10-05 NOTE — PATIENT INSTRUCTIONS
Artificial tears- 1 drop both eyes 2-4 x daily.     Pataday to be used once daily for itchy eyes.  Use as needed. Contact your pharmacy for refills.    Eyeglass prescription given.  If continued blurred vision then I recommend seeing your PCP to rule out diabetes.    Hyperopia (farsightedness) is a common type of refractive error where distant objects may be seen more clearly than objects that are near. However, people experience hyperopia differently. Some people may not notice any problems with their vision, especially when they are young. For people with significant hyperopia, vision can be blurry for objects at any distance, near or far.    Return in 1 year for a complete eye exam or sooner if needed.    Jarret Matson, OD    The affects of the dilating drops last for 4- 6 hours.  You will be more sensitive to light and vision will be blurry up close.  Do not drive if you do not feel comfortable.  Mydriatic sunglasses were given if needed.      Optometry Providers       Clinic Locations                                 Telephone Number   Dr. Leny Murray    [place]   [facility] [phone]     [facility] Hours:                [facility] Hours:       [facility] Hours:   [address]   [address]     [address] [place] [address]   [place] [address]    [place] [address]  Phone: [phone]                    Phone: [phone]     Phone: [phone]                      Monday 8:00-6:00                          Monday 8:00-6:00                          Monday 8:00-6:00              Tuesday 8:00-6:00                          Tuesday 8:00-6:00                          Tuesday 8:00-6:00              Wednesday 8:00-6:00                  Wednesday 8:00-6:00                   Wednesday 8:00-6:00      Thursday 8:00-6:00                         Thursday 8:00-6:00                         Thursday 8:00-6:00            Friday 8:00-5:00                              Friday 8:00-5:00                              Friday 8:00-5:00    [facility] Hours:   [address] [place] [address]  [phone]    Monday 9:00-6:00  Tuesday 9:00-6:00  Wednesday 9:00-6:00  Thursday 9:00-6:00  Friday 9:00-5:00  As always, Thank you for trusting us with your health care needs!  There is a combination of three treatments which can greatly improve symptoms of dry eyes.     Artificial tears  Heat (eyes closed)  Eyelid and eyelash cleansing (eyes closed)     Use one drop of artificial tears both eyes 4 x daily.  Once in the morning, lunch, dinner and bedtime. Continue to use the drops regardless if your eyes are comfortable or not.  Artificial tears work best as a preventative and not as well after your eyes are starting to bother you.  It may take 4- 6 weeks of using the drops before you notice improvement.  If after that time you are still having problems schedule an appointment for an evaluation and discussion of different treatments such as Restasis or Xiidra.  Dry eyes are a chronic condition and you may have more symptoms at certain times of the year.    Excess tearing can be due to the right tears not working properly or a blockage in the tear drainage system.  You can try using artificial tears 1 drop both eyes 4 x day.  If the excess tearing is bothersome after 4-6 weeks of treatment then we can send you for further testing.  This would entail a referral to our oculoplastic specialist Dr. Man Clemens at the [facility]-[phone].    Recommended brands are:    Systane Complete  Systane Ultra  Systane Balance  Refresh Advanced Optive  Refresh Relieva  Blink    Recommended brands for contact lens wearers are:    Systane contacts  Refresh contacts  Blink contacts    If you are using drops more than 4 x day or have sensitivities  to preservatives I recommend non preserved artificial tears.  These come in 1 use vials.  They can be used every 1-2 hours.  Do not reuse the vials.    Recommended brands are:    Refresh Optive Jourdan-3  Systane- preservative free  Refresh-  preservative free  Blink- preservative free    Gels or ointment can be used at night.    Recommended brands are:    Systane Gel  Refresh Gel  Blink Gel  Genteal Gel    Systane night time (ointment)  Refresh Celluvisc  Refresh PM (ointment)    Optase dry eye spray.  Spray to eyelids 3-4 x daily.  This can be purchased on Amazon.      Visine, Clear Eyes or Murine (drops that get the red out) can irritate the eyes and cause a rebound effect where the eyes become more red and you end up using more drops.  Avoid drops containing tetrahydrozoline, naphazoline, phenylephrine, oxymetazoline.      OTC Lumify is a newer product that gives immediate redness relief without the rebound effect.  Use as needed to take the redness out.    Artificial tears may be used with other drops (such as allergy, glaucoma, antibiotics) around the same time.  Be sure to wait 5 minutes in between drops.    Heat to the eyelids can also improve your symptoms of dry eyes.  Heath heat masks can be purchased at Amazon to be used nightly for 10-15 minutes.  Other options are gel masks that can be put in the microwave and purchased at most pharmacies.      Tea Tree Oil eyelid cleansers recommended are Ocusoft Oust foam cleanser to cleanse eyelids/lashes at night and in the am. Other options are Blephadex or Cliradex eyelid wipes.  KEEP EYES CLOSED when using these products.  These can be purchased on amazon.com   A good product for make up remover with tea tree oil is WeLoveEyes.  This can be found at www.[employer] or [employer].    Other good eyelid cleansers have hypochlorous which removes excess bacteria and is safe around the eyes. Products are Avenova, Ocusoft Hypochlor or Heyedrate. Spray solution onto  cotton pad, close eyes and gently apply to eyelids and eyelashes using side to side motion.  You can also KEEP EYES CLOSED spray and rub into eyelashes.  You do not need to rinse it off. Use morning and evening. These products can be found on Amazon.  You can check with your local pharmacy and see if they can order if for you if they don't have it.    Other brands of eyelid cleansing wipes are:    Ocusoft wipes  Systane wipes    A great eye make up line is [email or web address]

## 2023-10-05 NOTE — LETTER
10/5/2023         RE: Cruz Obregon  [address]  [facility] [address]        Dear Colleague,    Thank you for referring your patient, Cruz Obregon, to the [facility]. Please see a copy of my visit note below.    Chief Complaint   Patient presents with     Annual Eye Exam         Last Eye Exam: 9-  Dilated Previously: Yes    What are you currently using to see?  glasses       Distance Vision Acuity: Satisfied with vision    Near Vision Acuity: Satisfied with vision while reading  with glasses    Eye Comfort: good  Do you use eye drops? : Yes: systane   Occupation or Hobbies: caterer and cleaning     Trini [place]ess Optometric Assistant, A.B.O.C.          Medical, surgical and family histories reviewed and updated 10/5/2023.       OBJECTIVE: See Ophthalmology exam    ASSESSMENT:    ICD-10-CM    1. Examination of eyes and vision  Z01.00 EYE EXAM (SIMPLE-NONBILLABLE)      2. Dry eye syndrome of both eyes  H04.123 EYE EXAM (SIMPLE-NONBILLABLE)     polyethylene glycol-propylene glycol (SYSTANE ULTRA) 0.4-0.3 % SOLN ophthalmic solution      3. Allergic conjunctivitis of both eyes  H10.13 EYE EXAM (SIMPLE-NONBILLABLE)     olopatadine (PATADAY) 0.2 % ophthalmic solution      4. Hyperopia, bilateral  H52.03 REFRACTION          PLAN:     Patient Instructions   Artificial tears- 1 drop both eyes 2-4 x daily.     Pataday to be used once daily for itchy eyes.  Use as needed. Contact your pharmacy for refills.    Eyeglass prescription given.  If continued blurred vision then I recommend seeing your PCP to rule out diabetes.    Return in 1 year for a complete eye exam or sooner if needed.    Jarret Matson, JACE             Again, thank you for allowing me to participate in the care of your patient.        Sincerely,        Jarret Matson OD

## 2023-12-19 ENCOUNTER — ALLIED HEALTH/NURSE VISIT (OUTPATIENT)
Dept: FAMILY MEDICINE | Facility: CLINIC | Age: 38
End: 2023-12-19
Payer: COMMERCIAL

## 2023-12-19 DIAGNOSIS — Z30.42 DEPO-PROVERA CONTRACEPTIVE STATUS: Primary | ICD-10-CM

## 2023-12-19 PROCEDURE — 96372 THER/PROPH/DIAG INJ SC/IM: CPT | Performed by: PHYSICIAN ASSISTANT

## 2023-12-19 PROCEDURE — 99207 PR NO CHARGE NURSE ONLY: CPT

## 2023-12-19 RX ADMIN — MEDROXYPROGESTERONE ACETATE 150 MG: 150 INJECTION, SUSPENSION INTRAMUSCULAR at 15:06

## 2023-12-19 NOTE — PROGRESS NOTES
Clinic Administered Medication Documentation        Patient was given Depo Provera. Prior to medication administration, verified patient's identity using patient s name and date of birth. Please see MAR and medication order for additional information. Patient instructed to report any adverse reaction to staff immediately.    Vial/Syringe: Syringe    NEXT INJECTION DUE: 3/5/24 - 4/2/24

## 2024-01-24 ENCOUNTER — TELEPHONE (OUTPATIENT)
Dept: FAMILY MEDICINE | Facility: CLINIC | Age: 39
End: 2024-01-24
Payer: COMMERCIAL

## 2024-01-24 NOTE — TELEPHONE ENCOUNTER
Pt calling, she has had cough and cold sx for 8 days. She has tested twice at home and got negative results.  Pt said her symptoms worsened yesterday and today and the phlegm changed to green color.  Pt asking if she should come to urgent care for evaluation.  Since pt developed new and worsening symptoms advised her to come into urgent care for evaluation. Pt agrees to plan.  Missy SINGHN, RN

## 2024-01-25 ENCOUNTER — TELEPHONE (OUTPATIENT)
Dept: CARDIOLOGY | Facility: CLINIC | Age: 39
End: 2024-01-25
Payer: COMMERCIAL

## 2024-01-25 NOTE — TELEPHONE ENCOUNTER
Called and spoke with patient to schedule 2 year follow up. Patient agreeable to appointment.    Mandy RYAN, VF    (M6) obeys commands

## 2024-01-26 ENCOUNTER — E-VISIT (OUTPATIENT)
Dept: FAMILY MEDICINE | Facility: CLINIC | Age: 39
End: 2024-01-26
Payer: COMMERCIAL

## 2024-01-26 DIAGNOSIS — G43.009 MIGRAINE WITHOUT AURA AND WITHOUT STATUS MIGRAINOSUS, NOT INTRACTABLE: Primary | ICD-10-CM

## 2024-01-26 PROCEDURE — 99207 PR NON-BILLABLE SERV PER CHARTING: CPT | Performed by: FAMILY MEDICINE

## 2024-01-29 RX ORDER — SUMATRIPTAN 25 MG/1
25 TABLET, FILM COATED ORAL
Qty: 9 TABLET | Refills: 1 | Status: SHIPPED | OUTPATIENT
Start: 2024-01-29

## 2024-01-29 NOTE — PATIENT INSTRUCTIONS
Thank you for choosing us for your care. I have placed an order for a prescription so that you can start treatment. View your full visit summary for details by clicking on the link below. Your pharmacist will able to address any questions you may have about the medication.     If you're not feeling better within 5-7 days, please schedule an appointment.  You can schedule an appointment right here in [facility], or call [phone]  If the visit is for the same symptoms as your eVisit, we'll refund the cost of your eVisit if seen within seven days.    Headache: Care Instructions  Overview     Headaches have many possible causes. Most headaches aren't a sign of a more serious problem, and they will get better on their own. Home treatment may help you feel better faster.  The doctor has checked you carefully, but problems can develop later. If you notice any problems or new symptoms, get medical treatment right away.  Follow-up care is a key part of your treatment and safety. Be sure to make and go to all appointments, and call your doctor if you are having problems. It's also a good idea to know your test results and keep a list of the medicines you take.  How can you care for yourself at home?  Rest in a quiet, dark room until your headache is gone. Close your eyes and try to relax or go to sleep. Don't watch TV or read.  Put a cold, moist cloth or cold pack on the painful area for 10 to 20 minutes at a time. Put a thin cloth between the cold pack and your skin.  Use a warm, moist towel or a heating pad set on low to relax tight shoulder and neck muscles.  Have someone gently massage your neck and shoulders.  Take pain medicines exactly as directed.  If the doctor gave you a prescription medicine for pain, take it as prescribed.  If you are not taking a prescription pain medicine, ask your doctor if you can take an over-the-counter medicine.  Do not ignore new symptoms that occur with a headache, such as a fever,  weakness or numbness, vision changes, or confusion. These may be signs of a more serious problem.  To prevent headaches  Keep a headache diary so you can figure out what triggers your headaches. Avoiding triggers may help you prevent headaches. Record when each headache began, how long it lasted, and what the pain was like (throbbing, aching, stabbing, or dull). Write down any other symptoms you had with the headache, such as nausea, flashing lights or dark spots, or sensitivity to bright light or loud noise. Note if the headache occurred near your period. List anything that might have triggered the headache, such as certain foods (chocolate, cheese, wine) or odors, smoke, bright light, stress, or lack of sleep.  Find healthy ways to deal with stress. Headaches are most common during or right after stressful times. Take time to relax before and after you do something that has caused a headache in the past.  Try to keep your muscles relaxed by keeping good posture. Check your jaw, face, neck, and shoulder muscles for tension, and try relaxing them. When sitting at a desk, change positions often, and stretch for 30 seconds each hour.  Get plenty of sleep and exercise.  Eat regularly. Long periods without food can trigger a headache.  Limit caffeine by not drinking too much coffee, tea, or soda. But don't quit caffeine suddenly, because that can also give you headaches.  Reduce eyestrain from computers by blinking frequently and looking away from the computer screen every so often. Make sure you have proper eyewear and that your monitor is set up properly, about an arm's length away.  When should you call for help?   Call 911 anytime you think you may need emergency care. For example, call if:    You have signs of a stroke. These may include:  Sudden numbness, paralysis, or weakness in your face, arm, or leg, especially on only one side of your body.  Sudden vision changes.  Sudden trouble speaking.  Sudden confusion  "or trouble understanding simple statements.  Sudden problems with walking or balance.  A sudden, severe headache that is different from past headaches.   Call your doctor now or seek immediate medical care if:    You have a new or worse headache.     Your headache gets much worse.   Where can you learn more?  Go to https://www.CR[employer].net/patiented  Enter M271 in the search box to learn more about \"Headache: Care Instructions.\"  Current as of: May 1, 2023               Content Version: 13.8    8857-1791 [employer].   Care instructions adapted under license by your healthcare professional. If you have questions about a medical condition or this instruction, always ask your healthcare professional. [employer] disclaims any warranty or liability for your use of this information.      "

## 2024-01-31 NOTE — TELEPHONE ENCOUNTER
Labs orders placed.    Lisa Weller M.D.    Quality 226: Preventive Care And Screening: Tobacco Use: Screening And Cessation Intervention: Patient screened for tobacco use and is an ex/non-smoker Detail Level: Detailed Quality 130: Documentation Of Current Medications In The Medical Record: Current Medications Documented Quality 431: Preventive Care And Screening: Unhealthy Alcohol Use - Screening: Patient identified as an unhealthy alcohol user when screened for unhealthy alcohol use using a systematic screening method and received brief counseling

## 2024-02-05 ENCOUNTER — E-VISIT (OUTPATIENT)
Dept: FAMILY MEDICINE | Facility: CLINIC | Age: 39
End: 2024-02-05
Payer: COMMERCIAL

## 2024-02-05 DIAGNOSIS — K08.89 TOOTH PAIN: Primary | ICD-10-CM

## 2024-02-05 PROCEDURE — 99207 PR NON-BILLABLE SERV PER CHARTING: CPT | Performed by: FAMILY MEDICINE

## 2024-02-06 DIAGNOSIS — F51.01 PRIMARY INSOMNIA: ICD-10-CM

## 2024-02-06 ASSESSMENT — PATIENT HEALTH QUESTIONNAIRE - PHQ9
SUM OF ALL RESPONSES TO PHQ QUESTIONS 1-9: 11
SUM OF ALL RESPONSES TO PHQ QUESTIONS 1-9: 11
10. IF YOU CHECKED OFF ANY PROBLEMS, HOW DIFFICULT HAVE THESE PROBLEMS MADE IT FOR YOU TO DO YOUR WORK, TAKE CARE OF THINGS AT HOME, OR GET ALONG WITH OTHER PEOPLE: VERY DIFFICULT

## 2024-02-07 ENCOUNTER — VIRTUAL VISIT (OUTPATIENT)
Dept: FAMILY MEDICINE | Facility: CLINIC | Age: 39
End: 2024-02-07
Payer: COMMERCIAL

## 2024-02-07 DIAGNOSIS — G44.209 TENSION HEADACHE: ICD-10-CM

## 2024-02-07 DIAGNOSIS — F51.01 PRIMARY INSOMNIA: ICD-10-CM

## 2024-02-07 DIAGNOSIS — J01.00 ACUTE NON-RECURRENT MAXILLARY SINUSITIS: Primary | ICD-10-CM

## 2024-02-07 PROCEDURE — 99213 OFFICE O/P EST LOW 20 MIN: CPT | Mod: 95 | Performed by: FAMILY MEDICINE

## 2024-02-07 RX ORDER — CODEINE PHOSPHATE AND GUAIFENESIN 10; 100 MG/5ML; MG/5ML
SOLUTION ORAL
COMMUNITY
End: 2024-02-07

## 2024-02-07 RX ORDER — ZOLPIDEM TARTRATE 5 MG/1
5 TABLET ORAL
Qty: 30 TABLET | Refills: 3 | Status: SHIPPED | OUTPATIENT
Start: 2024-02-07 | End: 2024-06-24

## 2024-02-07 RX ORDER — TIZANIDINE 2 MG/1
2 TABLET ORAL 3 TIMES DAILY PRN
Qty: 40 TABLET | Refills: 1 | Status: SHIPPED | OUTPATIENT
Start: 2024-02-07

## 2024-02-07 RX ORDER — ALBUTEROL SULFATE 90 UG/1
AEROSOL, METERED RESPIRATORY (INHALATION)
COMMUNITY

## 2024-02-07 RX ORDER — ZOLPIDEM TARTRATE 5 MG/1
5 TABLET ORAL
Qty: 30 TABLET | OUTPATIENT
Start: 2024-02-07

## 2024-02-07 RX ORDER — BENZONATATE 200 MG/1
200 CAPSULE ORAL
COMMUNITY
Start: 2024-01-25 | End: 2024-02-07

## 2024-02-07 NOTE — PROGRESS NOTES
Instructions Relayed to Patient by Virtual Roomer:         Reminded patient to ensure they were logged on to virtual visit by arrival time listed. Documented in appointment notes if patient had flexibility to initiate visit sooner than arrival time. If pediatric virtual visit, ensured pediatric patient along with parent/guardian will be present for video visit.     Patient offered the website [email or web address] and/or phone number to [employer] Help line: [phone] Domitila is a 38 year old who is being evaluated via a billable video visit.      How would you like to obtain your AVS? [employer]  If the video visit is dropped, the invitation should be resent by: Text to cell phone: [phone]  Will anyone else be joining your video visit? No          Assessment & Plan     Acute non-recurrent maxillary sinusitis  Oral abx treatment  - amoxicillin-clavulanate (AUGMENTIN) 875-125 MG tablet; Take 1 tablet by mouth 2 times daily for 10 days    Tension headache  Trial of tizanidine  - tiZANidine (ZANAFLEX) 2 MG tablet; Take 1 tablet (2 mg) by mouth 3 times daily as needed for muscle spasms    Primary insomnia  Stable - continue prn use.  - zolpidem (AMBIEN) 5 MG tablet; Take 1 tablet (5 mg) by mouth nightly as needed for sleep      Depression Screening Follow Up        2/6/2024     3:39 PM   PHQ   PHQ-9 Total Score 11   Q9: Thoughts of better off dead/self-harm past 2 weeks Not at all         Follow Up Actions Taken  Patient declined referral.       The uses and side effects, including black box warnings as appropriate, were discussed in detail.  All patient questions were answered.  The patient was instructed to call immediately if any side effects developed.     Subjective   Domitila is a 38 year old, presenting for the following health issues:  Sinus Problem (Was told by dentist that she has a sinus infection and that is causing tooth pain. Other symptoms can be tolerated but is struggling with the tooth pain  caused by sinus symptoms)        2/7/2024     4:04 PM   Additional Questions   Roomed by Kamila HARDY   Accompanied by Self         2/7/2024     4:04 PM   Patient Reported Additional Medications   Patient reports taking the following new medications Tylenol w codeine     Sinus Problem     History of Present Illness       Reason for visit:  Sinus infection  Symptom onset:  1-2 weeks ago  Symptoms include:  Persistent cough, excessive mucus, migraines, toothaches, nauseous, vomiting, minor chest pain  Symptom intensity:  Moderate  Symptom progression:  Staying the same  Had these symptoms before:  Yes  Has tried/received treatment for these symptoms:  Yes  Previous treatment was successful:  No  What makes it worse:  Light, movement, eating, talking  What makes it better:  Not really    She eats 0-1 servings of fruits and vegetables daily.She consumes 2 sweetened beverage(s) daily.She exercises with enough effort to increase her heart rate 10 to 19 minutes per day.  She exercises with enough effort to increase her heart rate 3 or less days per week. She is missing 2 dose(s) of medications per week.  She is not taking prescribed medications regularly due to side effects.     Frontal headaches have continued for 4 weeks and described as pressure.  Having some pain every days. Have nasal congestion and runny nose.  Having cheek, teeth and jaw pain as well.  Seen by dentist and no teeth issues.  Has tried steam which helped initially. Tired hot and cold packs as well. Has fever and chills initially.  Still has some productive cough as well.        Objective           Vitals:  No vitals were obtained today due to virtual visit.    Physical Exam   GENERAL: alert and no distress  EYES: Eyes grossly normal to inspection.  No discharge or erythema, or obvious scleral/conjunctival abnormalities.  HENT: sounds congested  RESP: No audible wheeze, cough, or visible cyanosis.    SKIN: Visible skin clear. No significant rash, abnormal  pigmentation or lesions.  NEURO: Cranial nerves grossly intact.  Mentation and speech appropriate for age.  PSYCH: Appropriate affect, tone, and pace of words        Video-Visit Details    Type of service:  Video Visit   Video Start Time:  4:55 pm  Video End Time:5:13 PM    Originating Location (pt. Location): Home    Distant Location (provider location):  On-site  Platform used for Video Visit: Santiago  Signed Electronically by: Lisa Carney MD

## 2024-03-12 ENCOUNTER — ALLIED HEALTH/NURSE VISIT (OUTPATIENT)
Dept: FAMILY MEDICINE | Facility: CLINIC | Age: 39
End: 2024-03-12
Payer: COMMERCIAL

## 2024-03-12 VITALS
DIASTOLIC BLOOD PRESSURE: 81 MMHG | WEIGHT: 149.6 LBS | BODY MASS INDEX: 23.48 KG/M2 | SYSTOLIC BLOOD PRESSURE: 120 MMHG | HEIGHT: 67 IN

## 2024-03-12 DIAGNOSIS — Z30.42 DEPO-PROVERA CONTRACEPTIVE STATUS: Primary | ICD-10-CM

## 2024-03-12 PROCEDURE — 99207 PR NO CHARGE NURSE ONLY: CPT

## 2024-03-12 PROCEDURE — 96372 THER/PROPH/DIAG INJ SC/IM: CPT | Performed by: PHYSICIAN ASSISTANT

## 2024-03-12 RX ADMIN — MEDROXYPROGESTERONE ACETATE 150 MG: 150 INJECTION, SUSPENSION INTRAMUSCULAR at 13:08

## 2024-03-12 NOTE — PROGRESS NOTES

## 2024-03-21 ENCOUNTER — TELEPHONE (OUTPATIENT)
Dept: FAMILY MEDICINE | Facility: CLINIC | Age: 39
End: 2024-03-21
Payer: COMMERCIAL

## 2024-03-21 NOTE — TELEPHONE ENCOUNTER
Patient Quality Outreach    Patient is due for the following:   Cervical Cancer Screening - PAP Needed  Physical Preventive Adult Physical      Topic Date Due    Flu Vaccine (1) 09/01/2023    COVID-19 Vaccine (1 - 2023-24 season) Never done       Next Steps:   Schedule a Adult Preventative    Type of outreach:    Sent [employer] message.    Next Steps:  Reach out within 90 days via [employer].    Max number of attempts reached: Yes. Will try again in 90 days if patient still on fail list.    Questions for provider review:    None           Rachael Ochoa MA

## 2024-04-11 NOTE — TELEPHONE ENCOUNTER
Breastfeed on demand, every 2-3 hours or more.  Continue to wake baby for feedings including overnight until directed otherwise by your pediatrician.  Do not let infant have more than one 4 hour stretch without feeding in a 24 hour period.    Monitor wet diapers, baby should have 6-8 wet diapers per day by day 5 of life and approximately 4 or more stools.     Place infant BACK TO SLEEP at all times in a crib or bassinet.  No loose blankets, stuffed animals, or anything in crib with baby.    Make sure baby is in carseat WITHOUT coat or snowsuit, straps should be touching baby.    Call your pediatrician with any questions, or for temp above 100.4, projectile vomiting, or any yellowing of the skin or eyes.      Faxed signed form to [facility], [phone], right fax confirmed at 2:33 pm today. Copy to TC and abstracting.  Shamika Dalton MA/  [employer]

## 2024-05-30 ENCOUNTER — ALLIED HEALTH/NURSE VISIT (OUTPATIENT)
Dept: FAMILY MEDICINE | Facility: CLINIC | Age: 39
End: 2024-05-30
Payer: COMMERCIAL

## 2024-05-30 VITALS — BODY MASS INDEX: 23.45 KG/M2 | SYSTOLIC BLOOD PRESSURE: 111 MMHG | DIASTOLIC BLOOD PRESSURE: 71 MMHG | WEIGHT: 148.6 LBS

## 2024-05-30 DIAGNOSIS — Z30.42 DEPO-PROVERA CONTRACEPTIVE STATUS: Primary | ICD-10-CM

## 2024-05-30 PROCEDURE — 99207 PR NO CHARGE NURSE ONLY: CPT

## 2024-05-30 PROCEDURE — 96372 THER/PROPH/DIAG INJ SC/IM: CPT | Performed by: PHYSICIAN ASSISTANT

## 2024-05-30 RX ADMIN — MEDROXYPROGESTERONE ACETATE 150 MG: 150 INJECTION, SUSPENSION INTRAMUSCULAR at 10:14

## 2024-05-30 NOTE — PROGRESS NOTES

## 2024-06-22 ENCOUNTER — TELEPHONE (OUTPATIENT)
Dept: FAMILY MEDICINE | Facility: CLINIC | Age: 39
End: 2024-06-22
Payer: COMMERCIAL

## 2024-06-22 ENCOUNTER — HEALTH MAINTENANCE LETTER (OUTPATIENT)
Age: 39
End: 2024-06-22

## 2024-06-22 DIAGNOSIS — F51.01 PRIMARY INSOMNIA: ICD-10-CM

## 2024-06-22 NOTE — TELEPHONE ENCOUNTER
Reason for Call:  Appointment Request    Patient requesting this type of appt:  Preventive     Requested provider: Lisa Montana    Reason patient unable to be scheduled: Not with their preferred provider    When does patient want to be seen/preferred time: 1-2 weeks    Comments: Physical and pap smear     Could we send this information to you in [facility] or would you prefer to receive a phone call?:   No preference   Okay to leave a detailed message?: Yes at Cell number on file:    Telephone Information:   Mobile [phone]       Call taken on 6/22/2024 at 4:04 PM by Christina Fitch

## 2024-06-24 RX ORDER — ZOLPIDEM TARTRATE 5 MG/1
5 TABLET ORAL
Qty: 30 TABLET | Refills: 0 | Status: SHIPPED | OUTPATIENT
Start: 2024-06-24 | End: 2024-07-15

## 2024-06-24 NOTE — TELEPHONE ENCOUNTER
Called and # on file is not in service. Sent a My Chart message.  Shamika Dalton MA  [facility]   Primary Care

## 2024-07-03 ENCOUNTER — TELEPHONE (OUTPATIENT)
Dept: FAMILY MEDICINE | Facility: CLINIC | Age: 39
End: 2024-07-03
Payer: COMMERCIAL

## 2024-07-03 NOTE — LETTER
[facility]  [address] [address] [address]  [phone]  Dept: [phone]    July 3, 2024    Cruz Obregon  [address]  [facility] [address]    Dear Domitila,    At [facility] we care about your health and are committed to providing quality patient care.     Here is a list of Health Maintenance topics that are due now or due soon:  Health Maintenance Due   Topic Date Due    YEARLY PREVENTIVE VISIT  03/02/2023    PAP FOLLOW-UP  03/02/2023    HPV FOLLOW-UP  03/02/2023    ANNUAL REVIEW OF HM ORDERS  08/03/2023    COVID-19 Vaccine (1 - 2023-24 season) Never done        We are recommending that you:  Schedule a WELLNESS (Preventative/Physical) APPOINTMENT with your primary care provider. If you go elsewhere for your wellness appointments then please disregard this reminder    ,   Schedule a PAP SMEAR EXAM which is due.  Please disregard this reminder if you have had this exam elsewhere within the last year.  It would be helpful for us to have a copy of your recent pap smear report in our file so that we can best coordinate your care.    If you are under/uninsured, we recommend you contact the [facility]. They offer pap smears at no charge or on a sliding fee charge. You can schedule with them at [phone]. Please have them send us the results.    , and   Schedule a Nurse-Only appointment to update your immunizations: Your records indicate that you are not up to date with your immunizations, please schedule a nurse-only appointment to get these updated or update them at your next office visit. If this is incorrect, please disregard.    To schedule an appointment or discuss this further, you may contact us by phone at the [facility] at [phone] or online through the patient portal/[email or web address] @ [email or web address]    Thank you for trusting [facility] and we appreciate the opportunity  to serve you.  We look forward to supporting your healthcare needs in the future.    Your partners in health,      Quality Committee at [facility]

## 2024-07-03 NOTE — TELEPHONE ENCOUNTER
Patient Quality Outreach    Patient is due for the following:   Cervical Cancer Screening - PAP Needed  Physical Preventive Adult Physical      Topic Date Due    COVID-19 Vaccine (1 - 2023-24 season) Never done       Next Steps:   Schedule a Adult Preventative    Type of outreach:    Sent letter.      Questions for provider review:    None           Daiana Obregon MA

## 2024-07-15 ENCOUNTER — OFFICE VISIT (OUTPATIENT)
Dept: FAMILY MEDICINE | Facility: CLINIC | Age: 39
End: 2024-07-15
Payer: COMMERCIAL

## 2024-07-15 VITALS
HEART RATE: 69 BPM | DIASTOLIC BLOOD PRESSURE: 79 MMHG | RESPIRATION RATE: 16 BRPM | OXYGEN SATURATION: 100 % | TEMPERATURE: 97.6 F | WEIGHT: 150.4 LBS | SYSTOLIC BLOOD PRESSURE: 119 MMHG | HEIGHT: 67 IN | BODY MASS INDEX: 23.61 KG/M2

## 2024-07-15 DIAGNOSIS — Z11.3 SCREEN FOR STD (SEXUALLY TRANSMITTED DISEASE): ICD-10-CM

## 2024-07-15 DIAGNOSIS — Z12.4 CERVICAL CANCER SCREENING: ICD-10-CM

## 2024-07-15 DIAGNOSIS — Z13.9 ENCOUNTER FOR SCREENING INVOLVING SOCIAL DETERMINANTS OF HEALTH (SDOH): ICD-10-CM

## 2024-07-15 DIAGNOSIS — Z00.00 ROUTINE GENERAL MEDICAL EXAMINATION AT A HEALTH CARE FACILITY: Primary | ICD-10-CM

## 2024-07-15 DIAGNOSIS — F51.01 PRIMARY INSOMNIA: ICD-10-CM

## 2024-07-15 PROCEDURE — 87591 N.GONORRHOEAE DNA AMP PROB: CPT | Performed by: FAMILY MEDICINE

## 2024-07-15 PROCEDURE — 87491 CHLMYD TRACH DNA AMP PROBE: CPT | Performed by: FAMILY MEDICINE

## 2024-07-15 PROCEDURE — 99395 PREV VISIT EST AGE 18-39: CPT | Performed by: FAMILY MEDICINE

## 2024-07-15 PROCEDURE — 99213 OFFICE O/P EST LOW 20 MIN: CPT | Mod: 25 | Performed by: FAMILY MEDICINE

## 2024-07-15 PROCEDURE — 87624 HPV HI-RISK TYP POOLED RSLT: CPT | Performed by: FAMILY MEDICINE

## 2024-07-15 PROCEDURE — G0145 SCR C/V CYTO,THINLAYER,RESCR: HCPCS | Performed by: FAMILY MEDICINE

## 2024-07-15 RX ORDER — ZOLPIDEM TARTRATE 5 MG/1
5 TABLET ORAL
Qty: 30 TABLET | Refills: 5 | Status: SHIPPED | OUTPATIENT
Start: 2024-07-15

## 2024-07-15 SDOH — HEALTH STABILITY: PHYSICAL HEALTH: ON AVERAGE, HOW MANY MINUTES DO YOU ENGAGE IN EXERCISE AT THIS LEVEL?: 30 MIN

## 2024-07-15 SDOH — HEALTH STABILITY: PHYSICAL HEALTH: ON AVERAGE, HOW MANY DAYS PER WEEK DO YOU ENGAGE IN MODERATE TO STRENUOUS EXERCISE (LIKE A BRISK WALK)?: 3 DAYS

## 2024-07-15 ASSESSMENT — SOCIAL DETERMINANTS OF HEALTH (SDOH): HOW OFTEN DO YOU GET TOGETHER WITH FRIENDS OR RELATIVES?: ONCE A WEEK

## 2024-07-15 ASSESSMENT — PAIN SCALES - GENERAL: PAINLEVEL: NO PAIN (0)

## 2024-07-15 NOTE — PROGRESS NOTES
Preventive Care Visit  [facility]  Lisa Carney MD, Family Medicine  Jul 15, 2024      Assessment & Plan     Routine general medical examination at a health care facility  Routine preventive reviewed.  Doing well on Depo-Provera and would like to continue with this.    Encounter for screening involving social determinants of health (SDoH)  Referral for support  - Primary Care - Care Coordination Referral; Future    Cervical cancer screening  Screening  - Pap Screen with HPV - Recommended Age 30 - 65 Years    Primary insomnia  Stable - continue prn use  - zolpidem (AMBIEN) 5 MG tablet; Take 1 tablet (5 mg) by mouth every evening as needed for sleep    Screen for STD (sexually transmitted disease)  screening  - NEISSERIA GONORRHOEA PCR  - CHLAMYDIA TRACHOMATIS PCR      Counseling  Appropriate preventive services were discussed with this patient, including applicable screening as appropriate for fall prevention, nutrition, physical activity, Tobacco-use cessation, weight loss and cognition.  Checklist reviewing preventive services available has been given to the patient.  Reviewed patient's diet, addressing concerns and/or questions.   She is at risk for lack of exercise and has been provided with information to increase physical activity for the benefit of her well-being.   The patient was instructed to see the dentist every 6 months.       Moises Ramesh is a 38 year old, presenting for the following:  Physical        7/15/2024     1:44 PM   Additional Questions   Roomed by [email or web address]        Health Care Directive  Patient does not have a Health Care Directive or Living Will: Discussed advance care planning with patient; however, patient declined at this time.    HPI  Having trouble eating when feeling down or stressed.  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          7/15/2024   General Health   How would you rate your overall physical health? (!) FAIR   Feel  stress (tense, anxious, or unable to sleep) Very much      (!) STRESS CONCERN      7/15/2024   Nutrition   Three or more servings of calcium each day? Yes   Diet: Regular (no restrictions)   How many servings of fruit and vegetables per day? (!) 2-3   How many sweetened beverages each day? (!) 3            7/15/2024   Exercise   Days per week of moderate/strenous exercise 3 days   Average minutes spent exercising at this level 30 min            7/15/2024   Social Factors   Frequency of gathering with friends or relatives Once a week   Worry food won't last until get money to buy more Yes   Food not last or not have enough money for food? Yes   Do you have housing? (Housing is defined as stable permanent housing and does not include staying ouside in a car, in a tent, in an abandoned building, in an overnight shelter, or couch-surfing.) Yes   Are you worried about losing your housing? Yes   Lack of transportation? Yes   Unable to get utilities (heat,electricity)? No   Want help with housing or utility concern? (!) YES      (!) FOOD SECURITY CONCERN PRESENT (!) TRANSPORTATION CONCERN PRESENT(!) HOUSING CONCERN PRESENT      7/15/2024   Dental   Dentist two times every year? (!) NO            7/15/2024   TB Screening   Were you born outside of the [place]? No              Today's PHQ-2 Score:       2/6/2024     3:39 PM   PHQ-2 ( 1999 [employer])   Q1: Little interest or pleasure in doing things 3   Q2: Feeling down, depressed or hopeless 1   PHQ-2 Score 4   Q1: Little interest or pleasure in doing things Nearly every day   Q2: Feeling down, depressed or hopeless Several days   PHQ-2 Score 4         7/15/2024   Substance Use   Alcohol more than 3/day or more than 7/wk No   Do you use any other substances recreationally? (!) CANNABIS PRODUCTS        Social History     Tobacco Use    Smoking status: Never    Smokeless tobacco: Current   Vaping Use    Vaping status: Never Used   Substance Use Topics    Alcohol use: No      "Alcohol/week: 0.0 standard drinks of alcohol    Drug use: No                  7/15/2024   STI Screening   New sexual partner(s) since last STI/HIV test? No        History of abnormal Pap smear: YES - reflected in Problem List and Health Maintenance accordingly        Latest Ref Rng & Units 3/2/2022     9:26 AM 1/11/2021    10:15 AM 1/11/2021    10:11 AM   PAP / HPV   PAP  Negative for Intraepithelial Lesion or Malignancy (NILM)      PAP (Historical)    NIL    HPV 16 DNA Negative Negative  Negative     HPV 18 DNA Negative Negative  Negative     Other HR HPV Negative Negative  Positive             7/15/2024   Contraception/Family Planning   Questions about contraception or family planning No           Reviewed and updated as needed this visit by Provider   Tobacco  Allergies  Meds  Problems  Med Hx  Surg Hx  Fam Hx            Labs reviewed in EPIC      Review of Systems  Constitutional, HEENT, cardiovascular, pulmonary, gi and gu systems are negative, except as otherwise noted.     Objective    Exam  /79   Pulse 69   Temp 97.6  F (36.4  C) (Temporal)   Resp 16   Ht 1.702 m (5' 7\")   Wt 68.2 kg (150 lb 6.4 oz)   SpO2 100%   BMI 23.56 kg/m     Estimated body mass index is 23.56 kg/m  as calculated from the following:    Height as of this encounter: 1.702 m (5' 7\").    Weight as of this encounter: 68.2 kg (150 lb 6.4 oz).    Physical Exam  GENERAL: alert and no distress  EYES: Eyes grossly normal to inspection, PERRL and conjunctivae and sclerae normal  HENT: ear canals and TM's normal, nose and mouth without ulcers or lesions  NECK: no adenopathy, no asymmetry, masses, or scars  RESP: lungs clear to auscultation - no rales, rhonchi or wheezes  BREAST: normal without masses, tenderness or nipple discharge, no palpable axillary masses or adenopathy, and implant bilateral  CV: regular rate and rhythm, normal S1 S2, no S3 or S4, no murmur, click or rub, no peripheral edema  ABDOMEN: soft, nontender, no " hepatosplenomegaly, no masses and bowel sounds normal   (female) w/bimanual: normal female external genitalia, normal urethral meatus, normal vaginal mucosa, and normal cervix/adnexa/uterus without masses or discharge  MS: no gross musculoskeletal defects noted, no edema  SKIN: no suspicious lesions or rashes  NEURO: Normal strength and tone, mentation intact and speech normal  PSYCH: mentation appears normal, affect normal/bright        Signed Electronically by: Lisa Carney MD

## 2024-07-15 NOTE — PATIENT INSTRUCTIONS
At [facility], we strive to deliver an exceptional experience to you, every time we see you. If you receive a survey, please let us know what we are doing well and/or what we could improve upon, as we do value your feedback.  If you have MyChart, you can expect to receive results automatically within 24 hours of their completion.  Your provider will send a note interpreting your results as well.   If you do not have MyChart, you should receive your results in about a week by mail.    Your care team:                            Family Medicine Internal Medicine   MD Josh Liu, MD Lisa Weller, MD Marcus Murphy, MD Cathy Wesley, PABertramC    You Menard, MD Pediatrics   Jennifer Hernandez, MD Nickie Cota, MD Karen Yadav, APRN CNP Karen Wilkins APRN CNP   Anna Ocampo, MD Kim Morris, MD Sofie Ryder, CNP     Kentrell Kearns, CNP Same-Day Provider (No follow-up visits)   FIONA Aggarwal, DNP Stephanie Navarro, PA-C   FIONA Moore, FNP, BC MG MukherjeeC     Clinic hours: Monday - Thursday 7 am-6 pm; Fridays 7 am-5 pm.   Urgent care: Monday - Friday 10 am- 8 pm; Saturday and Sunday 9 am-5 pm.    Clinic: [phone]       [facility]: Monday - Thursday 8 am - 7 pm; Friday 8 am - 6 pm  [facility]: [phone]     Patient Education   Preventive Care Advice   This is general advice given by our system to help you stay healthy. However, your care team may have specific advice just for you. Please talk to your care team about your preventive care needs.  Nutrition  Eat 5 or more servings of fruits and vegetables each day.  Try wheat bread, brown rice and whole grain pasta (instead of white bread, rice, and pasta).  Get enough calcium and vitamin D. Check the label on foods and aim for 100% of the RDA (recommended daily allowance).  Lifestyle  Exercise at least 150  minutes each week  (30 minutes a day, 5 days a week).  Do muscle strengthening activities 2 days a week. These help control your weight and prevent disease.  No smoking.  Wear sunscreen to prevent skin cancer.  Have a dental exam and cleaning every 6 months.  Yearly exams  See your health care team every year to talk about:  Any changes in your health.  Any medicines your care team has prescribed.  Preventive care, family planning, and ways to prevent chronic diseases.  Shots (vaccines)   HPV shots (up to age 26), if you've never had them before.  Hepatitis B shots (up to age 59), if you've never had them before.  COVID-19 shot: Get this shot when it's due.  Flu shot: Get a flu shot every year.  Tetanus shot: Get a tetanus shot every 10 years.  Pneumococcal, hepatitis A, and RSV shots: Ask your care team if you need these based on your risk.  Shingles shot (for age 50 and up)  General health tests  Diabetes screening:  Starting at age 35, Get screened for diabetes at least every 3 years.  If you are younger than age 35, ask your care team if you should be screened for diabetes.  Cholesterol test: At age 39, start having a cholesterol test every 5 years, or more often if advised.  Bone density scan (DEXA): At age 50, ask your care team if you should have this scan for osteoporosis (brittle bones).  Hepatitis C: Get tested at least once in your life.  STIs (sexually transmitted infections)  Before age 24: Ask your care team if you should be screened for STIs.  After age 24: Get screened for STIs if you're at risk. You are at risk for STIs (including HIV) if:  You are sexually active with more than one person.  You don't use condoms every time.  You or a partner was diagnosed with a sexually transmitted infection.  If you are at risk for HIV, ask about PrEP medicine to prevent HIV.  Get tested for HIV at least once in your life, whether you are at risk for HIV or not.  Cancer screening tests  Cervical cancer screening:  If you have a cervix, begin getting regular cervical cancer screening tests starting at age 21.  Breast cancer scan (mammogram): If you've ever had breasts, begin having regular mammograms starting at age 40. This is a scan to check for breast cancer.  Colon cancer screening: It is important to start screening for colon cancer at age 45.  Have a colonoscopy test every 10 years (or more often if you're at risk) Or, ask your provider about stool tests like a FIT test every year or Cologuard test every 3 years.  To learn more about your testing options, visit:   .  For help making a decision, visit:   https://bit.ly/jk41586.  Prostate cancer screening test: If you have a prostate, ask your care team if a prostate cancer screening test (PSA) at age 55 is right for you.  Lung cancer screening: If you are a current or former smoker ages 50 to 80, ask your care team if ongoing lung cancer screenings are right for you.  For informational purposes only. Not to replace the advice of your health care provider. Copyright   2023 [facility]. All rights reserved. Clinically reviewed by the [facility] Transitions Program. [employer] 788690 - REV 01/24.  Learning About Stress  What is stress?     Stress is your body's response to a hard situation. Your body can have a physical, emotional, or mental response. Stress is a fact of life for most people, and it affects everyone differently. What causes stress for you may not be stressful for someone else.  A lot of things can cause stress. You may feel stress when you go on a job interview, take a test, or run a race. This kind of short-term stress is normal and even useful. It can help you if you need to work hard or react quickly. For example, stress can help you finish an important job on time.  Long-term stress is caused by ongoing stressful situations or events. Examples of long-term stress include long-term health problems, ongoing problems at work, or  conflicts in your family. Long-term stress can harm your health.  How does stress affect your health?  When you are stressed, your body responds as though you are in danger. It makes hormones that speed up your heart, make you breathe faster, and give you a burst of energy. This is called the fight-or-flight stress response. If the stress is over quickly, your body goes back to normal and no harm is done.  But if stress happens too often or lasts too long, it can have bad effects. Long-term stress can make you more likely to get sick, and it can make symptoms of some diseases worse. If you tense up when you are stressed, you may develop neck, shoulder, or low back pain. Stress is linked to high blood pressure and heart disease.  Stress also harms your emotional health. It can make you helm, tense, or depressed. Your relationships may suffer, and you may not do well at work or school.  What can you do to manage stress?  You can try these things to help manage stress:   Do something active. Exercise or activity can help reduce stress. Walking is a great way to get started. Even everyday activities such as housecleaning or yard work can help.  Try yoga or rafael chi. These techniques combine exercise and meditation. You may need some training at first to learn them.  Do something you enjoy. For example, listen to music or go to a movie. Practice your hobby or do volunteer work.  Meditate. This can help you relax, because you are not worrying about what happened before or what may happen in the future.  Do guided imagery. Imagine yourself in any setting that helps you feel calm. You can use online videos, books, or a teacher to guide you.  Do breathing exercises. For example:  From a standing position, bend forward from the waist with your knees slightly bent. Let your arms dangle close to the floor.  Breathe in slowly and deeply as you return to a standing position. Roll up slowly and lift your head last.  Hold your  "breath for just a few seconds in the standing position.  Breathe out slowly and bend forward from the waist.  Let your feelings out. Talk, laugh, cry, and express anger when you need to. Talking with supportive friends or family, a counselor, or a kathryn leader about your feelings is a healthy way to relieve stress. Avoid discussing your feelings with people who make you feel worse.  Write. It may help to write about things that are bothering you. This helps you find out how much stress you feel and what is causing it. When you know this, you can find better ways to cope.  What can you do to prevent stress?  You might try some of these things to help prevent stress:  Manage your time. This helps you find time to do the things you want and need to do.  Get enough sleep. Your body recovers from the stresses of the day while you are sleeping.  Get support. Your family, friends, and community can make a difference in how you experience stress.  Limit your news feed. Avoid or limit time on social media or news that may make you feel stressed.  Do something active. Exercise or activity can help reduce stress. Walking is a great way to get started.  Where can you learn more?  Go to https://www.PopSeal.net/patiented  Enter N032 in the search box to learn more about \"Learning About Stress.\"  Current as of: October 24, 2023               Content Version: 14.0    1637-4496 [employer].   Care instructions adapted under license by your healthcare professional. If you have questions about a medical condition or this instruction, always ask your healthcare professional. [employer] disclaims any warranty or liability for your use of this information.      Substance Use Disorder: Care Instructions  Overview     You can improve your life and health by stopping your use of alcohol or drugs. When you don't drink or use drugs, you may feel and sleep better. You may get along better with your family, " friends, and coworkers. There are medicines and programs that can help with substance use disorder.  How can you care for yourself at home?  Here are some ways to help you stay sober and prevent relapse.  If you have been given medicine to help keep you sober or reduce your cravings, be sure to take it exactly as prescribed.  Talk to your doctor about programs that can help you stop using drugs or drinking alcohol.  Do not keep alcohol or drugs in your home.  Plan ahead. Think about what you'll say if other people ask you to drink or use drugs. Try not to spend time with people who drink or use drugs.  Use the time and money spent on drinking or drugs to do something that's important to you.  Preventing a relapse  Have a plan to deal with relapse. Learn to recognize changes in your thinking that lead you to drink or use drugs. Get help before you start to drink or use drugs again.  Try to stay away from situations, friends, or places that may lead you to drink or use drugs.  If you feel the need to drink alcohol or use drugs again, seek help right away. Call a trusted friend or family member. Some people get support from organizations such as Narcotics Anonymous or [employer] or from treatment facilities.  If you relapse, get help as soon as you can. Some people make a plan with another person that outlines what they want that person to do for them if they relapse. The plan usually includes how to handle the relapse and who to notify in case of relapse.  Don't give up. Remember that a relapse doesn't mean that you have failed. Use the experience to learn the triggers that lead you to drink or use drugs. Then quit again. Recovery is a lifelong process. Many people have several relapses before they are able to quit for good.  Follow-up care is a key part of your treatment and safety. Be sure to make and go to all appointments, and call your doctor if you are having problems. It's also a good idea to know your test  "results and keep a list of the medicines you take.  When should you call for help?   Call 911  anytime you think you may need emergency care. For example, call if you or someone else:    Has overdosed or has withdrawal signs. Be sure to tell the emergency workers that you are or someone else is using or trying to quit using drugs. Overdose or withdrawal signs may include:  Losing consciousness.  Seizure.  Seeing or hearing things that aren't there (hallucinations).     Is thinking or talking about suicide or harming others.   Where to get help 24 hours a day, 7 days a week   If you or someone you know talks about suicide, self-harm, a mental health crisis, a substance use crisis, or any other kind of emotional distress, get help right away. You can:    Call the Suicide and Crisis Lifeline at 988.     Call 7-287-770-TALK (1-585.314.9710).     Text HOME to 538023 to access the Crisis Text Line.   Consider saving these numbers in your phone.  Go to Tripeese for more information or to chat online.  Call your doctor now or seek immediate medical care if:    You are having withdrawal symptoms. These may include nausea or vomiting, sweating, shakiness, and anxiety.   Watch closely for changes in your health, and be sure to contact your doctor if:    You have a relapse.     You need more help or support to stop.   Where can you learn more?  Go to https://www.Find That File.net/patiented  Enter H573 in the search box to learn more about \"Substance Use Disorder: Care Instructions.\"  Current as of: November 15, 2023               Content Version: 14.0    5490-9565 [employer].   Care instructions adapted under license by your healthcare professional. If you have questions about a medical condition or this instruction, always ask your healthcare professional. [employer] disclaims any warranty or liability for your use of this information.         "

## 2024-07-16 ENCOUNTER — PATIENT OUTREACH (OUTPATIENT)
Dept: CARE COORDINATION | Facility: CLINIC | Age: 39
End: 2024-07-16
Payer: COMMERCIAL

## 2024-07-16 LAB
C TRACH DNA SPEC QL NAA+PROBE: NEGATIVE
HPV HR 12 DNA CVX QL NAA+PROBE: NEGATIVE
HPV16 DNA CVX QL NAA+PROBE: NEGATIVE
HPV18 DNA CVX QL NAA+PROBE: NEGATIVE
HUMAN PAPILLOMA VIRUS FINAL DIAGNOSIS: NORMAL
N GONORRHOEA DNA SPEC QL NAA+PROBE: NEGATIVE

## 2024-07-16 NOTE — PROGRESS NOTES
Clinic Care Coordination Contact  Community Health Worker Initial Outreach    CHW Initial Information Gathering:  Referral Source: PCP  Preferred Hospital: Other ([facility])  Preferred Urgent Care: [facility], [phone]  Current living arrangement:: I live in a private home with family  Type of residence:: Town home  Community Resources: Financial/Insurance, County Programs (SNAP)  Supplies Currently Used at Home: None  Equipment Currently Used at Home: none  Informal Support system:: None  No PCP office visit in Past Year: No  Transportation means:: Family, Friend       Patient accepts CC: Yes. Patient scheduled for assessment with the SW on 7/18/24 at 11:00 am. Patient noted desire to discuss food, housing and transportation supports.     SIDRA ArchibaldW  [phone]  [facility] Care Resource [facility]

## 2024-07-18 ENCOUNTER — PATIENT OUTREACH (OUTPATIENT)
Dept: NURSING | Facility: CLINIC | Age: 39
End: 2024-07-18
Payer: COMMERCIAL

## 2024-07-18 LAB
BKR LAB AP GYN ADEQUACY: NORMAL
BKR LAB AP GYN INTERPRETATION: NORMAL
BKR LAB AP PREVIOUS ABNORMAL: NORMAL
PATH REPORT.COMMENTS IMP SPEC: NORMAL
PATH REPORT.COMMENTS IMP SPEC: NORMAL
PATH REPORT.RELEVANT HX SPEC: NORMAL

## 2024-07-18 ASSESSMENT — ACTIVITIES OF DAILY LIVING (ADL): DEPENDENT_IADLS:: INDEPENDENT

## 2024-07-18 NOTE — LETTER
[facility] CARE COORDINATION        July 18, 2024    Cassydomitila Obregon  [address]  [facility] [address]      Dear Domitila,    I am a clinic care coordinator who works with Lisa Carney MD with the [facility]. I wanted to thank you for spending the time to talk with me.  Below is a description of clinic care coordination and how I can further assist you.       The clinic care coordination team is made up of a registered nurse, , financial resource worker and community health worker who understand the health care system. The goal of clinic care coordination is to help you manage your health and improve access to the health care system. Our team works alongside your provider to assist you in determining your health and social needs. We can help you obtain health care and community resources, providing you with necessary information and education. We can work with you through any barriers and develop a care plan that helps coordinate and strengthen the communication between you and your care team.  Our services are voluntary and are offered without charge to you personally.    Please feel free to contact me with any questions or concerns regarding care coordination and what we can offer.      We are focused on providing you with the highest-quality healthcare experience possible.    Sincerely,     Selene Jara, [facility]   Social Work Primary Care Clinic Care Coordinator   Brit HERRING [phone]     Enclosed: I have enclosed a copy of the Patient Centered Plan of Care. This has helpful information and goals that we have talked about. Please keep this in an easy to access place to use as needed.

## 2024-07-18 NOTE — LETTER
[facility]  Patient Centered Plan of Care  About Me:        Patient Name:  Cruz Pereira    YOB: 1985  Age:         38 year old   Angelique MRN:    [id number] Telephone Information:  Home Phone Not on file.   Mobile [phone]       Address:  [address]  [facility] [address] Email address:  [email or web address]      Emergency Contact(s)    Name Relationship Lgl Grd Work Phone Home Phone Mobile Phone   1. FALLON PEREIRA Sibling    [phone]   2. ROMAN PEREIRA Father   [phone]           Primary language:  English     needed? No   [employer]:  [phone] op. 1  Other communication barriers:None  Preferred Method of Communication:  Mail  Current living arrangement: I live in a private home with family  Mobility Status/ Medical Equipment: Independent    Health Maintenance  Health Maintenance Reviewed: Due/Overdue   Health Maintenance Due   Topic Date Due    PAP FOLLOW-UP  03/02/2023    COVID-19 Vaccine (1 - 2023-24 season) Never done     My Access Plan  Medical Emergency 911   Primary Clinic Line [facility] [phone]   24 Hour Appointment Line [phone] or  [phone]) (toll-free)   24 Hour Nurse Line [phone] (toll-free)   Preferred Urgent Care [facility], [phone]     Preferred Hospital Other (Critical access hospital Emergency/Urgent Care)     Preferred Pharmacy [id number] [address] [facility]     Behavioral Health Crisis Line The National Suicide Prevention Lifeline at 1-587.879.9442 or Text/Call 628     My Care Team Members  Patient Care Team         Relationship Specialty Notifications Start End    Branch Lisa Carney MD PCP - General Family Medicine  2/7/24     Phone: [phone] Fax: [phone]         [address] [facility] [address]    Eron Duval  MD FRAN[facility]  10/1/19     Phone: [phone] Fax: [phone]         [address] [facility] [address] [facility] [address]    Jarret Matson OD Assigned Surgical Provider   10/23/20     Phone: [phone] Fax: [phone]         [address] [facility] [address]    Mary Esther Lisa Carney MD Assigned PCP   1/9/22     Phone: [phone] Fax: [phone]         [address] [facility] [address]    Brit Fink CHW Community Health Worker Primary Care - [facility] Admissions 7/15/24     Phone: [phone] Fax: [phone]        Selene Jara LSW Lead Care Coordinator Primary Care - [facility] Admissions 7/16/24     Phone: [phone] [address] [facility] [address]                My Care Plans  Self Management and Treatment Plan    Care Plan  Care Plan: General       Problem: Resources       Goal: Gather resources       Start Date: 7/18/2024 Expected End Date: 12/1/2024    Priority: High    Note:     Barriers: Access   Strengths: Motivated   Patient expressed understanding of goal: Yes     Action steps to achieve this goal:  1. I will connect with community resources that are available in my area.   2. I will access [employer] program.   3. I will work with care coordination as needed.                                Action Plans on File:                       Advance Care Plans/Directives:   Advanced Care Plan/Directives on file:   No    Discussed with patient/caregiver(s):   Referral to [facility]         [facility]    Advance Care Planning and Health Care Directives  When it comes to decisions about your health care, it s important that your voice is heard. You may not always be able to speak for yourself.    We encourage you to have discussions with your loved ones, cultural or spiritual leaders and health care providers about your goals, values, beliefs and choices.    We are a part of [employer] [place] , supporting and promoting the benefits  of advance care planning conversations.    Our goals are to:  Help you make informed decisions about your healthcare choices and ensure that those choices are honored  Offer advance care planning discussions with trained staff  Ensure your choices are clearly defined, documented and available in your medical record  Translate your choices into medical orders as needed    Why is Advance Care Planning important?  Know what your health care choices are and decide what is right for you  An unexpected illness or injury could leave you unable to participate in important treatment decisions  When choices are left to others to decide that responsibility can be difficult and stressful  By discussing and outlining your choices, your voice is heard in the care you want to receive    How can I learn more?  We offer free classes at multiple locations, days and times. Our trained facilitators will provide information and guide you through a Health Care Directive document. They can also review, notarize and add your document to your medical record.    Call [employer] at [phone] or toll free at [phone] for assistance.        My Medical and Care Information  Problem List   Patient Active Problem List   Diagnosis    CARDIOVASCULAR SCREENING; LDL GOAL LESS THAN 160    Mild Mitral insufficiency    Mild Tricuspid insufficiency    Bacterial vaginosis    Elevated antinuclear antibody (CAMILO) level    Tension headache    Hypersomnia    Joint pain    Paresthesias, right hand    Chronic daily headache    Cervical high risk HPV (human papillomavirus) test positive    Frequent menstruation    Chest pain    Low back pain    Esophageal reflux (GERD)    History of 2019 novel coronavirus disease (COVID-19)      Current Medications and Allergies:   Current Outpatient Medications   Medication Instructions    fluticasone (FLONASE) 50 MCG/ACT nasal spray 2 sprays, Both Nostrils, DAILY    Loratadine (CLARITIN PO) Oral     medroxyPROGESTERone (DEPO-PROVERA) 150 mg, Intramuscular, EVERY 3 MONTHS    olopatadine (PATADAY) 0.2 % ophthalmic solution 1 drop, Both Eyes, DAILY    polyethylene glycol-propylene glycol (SYSTANE ULTRA) 0.4-0.3 % SOLN ophthalmic solution 1 drop, Both Eyes, 4 TIMES DAILY    SUMAtriptan (IMITREX) 25 mg, Oral, AT ONSET OF HEADACHE, 1-2 tablets (25-50 mg) by mouth at onset of headache for migraine. May repeat in 2 hours. Max 8 tablets/24hours    tiZANidine (ZANAFLEX) 2 mg, Oral, 3 TIMES DAILY PRN    VENTOLIN  (90 Base) MCG/ACT inhaler INHALE 2 PUFFS BY MOUTH EVERY 4 HOURS AS NEEDED FOR SHORTNESS OF BREATH OR WHEEZING OR COUGH    zolpidem (AMBIEN) 5 mg, Oral, EVERY EVENING PRN     Care Coordination Start Date: 7/15/2024   Frequency of Care Coordination: monthly, more frequently as needed     Form Last Updated: 07/18/2024
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
## 2024-08-12 ENCOUNTER — PATIENT OUTREACH (OUTPATIENT)
Dept: CARE COORDINATION | Facility: CLINIC | Age: 39
End: 2024-08-12
Payer: COMMERCIAL

## 2024-08-12 NOTE — PROGRESS NOTES
[facility]/Voicemail    Clinical Data: Care Coordinator Outreach    Outreach Documentation Number of Outreach Attempt   8/12/2024  12:28 PM 1       Left message on patient's voicemail with call back information and requested return call.    Plan:   Care Coordinator will try to reach patient again in 3-5 business days.    Next outreach due 8/19/24

## 2024-08-23 ENCOUNTER — PATIENT OUTREACH (OUTPATIENT)
Dept: CARE COORDINATION | Facility: CLINIC | Age: 39
End: 2024-08-23
Payer: COMMERCIAL

## 2024-08-23 NOTE — LETTER
[facility]  [id number]  [facility] [address]    August 23, 2024    Cruz Obregon  [address]  [facility] [address]      Dear Cruz,    I am a clinic community health worker who works with Lisa Montana with the [facility]. I wanted to thank you for spending the time to talk with me.  Below is information regarding the resources we discussed:     Mobile Market :  [email or web address])/[facility]-mobile-market/      Fare For All    [email or web address]-[email or web address]-[email or web address]  Please feel free to contact me with any questions or concerns.           Sincerely,    NEVAEH Watkins, [place], Brice Alexandre Fridley and [facility]  [phone]

## 2024-08-23 NOTE — PROGRESS NOTES
Clinic Care Coordination Contact  Community Health Worker Follow Up    Care Gaps:     Health Maintenance Due   Topic Date Due    COVID-19 Vaccine (1 - 2023-24 season) Never done       Will discuss with PCP    Care Plan:   Care Plan: General       Problem: Resources       Goal: Gather resources       Start Date: 7/18/2024 Expected End Date: 12/1/2024    This Visit's Progress: 20%    Priority: High    Note:     Barriers: Access   Strengths: Motivated   Patient expressed understanding of goal: Yes     Action steps to achieve this goal:  1. I will connect with community resources that are available in my area.   2. I will access [employer] program.   3. I will work with care coordination as needed.                                Intervention and Education during outreach: Patient is doing well, she was approved for Market Rx but hasn't utilized it yet due to transportation. CHW will send Mobile Market information as well as Fare For All sites so that she is able to arrange a ride.    CHW Plan: CHW will continue to support patient with goals through routine scheduled outreach.     Next outreach due: 9/26/24

## 2024-09-04 ENCOUNTER — PATIENT OUTREACH (OUTPATIENT)
Dept: CARE COORDINATION | Facility: CLINIC | Age: 39
End: 2024-09-04
Payer: COMMERCIAL

## 2024-09-04 NOTE — PROGRESS NOTES
Clinic Care Coordination Contact  Care Coordination Clinician Chart Review    Situation: Patient chart reviewed by Care Coordinator.       Background: Care Coordination Program started: 7/15/2024. Initial assessment completed and patient-centered care plan(s) were developed with participation from patient. Lead CC handed patient off to CHW for continued outreaches.       Assessment: Per chart review, patient outreach completed by CC CHW on 8/23/24.  Per CHW: Patient is doing well, she was approved for Market Rx but hasn't utilized it yet due to transportation. CHW will send Mobile Market information as well as Fare For All sites so that she is able to arrange a ride. Patient is actively working to accomplish goal(s). Patient's goal(s) appropriate and relevant at this time. Patient is not due for updated Plan of Care.  Assessments will be completed annually or as needed/with change of patient status.      Care Plan: General       Problem: Resources       Goal: Gather resources       Start Date: 7/18/2024 Expected End Date: 12/1/2024    This Visit's Progress: 40% Recent Progress: 20%    Priority: High    Note:     Barriers: Access   Strengths: Motivated   Patient expressed understanding of goal: Yes     Action steps to achieve this goal:  1. I will connect with community resources that are available in my area.   2. I will access [place] [employer] program.   3. I will work with care coordination as needed.                                   Plan/Recommendations: The patient will continue working with Care Coordination to achieve goal(s) as above. CHW will continue outreaches at minimum every 30 days and will involve Lead CC as needed or if patient is ready to move to Maintenance. Lead CC will continue to monitor CHW outreaches and patient's progress to goal(s) every 6 weeks.     Plan of Care updated and sent to patient: ALEISHA Flores  Social Work Primary Care Clinic Care Coordinator  [facility]  [facility]   [phone]  [email or web address]

## 2024-09-13 ENCOUNTER — ALLIED HEALTH/NURSE VISIT (OUTPATIENT)
Dept: FAMILY MEDICINE | Facility: CLINIC | Age: 39
End: 2024-09-13
Payer: COMMERCIAL

## 2024-09-13 DIAGNOSIS — Z30.42 DEPO-PROVERA CONTRACEPTIVE STATUS: ICD-10-CM

## 2024-09-13 DIAGNOSIS — Z30.9 CONTRACEPTION MANAGEMENT: Primary | ICD-10-CM

## 2024-09-13 LAB — HCG UR QL: NEGATIVE

## 2024-09-13 PROCEDURE — 96372 THER/PROPH/DIAG INJ SC/IM: CPT | Performed by: INTERNAL MEDICINE

## 2024-09-13 PROCEDURE — 81025 URINE PREGNANCY TEST: CPT

## 2024-09-13 PROCEDURE — 99207 PR NO CHARGE NURSE ONLY: CPT

## 2024-09-13 RX ORDER — MEDROXYPROGESTERONE ACETATE 150 MG/ML
150 INJECTION, SUSPENSION INTRAMUSCULAR
Status: ACTIVE | OUTPATIENT
Start: 2024-09-13 | End: 2025-03-12

## 2024-09-13 RX ADMIN — MEDROXYPROGESTERONE ACETATE 150 MG: 150 INJECTION, SUSPENSION INTRAMUSCULAR at 14:16

## 2024-09-13 NOTE — PROGRESS NOTES
Clinic Administered Medication Documentation      Depo Provera Documentation    Depo-Provera Standing Order inclusion/exclusion criteria reviewed.     Is this the initial or subsequent dose of Depo Provera? Subsequent dose - patient is not within the acceptable window of time (11-15 weeks) for subsequent injection. Pregnancy test is indicated. Pregnancy test result: negative       Patient meets: inclusion criteria     Is there an active order (written within the past 365 days, with administrations remaining, not ) in the chart? Yes.     Prior to injection, verified patient identity using patient's name and date of birth. Medication was administered. Please see MAR and medication order for additional information.     Vial/Syringe: Single dose vial. Was entire vial of medication used? Yes    Patient instructed to remain in clinic for 15 minutes and report any adverse reaction to staff immediately.    NEXT INJECTION DUE: 24 - 24    Verified that the patient has refills remaining in their prescription.     Olga Duval MA

## 2024-10-01 ENCOUNTER — PATIENT OUTREACH (OUTPATIENT)
Dept: CARE COORDINATION | Facility: CLINIC | Age: 39
End: 2024-10-01
Payer: COMMERCIAL

## 2024-10-01 NOTE — PROGRESS NOTES
Clinic Care Coordination Contact  Community Health Worker Follow Up    Care Gaps:     Health Maintenance Due   Topic Date Due    INFLUENZA VACCINE (1) 09/01/2024    COVID-19 Vaccine (1 - 2024-25 season) Never done    GLUCOSE  09/29/2024    EYE EXAM  10/05/2024       Reminded patient she needs follow up visit.     Care Plan:   Care Plan: General       Problem: Resources       Goal: Gather resources       Start Date: 7/18/2024 Expected End Date: 12/1/2024    This Visit's Progress: 50% Recent Progress: 40%    Priority: High    Note:     Barriers: Access   Strengths: Motivated   Patient expressed understanding of goal: Yes     Action steps to achieve this goal:  1. I will connect with community resources that are available in my area.   2. I will access iMedicare program.   3. I will work with care coordination as needed.                                Intervention and Education during outreach: Patient states that she has not used the Market Rx benefit yet. Reminded her that if she hasn't used it after 6 months she will be unenrolled. She doesn't remember seeing the information for the Mobile Market. CHW will resend information via [employer].    CHW Plan: CHW will continue to support patient with goals through routine scheduled outreach.     Next outreach due:  10/31/24

## 2024-10-01 NOTE — LETTER
[facility]  [address]  [facility] [address]    October 1, 2024    Cruz Obregon  [address]  [facility] [address]      Dear Cruz,    I am a clinic community health worker who works with Lisa Montana with the [facility]. I wanted to thank you for spending the time to talk with me.  Below is information regarding the resources we discussed:       [email or web address]-[employer]-market/    Please feel free to contact me with any questions or concerns.           Sincerely,    NEVAEH Watkins  [place], [place], Brice Alexandre Fridley and [facility]  [phone]

## 2024-10-15 ENCOUNTER — PATIENT OUTREACH (OUTPATIENT)
Dept: CARE COORDINATION | Facility: CLINIC | Age: 39
End: 2024-10-15

## 2024-10-15 NOTE — LETTER
[facility]  [address]  [facility] [address]    October 15, 2024        Cruz Pereira  [address] [place] [address]          Dear Uriel Arroyo is an updated Patient Centered Plan of Care for your continued enrollment in Care Coordination. Please let us know if you have additional questions, concerns, or goals that we can assist with.    Sincerely,    ALEISHA Sofia  Social Work Primary Care Clinic Care Coordinator  [facility]  [phone]  [email or web address]  Patient Centered Plan of Care  About Me:        Patient Name:  Cruz Pereira    YOB: 1985  Age:         39 year old   [place] MRN:    [id number] Telephone Information:  Home Phone Not on file.   Mobile [phone]       [address]  [address] [facility] [address] Email address:  [email or web address]      Emergency Contact(s)    Name Relationship Lgl Grd Work Phone Home Phone Mobile Phone   1. FALLON PEREIRA Sibling    [phone]   2. ROMAN PEREIRA Father   [phone]           Primary language:  English     needed? No   [place] [employer]:  [phone] op. 1  Other communication barriers:None    Preferred Method of Communication:  Mail  Current living arrangement: I live in a private home with family    Mobility Status/ Medical Equipment: Independent    Health Maintenance  Health Maintenance Reviewed: Due/Overdue   Health Maintenance Due   Topic Date Due    INFLUENZA VACCINE (1) 09/01/2024    COVID-19 Vaccine (1 - 2024-25 season) Never done    GLUCOSE  09/29/2024    EYE EXAM  10/05/2024       My Access Plan  Medical Emergency 911   Primary Clinic Line [facility] - [phone]   24 Hour Appointment Line [phone] or  [phone] ([phone]) (toll-free)   24 Hour Nurse Line  [phone] (toll-free)   Preferred Urgent Care [facility], [phone]     Preferred Hospital Other ([facility] Emergency/Urgent Care)     Preferred Pharmacy [id number] - [place], [place] - [address] [place] [address] AT [address] Behavioral Health Crisis Line The National Suicide Prevention Lifeline at 1-359.887.9121 or Text/Call 768           My Care Team Members  Patient Care Team         Relationship Specialty Notifications Start End    Lisa Montana MD PCP - General Family Medicine  2/7/24     Phone: [phone] Fax: [phone]         [address] [facility] [address]    Eron Duval MD MD Neurology  10/1/19     Phone: [phone] Fax: [phone]         [address] [facility] [address] [facility] [address]    Jarret Matson OD Assigned Surgical Provider   10/23/20     Phone: [phone] Fax: [phone]         [address] [facility] [address]    Lisa Montana MD Assigned PCP   1/9/22     Phone: [phone] Fax: [phone]         [address] [facility] [address]    Brit Fink CHW Community Health Worker Primary Care - CC Admissions 7/15/24     Phone: [phone] Fax: [phone]        Lauren Burdick BSW Lead Care Coordinator Primary Care - CC Admissions 7/25/24     Jarret Matson OD  Optometry  10/14/24     Phone: [phone] Fax: [phone]         [address] [facility] [address]              My Care Plans  Self Management and Treatment Plan    Care Plan  Care Plan: General       Problem: Resources       Goal: Gather resources       Start Date: 7/18/2024 Expected End Date: 12/1/2024    This Visit's Progress: 50% Recent Progress: 40%    Priority: High    Note:     Barriers: Access   Strengths: Motivated   Patient expressed understanding of goal: Yes     Action steps to achieve this goal:  1. I will connect with community resources that are available in my  area.   2. I will access [employer].   3. I will work with care coordination as needed.                                Advance Care Plans/Directives:   Advanced Care Plan/Directives on file:   No    Discussed with patient/caregiver(s):   Referral to Honoring Choices             My Medical and Care Information  Problem List   Patient Active Problem List   Diagnosis    CARDIOVASCULAR SCREENING; LDL GOAL LESS THAN 160    Mild Mitral insufficiency    Mild Tricuspid insufficiency    Bacterial vaginosis    Elevated antinuclear antibody (CAMILO) level    Tension headache    Hypersomnia    Joint pain    Paresthesias, right hand    Chronic daily headache    Cervical high risk HPV (human papillomavirus) test positive    Frequent menstruation    Chest pain    Low back pain    Esophageal reflux (GERD)    History of 2019 novel coronavirus disease (COVID-19)      Current Medications and Allergies:  See printed Medication Report.    Care Coordination Start Date: 7/15/2024   Frequency of Care Coordination: 6 weeks, more frequently as needed     Form Last Updated: 10/15/2024

## 2024-10-15 NOTE — PROGRESS NOTES
Clinic Care Coordination Contact  Care Coordination Clinician Chart Review    Situation: Patient chart reviewed by Care Coordinator.       Background: Care Coordination Program started: 7/15/2024. Initial assessment completed and patient-centered care plan(s) were developed with participation from patient. Lead CC handed patient off to CHW for continued outreaches.       Assessment: Per chart review, patient outreach completed by CC CHW on 10/1/24. Per CHW,  Patient states that she has not used the Market Rx benefit yet. Reminded her that if she hasn't used it after 6 months she will be unenrolled. She doesn't remember seeing the information for the Mobile Market. CHW will resend information via [employer].  Patient is not actively working to accomplish goal(s). Patient's goal(s) appropriate and relevant at this time. Patient is due for updated Plan of Care.  Assessments will be completed annually or as needed/with change of patient status.      Care Plan: General       Problem: Resources       Goal: Gather resources       Start Date: 7/18/2024 Expected End Date: 12/1/2024    This Visit's Progress: 50% Recent Progress: 40%    Priority: High    Note:     Barriers: Access   Strengths: Motivated   Patient expressed understanding of goal: Yes     Action steps to achieve this goal:  1. I will connect with community resources that are available in my area.   2. I will access [employer] program.   3. I will work with care coordination as needed.                                   Plan/Recommendations: The patient will continue working with Care Coordination to achieve goal(s) as above. CHW will continue outreaches at minimum every 30 days and will involve Lead CC as needed or if patient is ready to move to Maintenance. Lead CC will continue to monitor CHW outreaches and patient's progress to goal(s) every 6 weeks.     Plan of Care updated and sent to patient: Yes, via mail    ALEISHA Sofia  Social Work Primary Care  Clinic Care Coordinator  [facility]  [phone]  [email or web address]

## 2024-10-24 ENCOUNTER — OFFICE VISIT (OUTPATIENT)
Dept: OPTOMETRY | Facility: CLINIC | Age: 39
End: 2024-10-24
Payer: COMMERCIAL

## 2024-10-24 DIAGNOSIS — Z01.00 EXAMINATION OF EYES AND VISION: Primary | ICD-10-CM

## 2024-10-24 DIAGNOSIS — H52.03 HYPEROPIA, BILATERAL: ICD-10-CM

## 2024-10-24 DIAGNOSIS — H04.123 DRY EYE SYNDROME OF BOTH EYES: ICD-10-CM

## 2024-10-24 DIAGNOSIS — H10.13 ALLERGIC CONJUNCTIVITIS OF BOTH EYES: ICD-10-CM

## 2024-10-24 PROCEDURE — 92014 COMPRE OPH EXAM EST PT 1/>: CPT | Performed by: OPTOMETRIST

## 2024-10-24 PROCEDURE — 92015 DETERMINE REFRACTIVE STATE: CPT | Performed by: OPTOMETRIST

## 2024-10-24 RX ORDER — POLYETHYLENE GLYCOL 400 AND PROPYLENE GLYCOL 4; 3 MG/ML; MG/ML
1 SOLUTION/ DROPS OPHTHALMIC 4 TIMES DAILY
Qty: 6 ML | Refills: 12 | Status: SHIPPED | OUTPATIENT
Start: 2024-10-24

## 2024-10-24 RX ORDER — OLOPATADINE HYDROCHLORIDE 2 MG/ML
1 SOLUTION/ DROPS OPHTHALMIC DAILY
Qty: 2.5 ML | Refills: 11 | Status: SHIPPED | OUTPATIENT
Start: 2024-10-24

## 2024-10-24 ASSESSMENT — KERATOMETRY
OD_K1POWER_DIOPTERS: 43.75
OD_K2POWER_DIOPTERS: 44.25
OD_AXISANGLE2_DEGREES: 174
OS_K2POWER_DIOPTERS: 44.00
OS_AXISANGLE2_DEGREES: 168
OD_AXISANGLE_DEGREES: 084
OS_K1POWER_DIOPTERS: 43.50
OS_AXISANGLE_DEGREES: 078

## 2024-10-24 ASSESSMENT — VISUAL ACUITY
OD_SC+: -1
CORRECTION_TYPE: GLASSES
METHOD: SNELLEN - LINEAR
OD_CC: 20/20
OD_CC: 20/20
OS_CC: 20/20
OS_SC: 20/20
OS_CC: 20/20
OD_SC: 20/20

## 2024-10-24 ASSESSMENT — CONF VISUAL FIELD
OD_INFERIOR_NASAL_RESTRICTION: 0
OS_NORMAL: 1
OD_INFERIOR_TEMPORAL_RESTRICTION: 0
OD_NORMAL: 1
OD_SUPERIOR_NASAL_RESTRICTION: 0
OD_SUPERIOR_TEMPORAL_RESTRICTION: 0
OS_SUPERIOR_TEMPORAL_RESTRICTION: 0
OS_SUPERIOR_NASAL_RESTRICTION: 0
OS_INFERIOR_NASAL_RESTRICTION: 0
OS_INFERIOR_TEMPORAL_RESTRICTION: 0
METHOD: COUNTING FINGERS

## 2024-10-24 ASSESSMENT — CUP TO DISC RATIO
OS_RATIO: 0.35
OD_RATIO: 0.35

## 2024-10-24 ASSESSMENT — TONOMETRY
IOP_METHOD: TONOPEN
OD_IOP_MMHG: 17.1
OS_IOP_MMHG: 14.9

## 2024-10-24 ASSESSMENT — REFRACTION_WEARINGRX
OS_CYLINDER: SPHERE
OD_SPHERE: +1.75
OD_CYLINDER: SPHERE
OS_SPHERE: +1.75

## 2024-10-24 ASSESSMENT — REFRACTION_MANIFEST
OD_SPHERE: +1.75
OS_SPHERE: +1.75
OS_CYLINDER: SPHERE
OD_CYLINDER: SPHERE

## 2024-10-24 ASSESSMENT — SLIT LAMP EXAM - LIDS
COMMENTS: MEIBOMIAN GLAND DYSFUNCTION
COMMENTS: MEIBOMIAN GLAND DYSFUNCTION

## 2024-10-24 ASSESSMENT — EXTERNAL EXAM - LEFT EYE: OS_EXAM: NORMAL

## 2024-10-24 ASSESSMENT — EXTERNAL EXAM - RIGHT EYE: OD_EXAM: NORMAL

## 2024-10-24 NOTE — PATIENT INSTRUCTIONS
Artificial tears- 1 drop both eyes once before bedtime and once in the morning then 2 x day as needed.        Pataday to be used once daily for itchy eyes.  Use as needed. Contact your pharmacy for refills.     Eyeglass prescription given.        Return in 1 year for a complete eye exam or sooner if needed.     Jarret Matson, OD    The affects of the dilating drops last for 4- 6 hours.  You will be more sensitive to light and vision will be blurry up close.  Do not drive if you do not feel comfortable.  Mydriatic sunglasses were given if needed.    There is a combination of three treatments which can greatly improve symptoms of dry eyes.     Artificial tears  Heat (eyes closed)  Eyelid and eyelash cleansing (eyes closed)     Use one drop of artificial tears both eyes 4 x daily.  Once in the morning, lunch, dinner and bedtime. Continue to use the drops regardless if your eyes are comfortable or not.  Artificial tears work best as a preventative and not as well after your eyes are starting to bother you.  It may take 4- 6 weeks of using the drops before you notice improvement.  If after that time you are still having problems schedule an appointment for an evaluation and discussion of different treatments such as Restasis or Xiidra.  Dry eyes are a chronic condition and you may have more symptoms at certain times of the year.    Excess tearing can be due to the right tears not working properly or a blockage in the tear drainage system.  You can try using artificial tears 1 drop both eyes 4 x day.  If the excess tearing is bothersome after 4-6 weeks of treatment then we can send you for further testing.  This would entail a referral to our oculoplastic specialist Dr. Man Clemens at the [facility]-[phone].    Recommended brands are:    Systane Complete  Systane Ultra  Systane Balance  Refresh Advanced Optive  Refresh Relieva  Blink    Recommended brands for contact lens wearers are:    Systane  contacts  Refresh contacts  Blink contacts    If you are using drops more than 4 x day or have sensitivities to preservatives I recommend non preserved artificial tears.  These come in 1 use vials.  They can be used every 1-2 hours.  Do not reuse the vials.    Recommended brands are:    Refresh Optive Jourdan-3  Systane- preservative free  Refresh-  preservative free  Blink- preservative free    Gels or ointment can be used at night.    Recommended brands are:    Systane Gel  Refresh Gel  Blink Gel  Genteal Gel    Systane night time (ointment)  Refresh Celluvisc  Refresh PM (ointment)    Optase dry eye spray.  Spray to eyelids 3-4 x daily.  This can be purchased on Amazon.      Visine, Clear Eyes or Murine (drops that get the red out) can irritate the eyes and cause a rebound effect where the eyes become more red and you end up using more drops.  Avoid drops containing tetrahydrozoline, naphazoline, phenylephrine, oxymetazoline.      OTC Lumify is a newer product that gives immediate redness relief without the rebound effect.  Use as needed to take the redness out.    Artificial tears may be used with other drops (such as allergy, glaucoma, antibiotics) around the same time.  Be sure to wait 5 minutes in between drops.    Heat to the eyelids can also improve your symptoms of dry eyes.  Heath heat masks can be purchased at Amazon to be used nightly for 10-15 minutes.  Other options are gel masks that can be put in the microwave and purchased at most pharmacies.      Tea Tree Oil eyelid cleansers recommended are Ocusoft Oust foam cleanser to cleanse eyelids/lashes at night and in the am. Other options are Blephadex or Cliradex eyelid wipes.  KEEP EYES CLOSED when using these products.  These can be purchased on amazon.com   A good product for make up remover with tea tree oil is WeLoveEyes.  This can be found at www.CreativeD or Top10.com.    Other good eyelid cleansers have hypochlorous which removes excess  bacteria and is safe around the eyes. Products are Avenova, Ocusoft Hypochlor or Heyedrate. Spray solution onto cotton pad, close eyes and gently apply to eyelids and eyelashes using side to side motion.  You can also KEEP EYES CLOSED spray and rub into eyelashes.  You do not need to rinse it off. Use morning and evening. These products can be found on Amazon.  You can check with your local pharmacy and see if they can order if for you if they don't have it.    Other brands of eyelid cleansing wipes are:    Ocusoft wipes  Systane wipes    A great eye make up line is [email or web address]        Optometry Providers       Clinic Locations                                 Telephone Number   Dr. Leny Murray    [facility]  [place] [phone]     [facility] Hours:                [place] [facility] Hours:       [facility] Hours:   [address]   [address]     [address] [place] [address]   [place] [address]    [place] [address]  Phone: [phone]                    Phone: [phone]     Phone: [phone]                      Monday 8:00-6:00                          Monday 8:00-6:00                          Monday 8:00-6:00              Tuesday 8:00-6:00                          Tuesday 8:00-6:00                          Tuesday 8:00-6:00              Wednesday 8:00-6:00                  Wednesday 8:00-6:00                   Wednesday 8:00-6:00      Thursday 8:00-6:00                        Thursday 8:00-6:00                         Thursday 8:00-6:00            Friday 8:00-5:00                              Friday 8:00-5:00                              Friday 8:00-5:00    [place] [facility] Hours:   [address] [id number] [phone]    Monday  9:00-6:00  Tuesday 9:00-6:00  Wednesday 9:00-6:00  Thursday 9:00-6:00  Friday 9:00-5:00  As always, Thank you for trusting us with your health care needs!

## 2024-10-24 NOTE — PROGRESS NOTES
Chief Complaint   Patient presents with    Annual Eye Exam         Last Eye Exam: 10/05/2023  Dilated Previously: Yes    What are you currently using to see?  glasses       Distance Vision Acuity: Satisfied with vision    Near Vision Acuity: Satisfied with vision while reading  with glasses    Eye Comfort: dry,itchy- needs refill on drops  Do you use eye drops? : Yes: Pataday,Systane     Medical, surgical and family histories reviewed and updated 10/24/2024.       OBJECTIVE: See Ophthalmology exam    ASSESSMENT:    ICD-10-CM    1. Examination of eyes and vision  Z01.00 EYE EXAM (SIMPLE-NONBILLABLE)      2. Hyperopia, bilateral  H52.03 REFRACTION      3. Dry eye syndrome of both eyes  H04.123 polyethylene glycol-propylene glycol (SYSTANE ULTRA) 0.4-0.3 % SOLN ophthalmic solution     EYE EXAM (SIMPLE-NONBILLABLE)      4. Allergic conjunctivitis of both eyes  H10.13 olopatadine (PATADAY) 0.2 % ophthalmic solution     EYE EXAM (SIMPLE-NONBILLABLE)          PLAN:     Patient Instructions   Artificial tears- 1 drop both eyes once before bedtime and once in the morning then 2 x day as needed.        Pataday to be used once daily for itchy eyes.  Use as needed. Contact your pharmacy for refills.     Eyeglass prescription given.        Return in 1 year for a complete eye exam or sooner if needed.     Jarret Matson, JACE

## 2024-10-24 NOTE — LETTER
10/24/2024      Cruz Obregon  [address]  [facility] [address]      Dear Colleague,    Thank you for referring your patient, Cruz Obregon, to the [facility]. Please see a copy of my visit note below.    Chief Complaint   Patient presents with     Annual Eye Exam         Last Eye Exam: 10/05/2023  Dilated Previously: Yes    What are you currently using to see?  glasses       Distance Vision Acuity: Satisfied with vision    Near Vision Acuity: Satisfied with vision while reading  with glasses    Eye Comfort: dry,itchy- needs refill on drops  Do you use eye drops? : Yes: Pataday,Systane     Medical, surgical and family histories reviewed and updated 10/24/2024.       OBJECTIVE: See Ophthalmology exam    ASSESSMENT:    ICD-10-CM    1. Examination of eyes and vision  Z01.00 EYE EXAM (SIMPLE-NONBILLABLE)      2. Hyperopia, bilateral  H52.03 REFRACTION      3. Dry eye syndrome of both eyes  H04.123 polyethylene glycol-propylene glycol (SYSTANE ULTRA) 0.4-0.3 % SOLN ophthalmic solution     EYE EXAM (SIMPLE-NONBILLABLE)      4. Allergic conjunctivitis of both eyes  H10.13 olopatadine (PATADAY) 0.2 % ophthalmic solution     EYE EXAM (SIMPLE-NONBILLABLE)          PLAN:     Patient Instructions   Artificial tears- 1 drop both eyes once before bedtime and once in the morning then 2 x day as needed.        Pataday to be used once daily for itchy eyes.  Use as needed. Contact your pharmacy for refills.     Eyeglass prescription given.        Return in 1 year for a complete eye exam or sooner if needed.     Jarret Matson OD       Again, thank you for allowing me to participate in the care of your patient.        Sincerely,        Jarret Matson, OD

## 2024-11-05 ENCOUNTER — PATIENT OUTREACH (OUTPATIENT)
Dept: CARE COORDINATION | Facility: CLINIC | Age: 39
End: 2024-11-05
Payer: COMMERCIAL

## 2024-11-05 NOTE — PROGRESS NOTES
CHW spoke with patient briefly but she was in the middle of something and requests a call back later in the week.     Next outreach due: 11/8/24

## 2024-11-15 ENCOUNTER — PATIENT OUTREACH (OUTPATIENT)
Dept: CARE COORDINATION | Facility: CLINIC | Age: 39
End: 2024-11-15
Payer: COMMERCIAL

## 2024-11-15 NOTE — PROGRESS NOTES
Clinic Care Coordination Contact  Care Coordination Clinician Chart Review    Situation: Patient chart reviewed by Care Coordinator.       Background: Care Coordination Program started: 7/15/2024. Initial assessment completed and patient-centered care plan(s) were developed with participation from patient. Lead CC handed patient off to CHW for continued outreaches.       Assessment: Per chart review, patient outreach completed by CC CHW today, 11/15/24. Per CHW patient did receive the information for the Fare for All locations but has not used them yet. Reminded her that she has a 6 month window to use this benefit before she needs to reapply. She will talk with her sister about bringing her to one of the locations. CHW request chart review to move patient to maintenance.        Care Plan: General Completed 11/15/2024      Problem: Resources  Resolved 11/15/2024      Goal: Gather resources  Completed 11/15/2024      Start Date: 7/18/2024 Expected End Date: 1/2/2025    This Visit's Progress: 100% Recent Progress: 100%    Priority: High    Note:     Barriers: Access   Strengths: Motivated   Patient expressed understanding of goal: Yes     Action steps to achieve this goal:  1. I will connect with community resources that are available in my area.   2. I will access [employer] program. -hasn't used but has information   3. I will work with care coordination as needed.                                   Plan/Recommendations: Pt has achieve goal above. SW CC moved pt to maintenance status. CHW will continue outreaches at minimum every 60 days and will involve Lead CC as needed or if patient is ready to graduate. Lead CC will continue to monitor CHW outreaches and patient's maintenance status after CHW 60 days outreach.     Plan of Care updated and sent to patient: ALEISHA Flores  Social Work Primary Care Clinic Care Coordinator  [facility]  [phone]   [email or web address]

## 2024-11-15 NOTE — PROGRESS NOTES
Clinic Care Coordination Contact  Community Health Worker Follow Up    Care Gaps:     Health Maintenance Due   Topic Date Due    INFLUENZA VACCINE (1) 09/01/2024    COVID-19 Vaccine (1 - 2024-25 season) Never done    GLUCOSE  09/29/2024       Patient has follow up scheduled for 12/3/24    Care Plan:   Care Plan: General       Problem: Resources       Goal: Gather resources       Start Date: 7/18/2024 Expected End Date: 1/2/2025    Recent Progress: 50%    Priority: High    Note:     Barriers: Access   Strengths: Motivated   Patient expressed understanding of goal: Yes     Action steps to achieve this goal:  1. I will connect with community resources that are available in my area.   2. I will access [employer] program. -hasn't used   3. I will work with care coordination as needed.                                Intervention and Education during outreach: Patient did receive the information for the Fare for All locations but has not used them yet. Reminded her that she has a 6 month window to use this benefit before she needs to reapply. She will talk with her sister about bringing her to one of the locations.     CHW Plan: CHW will request chart review to move patient to maintenance.     Next outreach due:  1/15/24

## 2024-12-05 ENCOUNTER — ALLIED HEALTH/NURSE VISIT (OUTPATIENT)
Dept: FAMILY MEDICINE | Facility: CLINIC | Age: 39
End: 2024-12-05
Payer: COMMERCIAL

## 2024-12-05 VITALS — WEIGHT: 146 LBS | BODY MASS INDEX: 22.87 KG/M2 | SYSTOLIC BLOOD PRESSURE: 110 MMHG | DIASTOLIC BLOOD PRESSURE: 68 MMHG

## 2024-12-05 DIAGNOSIS — Z30.42 DEPO-PROVERA CONTRACEPTIVE STATUS: Primary | ICD-10-CM

## 2024-12-05 RX ADMIN — MEDROXYPROGESTERONE ACETATE 150 MG: 150 INJECTION, SUSPENSION INTRAMUSCULAR at 14:49

## 2024-12-05 NOTE — PROGRESS NOTES
Clinic Administered Medication Documentation      Depo Provera Documentation    Depo-Provera Standing Order inclusion/exclusion criteria reviewed.     Is this the initial or subsequent dose of Depo Provera? Subsequent dose - patient is within the acceptable window of time (11-15 weeks) for subsequent injection. Pregnancy test not indicated.    Patient meets: inclusion criteria     Is there an active order (written within the past 365 days, with administrations remaining, not ) in the chart? Yes.     Prior to injection, verified patient identity using patient's name and date of birth. Medication was administered. Please see MAR and medication order for additional information.     Vial/Syringe: Single dose vial. Was entire vial of medication used? Yes    Patient instructed to remain in clinic for 15 minutes and report any adverse reaction to staff immediately.  NEXT INJECTION DUE: 25 - 3/20/25    Verified that the patient has refills remaining in their prescription.    /68 (BP Location: Left arm, Patient Position: Chair, Cuff Size: Adult Regular)   Wt 66.2 kg (146 lb)   BMI 22.87 kg/m      JONNATHAN Ortega MA

## 2024-12-24 ENCOUNTER — TELEPHONE (OUTPATIENT)
Dept: FAMILY MEDICINE | Facility: CLINIC | Age: 39
End: 2024-12-24
Payer: COMMERCIAL

## 2024-12-24 NOTE — TELEPHONE ENCOUNTER
Reason for Call:  Appointment Request    Patient requesting this type of appt: Chronic Diease Management/Medication/Follow-Up    Requested provider: Lisa [place]    Reason patient unable to be scheduled: Not within requested timeframe    When does patient want to be seen/preferred time: 3-7 days    Comments: NA    Could we send this information to you in [employer] or would you prefer to receive a phone call?:   No preference   Okay to leave a detailed message?: Yes at Cell number on file:    Telephone Information:   Mobile [phone]       Call taken on 12/24/2024 at 1:39 PM by Bala Li

## 2024-12-26 NOTE — TELEPHONE ENCOUNTER
"Would need to be scheduled in Jan.  Okay to take a \"one week\" slot for this patient.    Lisa Carney MD   "

## 2025-01-06 ENCOUNTER — OFFICE VISIT (OUTPATIENT)
Dept: FAMILY MEDICINE | Facility: CLINIC | Age: 40
End: 2025-01-06
Payer: COMMERCIAL

## 2025-01-06 VITALS
DIASTOLIC BLOOD PRESSURE: 72 MMHG | SYSTOLIC BLOOD PRESSURE: 117 MMHG | OXYGEN SATURATION: 99 % | TEMPERATURE: 98.7 F | HEART RATE: 71 BPM | BODY MASS INDEX: 23.38 KG/M2 | WEIGHT: 149.25 LBS

## 2025-01-06 DIAGNOSIS — Z11.3 SCREEN FOR STD (SEXUALLY TRANSMITTED DISEASE): ICD-10-CM

## 2025-01-06 DIAGNOSIS — B34.9 VIRAL ILLNESS: Primary | ICD-10-CM

## 2025-01-06 PROCEDURE — 86780 TREPONEMA PALLIDUM: CPT | Performed by: FAMILY MEDICINE

## 2025-01-06 PROCEDURE — 87591 N.GONORRHOEAE DNA AMP PROB: CPT | Performed by: FAMILY MEDICINE

## 2025-01-06 PROCEDURE — G2211 COMPLEX E/M VISIT ADD ON: HCPCS | Performed by: FAMILY MEDICINE

## 2025-01-06 PROCEDURE — 36415 COLL VENOUS BLD VENIPUNCTURE: CPT | Performed by: FAMILY MEDICINE

## 2025-01-06 PROCEDURE — 87491 CHLMYD TRACH DNA AMP PROBE: CPT | Performed by: FAMILY MEDICINE

## 2025-01-06 PROCEDURE — 87389 HIV-1 AG W/HIV-1&-2 AB AG IA: CPT | Performed by: FAMILY MEDICINE

## 2025-01-06 PROCEDURE — 99213 OFFICE O/P EST LOW 20 MIN: CPT | Performed by: FAMILY MEDICINE

## 2025-01-06 ASSESSMENT — PAIN SCALES - GENERAL: PAINLEVEL_OUTOF10: NO PAIN (0)

## 2025-01-06 ASSESSMENT — PATIENT HEALTH QUESTIONNAIRE - PHQ9: SUM OF ALL RESPONSES TO PHQ QUESTIONS 1-9: 9

## 2025-01-06 ASSESSMENT — ENCOUNTER SYMPTOMS: FLU SYMPTOMS: 1

## 2025-01-06 NOTE — PATIENT INSTRUCTIONS
At [facility], we strive to deliver an exceptional experience to you, every time we see you. If you receive a survey, please let us know what we are doing well and/or what we could improve upon, as we do value your feedback.  If you have MyChart, you can expect to receive results automatically within 24 hours of their completion.  Your provider will send a note interpreting your results as well.   If you do not have MyChart, you should receive your results in about a week by mail.    Your care team:                            Family Medicine Internal Medicine   MD Josh Liu, MD Lisa Weller, MD Marcus Murphy, MD Cathy Wesley, PABertramC    You Menard, MD Pediatrics   Jennifer Hernandez, MD Nickie Cota, MD Karen Yadav, APRN CNP Karen Wilkins APRN CNP   Anna Ocampo, MD Kim Morris, MD Sofie Ryder, CNP     Kentrell Kearns, CNP Same-Day Provider (No follow-up visits)   FIONA Aggarwal, DNP Stephanie Navarro, FIONA Moore, FNP, BC MG MukherjeeC     Clinic hours: Monday - Thursday 7 am-6 pm; Fridays 7 am-5 pm.   Urgent care: Monday - Friday 10 am- 8 pm; Saturday and Sunday 9 am-5 pm.    Clinic: [phone]       [facility]: Monday - Thursday 8 am - 7 pm; Friday 8 am - 6 pm  [facility]: [phone]     Safer Sex: Care Instructions  Overview  Safer sex is a way to reduce your risk of getting a sexually transmitted infection (STI). It can also help prevent pregnancy.  Several products can help you practice safer sex and reduce your chance of STIs. One of the best is a condom. There are internal and external condoms. You can use a special rubber sheet (dental dam) for protection during oral sex. Disposable gloves can keep your hands from touching blood, semen, or other body fluids that can carry infections.  Remember that birth control methods such as  diaphragms, IUDs, foams, and birth control pills do not stop you from getting STIs.  Follow-up care is a key part of your treatment and safety. Be sure to make and go to all appointments, and call your doctor if you are having problems. It's also a good idea to know your test results and keep a list of the medicines you take.  How can you care for yourself at home?  Think about getting vaccinated to help prevent hepatitis A, hepatitis B, and human papillomavirus (HPV). They can be spread through sex.  Use a condom every time you have sex. Use an external condom, which goes on the penis. Or use an internal condom, which goes into the vagina or anus.  Make sure you use the right size external condom. A condom that's too small can break easily. A condom that's too big can slip off during sex.  Use a new condom each time you have sex. Be careful not to poke a hole in the condom when you open the wrapper.  Don't use an internal condom and an external condom at the same time.  Never use petroleum jelly (such as Vaseline), grease, hand lotion, baby oil, or anything with oil in it. These products can make holes in the condom.  After intercourse, hold the edge of the condom as you remove it. This will help keep semen from spilling out of the condom.  Do not have sex with anyone who has symptoms of an STI, such as sores on the genitals or mouth.  Do not drink a lot of alcohol or use drugs before sex.  Limit your sex partners. Sex with one partner who has sex only with you can reduce your risk of getting an STI.  Don't share sex toys. But if you do share them, use a condom and clean the sex toys between each use.  Talk to any partners before you have sex. Talk about what you feel comfortable with and whether you have any boundaries with sex. And find out if your partner or partners may be at risk for any STI. Keep in mind that a person may be able to spread an STI even if they do not have symptoms. You and any partners may  "want to get tested for STIs.  Where can you learn more?  Go to https://www.[employer].net/patiented  Enter B608 in the search box to learn more about \"Safer Sex: Care Instructions.\"  Current as of: April 30, 2024  Content Version: 14.3    2024 [employer].   Care instructions adapted under license by your healthcare professional. If you have questions about a medical condition or this instruction, always ask your healthcare professional. [employer] disclaims any warranty or liability for your use of this information.    "

## 2025-01-06 NOTE — PROGRESS NOTES
Assessment & Plan     Viral illness  Resolving - continue to monitor.  Discussed that childhood smoke exposure may lead to these long illnesses for her.    Screen for STD (sexually transmitted disease)  Screening as in a new relationship  - Chlamydia trachomatis/Neisseria gonorrhoeae by PCR (first-voided urine)  - HIV Antigen Antibody Combo Cascade; Future  - Treponema Abs w Reflex to RPR and Titer; Future  - HIV Antigen Antibody Combo Cascade  - Treponema Abs w Reflex to RPR and Titer    Follow up this summer for FELIPE Ramesh is a 39 year old, presenting for the following health issues:  Flu Symptoms and STD check      1/6/2025     2:27 PM   Additional Questions   Roomed by Olga     Flu Symptoms    History of Present Illness       Reason for visit:  Flu symptoms and std screening  Symptom onset:  3-4 weeks ago  Symptoms include:  Cough, congestion, sore throat, chills,runny and stuffy nose  Symptom intensity:  Mild  Symptom progression:  Improving  Had these symptoms before:  Yes  Has tried/received treatment for these symptoms:  Yes  Previous treatment was successful:  Yes  Prior treatment description:  Variety of cold and flu medicine  What makes it worse:  No  What makes it better:  Sometimes She is missing 2 dose(s) of medications per week.  She is not taking prescribed medications regularly due to remembering to take.     3 weeks and starting to get better.  Sore throat, nasal congestion and productive cough.  Took OTC medications. Minimal congestion is left.  She feels better now.              Objective    /72 (BP Location: Left arm, Patient Position: Sitting, Cuff Size: Adult Regular)   Pulse 71   Temp 98.7  F (37.1  C) (Temporal)   Wt 67.7 kg (149 lb 4 oz)   SpO2 99%   BMI 23.38 kg/m    Body mass index is 23.38 kg/m .  Physical Exam   GENERAL: alert and no distress  EYES: Eyes grossly normal to inspection, PERRL and conjunctivae and sclerae normal  HENT: normal  cephalic/atraumatic, ear canals and TM's normal, nasal mucosa edematous , oropharynx clear, and oral mucous membranes moist  NECK: no adenopathy, no asymmetry, masses, or scars  RESP: lungs clear to auscultation - no rales, rhonchi or wheezes  CV: regular rate and rhythm, normal S1 S2, no S3 or S4, no murmur, click or rub, no peripheral edema  ABDOMEN: soft, nontender, no hepatosplenomegaly, no masses and bowel sounds normal  MS: no gross musculoskeletal defects noted, no edema  SKIN: no suspicious lesions or rashes  NEURO: Normal strength and tone, mentation intact and speech normal  PSYCH: mentation appears normal, affect normal/bright            Signed Electronically by: Lisa Carney MD

## 2025-01-07 LAB
C TRACH DNA SPEC QL PROBE+SIG AMP: NEGATIVE
HIV 1+2 AB+HIV1 P24 AG SERPL QL IA: NONREACTIVE
N GONORRHOEA DNA SPEC QL NAA+PROBE: NEGATIVE
T PALLIDUM AB SER QL: NONREACTIVE

## 2025-01-15 ENCOUNTER — TELEPHONE (OUTPATIENT)
Dept: CARDIOLOGY | Facility: CLINIC | Age: 40
End: 2025-01-15
Payer: COMMERCIAL

## 2025-01-15 DIAGNOSIS — R07.9 CHEST PAIN: ICD-10-CM

## 2025-01-15 DIAGNOSIS — Z13.6 CARDIOVASCULAR SCREENING; LDL GOAL LESS THAN 160: Primary | ICD-10-CM

## 2025-01-15 DIAGNOSIS — I07.1 TRICUSPID VALVE INSUFFICIENCY, UNSPECIFIED ETIOLOGY: ICD-10-CM

## 2025-01-15 DIAGNOSIS — I34.0 MITRAL VALVE INSUFFICIENCY, UNSPECIFIED ETIOLOGY: ICD-10-CM

## 2025-01-15 NOTE — TELEPHONE ENCOUNTER
----- Message from Cleopatra GRIMES sent at 1/15/2025  1:13 PM CST -----  Regarding: NEEDS ECHO AND LAB  Hi this pt needs an echo and a fasting lab prior to her visit next week.....unsure if we can make that happen    Could you attempt to make these happen and let me know?    Cleopatra

## 2025-01-15 NOTE — TELEPHONE ENCOUNTER
Spoke with patient about her concerns. Patient wondering why she was scheduled with Dr. Viera. We discussed that Dr. Alcazar was likely gone during the time she was scheduled. Patient states that she would prefer to wait for Dr. Alcazar, even though he does not have any openings at this time. We discussed the option of seeing FIONA Levi CNP in [place] in between just to get her into clinic. Patient is open to this if Dr. Viera or Dr. Alcazar recommends this. She also would like to know if the echo/labs are necessary at this time.

## 2025-01-15 NOTE — TELEPHONE ENCOUNTER
LVM & sent milogt message regarding fasting labs & echo prior to appointment on 1/23. Of note, Edgar has 2 morning echo spots on 1/21.

## 2025-01-15 NOTE — TELEPHONE ENCOUNTER
[facility] Call Center    Phone Message    May a detailed message be left on voicemail: yes    Reason for Call: Patient called requesting to speak with a member of her care team. Patient would like to know why she was scheduled with Dr. Viera, she is upset because she has never seen him before and would like to know why they placed her with him for her care. Would also like to know if the tests ordered are actually necessary. Please call back to further discuss.    Thank you!  [facility]

## 2025-01-28 ENCOUNTER — PATIENT OUTREACH (OUTPATIENT)
Dept: CARE COORDINATION | Facility: CLINIC | Age: 40
End: 2025-01-28
Payer: COMMERCIAL

## 2025-01-28 NOTE — PROGRESS NOTES
Clinic Care Coordination Contact    Patient is going well. She has not used the [employer] program but states that she hasn't needed it. She has no other care coordination needs at this time.     Patient has completed all goals with Clinic Care Coordination.  Please review the chart and confirm if graduation is approved.     NEVAEH Watkins [place], Brice Alexandre [place] and [facility]  [phone]

## 2025-01-29 ENCOUNTER — MYC REFILL (OUTPATIENT)
Dept: FAMILY MEDICINE | Facility: CLINIC | Age: 40
End: 2025-01-29
Payer: COMMERCIAL

## 2025-01-29 DIAGNOSIS — F51.01 PRIMARY INSOMNIA: ICD-10-CM

## 2025-01-29 RX ORDER — ZOLPIDEM TARTRATE 5 MG/1
5 TABLET ORAL
Qty: 30 TABLET | Refills: 5 | OUTPATIENT
Start: 2025-01-29

## 2025-01-29 RX ORDER — ZOLPIDEM TARTRATE 5 MG/1
TABLET ORAL
Qty: 30 TABLET | Refills: 5 | Status: SHIPPED | OUTPATIENT
Start: 2025-01-29

## 2025-04-01 NOTE — PROGRESS NOTES
Service Date: 2025    Lisa Montana MD  [id number] [place] [address]     RE:  Cruz Obregon   MRN:  [id number]   :  1985       Dear Dr. Sharon Carney:    It was a pleasure participating in the care of your patient, Cruz Obregon .  As you know, she is a 39 year old year old person who I met in person today for mitral valve prolapse and insomnia .    Past medical history is significant for the followin.  Sleep disorder in the past, possible narcolepsy.  Currently suffering from insomnia.  2.  HPV.  3.  Back pain.  4.  Positive CAMILO.  5.  Bacterial vaginosis.  6.  Gastroesophageal reflux disease.     I saw the patient 2022, and at that time we provided a sleep referral to help manage her insomnia and plan for a virtual video visit in 2 years with an echo and labs prior.    Since her last visit from a clinical standpoint, she has done well.  She does not exercise on a regular basis but she is feeling well performing her activities of daily living without gross limitation or symptoms.  Interestingly, she comments that she does not like to exercise because she has trouble maintaining her weight and that she has had to purposely try to gain weight on regular basis in order for her close to fit properly.    The patient otherwise denies gross chest pain, shortness of breath, PND, orthopnea, edema, palpitations, syncope or near syncope.    10 Point Review of Systems: Unremarkable    MEDICATIONS:    1.  Flonase  2.  Claritin  3.  Depo-Provera  4.  Imitrex  5.  Tizanidine  6.  Ventolin  7.  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  Mild mitral valve prolapse with mild secondary mitral insufficiency.     Her last echocardiogram 4/15/2025 reveals normal LV systolic function, ejection fraction 55 to 60%, bileaflet mitral valve prolapse with secondary mild to moderate mitral insufficiency, unchanged from prior echo    From a clinical standpoint, she is asymptomatic at a moderate level exertion, continue to follow clinically and noninvasively.          PLAN:    1.  Follow-up 1 year with echo and labs prior, earlier if needed.    Once again, it was a pleasure participating in the care of your patient, Cruz Obregon .  Please feel free to contact me at any time if there are any questions regarding her care in the future.      Sincerely,          Jose Alcazar M.D.  Cardiovascular Division  [facility]      Total time:  Including pre-visit chart review, in person face to face visit, post visit charting time as well as time for coordination of care:  42min

## 2025-04-09 ENCOUNTER — VIRTUAL VISIT (OUTPATIENT)
Dept: FAMILY MEDICINE | Facility: CLINIC | Age: 40
End: 2025-04-09
Payer: COMMERCIAL

## 2025-04-09 DIAGNOSIS — Z30.42 ENCOUNTER FOR SURVEILLANCE OF INJECTABLE CONTRACEPTIVE: Primary | ICD-10-CM

## 2025-04-09 DIAGNOSIS — F51.01 PRIMARY INSOMNIA: ICD-10-CM

## 2025-04-09 PROCEDURE — 98005 SYNCH AUDIO-VIDEO EST LOW 20: CPT | Performed by: FAMILY MEDICINE

## 2025-04-09 RX ORDER — MEDROXYPROGESTERONE ACETATE 150 MG/ML
150 INJECTION, SUSPENSION INTRAMUSCULAR
Status: ACTIVE | OUTPATIENT
Start: 2025-04-09 | End: 2026-04-04

## 2025-04-09 NOTE — PROGRESS NOTES
Domitila is a 39 year old who is being evaluated via a billable video visit.    How would you like to obtain your AVS? MyChart  If the video visit is dropped, the invitation should be resent by: Send to e-mail at: [email or web address]  Will anyone else be joining your video visit? No      Assessment & Plan     Encounter for surveillance of injectable contraceptive  Placed orders  - medroxyPROGESTERone (DEPO-PROVERA) injection 150 mg  - HCG qualitative urine; Future    Primary insomnia  Continue ambien    The uses and side effects, including black box warnings as appropriate, were discussed in detail.  All patient questions were answered.  The patient was instructed to call immediately if any side effects developed.     Subjective   Domitila is a 39 year old, presenting for the following health issues:  Follow Up (Follow up depo)        4/9/2025     8:30 AM   Additional Questions   Roomed by Clark MARIE MA       Video Start Time: 10:12 AM    HPI      Follow up Depo.     Missed last depo was due 2/20 - 3/20.  Uses depo for cycle management as had Essure after last pregnancy.    Doing well with zolpidem.  Using prn and helps some.          Review of Systems  Constitutional, HEENT, cardiovascular, pulmonary, gi and gu systems are negative, except as otherwise noted.      Objective           Vitals:  No vitals were obtained today due to virtual visit.    Physical Exam   GENERAL: alert and no distress  EYES: Eyes grossly normal to inspection.  No discharge or erythema, or obvious scleral/conjunctival abnormalities.  RESP: No audible wheeze, cough, or visible cyanosis.    SKIN: Visible skin clear. No significant rash, abnormal pigmentation or lesions.  NEURO: Cranial nerves grossly intact.  Mentation and speech appropriate for age.  PSYCH: Appropriate affect, tone, and pace of words        Video-Visit Details    Type of service:  Video Visit   Video End Time:10:17 AM  Originating Location (pt. Location): Home    Distant  Location (provider location):  On-site  Platform used for Video Visit: Santiago  Signed Electronically by: Lisa Carney MD

## 2025-04-14 ENCOUNTER — ANCILLARY PROCEDURE (OUTPATIENT)
Dept: CARDIOLOGY | Facility: CLINIC | Age: 40
End: 2025-04-14
Attending: INTERNAL MEDICINE
Payer: COMMERCIAL

## 2025-04-14 ENCOUNTER — ALLIED HEALTH/NURSE VISIT (OUTPATIENT)
Dept: FAMILY MEDICINE | Facility: CLINIC | Age: 40
End: 2025-04-14
Payer: COMMERCIAL

## 2025-04-14 ENCOUNTER — LAB (OUTPATIENT)
Dept: LAB | Facility: CLINIC | Age: 40
End: 2025-04-14
Payer: COMMERCIAL

## 2025-04-14 DIAGNOSIS — Z13.6 CARDIOVASCULAR SCREENING; LDL GOAL LESS THAN 160: ICD-10-CM

## 2025-04-14 DIAGNOSIS — R07.9 CHEST PAIN: ICD-10-CM

## 2025-04-14 DIAGNOSIS — I07.1 TRICUSPID VALVE INSUFFICIENCY, UNSPECIFIED ETIOLOGY: ICD-10-CM

## 2025-04-14 DIAGNOSIS — I34.0 MITRAL VALVE INSUFFICIENCY, UNSPECIFIED ETIOLOGY: ICD-10-CM

## 2025-04-14 DIAGNOSIS — Z30.42 ENCOUNTER FOR SURVEILLANCE OF INJECTABLE CONTRACEPTIVE: ICD-10-CM

## 2025-04-14 LAB
ANION GAP SERPL CALCULATED.3IONS-SCNC: 10 MMOL/L (ref 7–15)
BUN SERPL-MCNC: 5.6 MG/DL (ref 6–20)
CALCIUM SERPL-MCNC: 9.3 MG/DL (ref 8.8–10.4)
CHLORIDE SERPL-SCNC: 106 MMOL/L (ref 98–107)
CHOLEST SERPL-MCNC: 197 MG/DL
CREAT SERPL-MCNC: 0.78 MG/DL (ref 0.51–0.95)
EGFRCR SERPLBLD CKD-EPI 2021: >90 ML/MIN/1.73M2
FASTING STATUS PATIENT QL REPORTED: YES
FASTING STATUS PATIENT QL REPORTED: YES
GLUCOSE SERPL-MCNC: 100 MG/DL (ref 70–99)
HCG UR QL: NEGATIVE
HCO3 SERPL-SCNC: 26 MMOL/L (ref 22–29)
HDLC SERPL-MCNC: 59 MG/DL
LDLC SERPL CALC-MCNC: 125 MG/DL
LVEF ECHO: NORMAL
NONHDLC SERPL-MCNC: 138 MG/DL
POTASSIUM SERPL-SCNC: 3.4 MMOL/L (ref 3.4–5.3)
SODIUM SERPL-SCNC: 142 MMOL/L (ref 135–145)
TRIGL SERPL-MCNC: 65 MG/DL

## 2025-04-14 PROCEDURE — 80048 BASIC METABOLIC PNL TOTAL CA: CPT

## 2025-04-14 PROCEDURE — 99207 PR NO CHARGE NURSE ONLY: CPT

## 2025-04-14 PROCEDURE — 80061 LIPID PANEL: CPT

## 2025-04-14 PROCEDURE — 36415 COLL VENOUS BLD VENIPUNCTURE: CPT

## 2025-04-14 PROCEDURE — 93306 TTE W/DOPPLER COMPLETE: CPT | Performed by: INTERNAL MEDICINE

## 2025-04-14 PROCEDURE — 96372 THER/PROPH/DIAG INJ SC/IM: CPT | Performed by: FAMILY MEDICINE

## 2025-04-14 PROCEDURE — 81025 URINE PREGNANCY TEST: CPT

## 2025-04-14 RX ADMIN — MEDROXYPROGESTERONE ACETATE 150 MG: 150 INJECTION, SUSPENSION INTRAMUSCULAR at 10:43

## 2025-04-14 NOTE — PROGRESS NOTES
Clinic Administered Medication Documentation        Patient was given Depo Provera. Prior to medication administration, verified patient's identity using patient s name and date of birth. Please see MAR and medication order for additional information. Patient instructed to report any adverse reaction to staff immediately.    Vial/Syringe: Single dose vial. Was entire vial of medication used? Yes    NEXT INJECTION DUE: 6/30/25 - 7/28/25

## 2025-04-16 ENCOUNTER — OFFICE VISIT (OUTPATIENT)
Dept: CARDIOLOGY | Facility: CLINIC | Age: 40
End: 2025-04-16
Payer: COMMERCIAL

## 2025-04-16 VITALS
HEIGHT: 66 IN | DIASTOLIC BLOOD PRESSURE: 77 MMHG | BODY MASS INDEX: 23.95 KG/M2 | HEART RATE: 55 BPM | SYSTOLIC BLOOD PRESSURE: 117 MMHG | OXYGEN SATURATION: 98 % | WEIGHT: 149 LBS

## 2025-04-16 DIAGNOSIS — E78.5 HYPERLIPIDEMIA LDL GOAL <100: Primary | ICD-10-CM

## 2025-04-16 DIAGNOSIS — I34.0 MITRAL VALVE INSUFFICIENCY, UNSPECIFIED ETIOLOGY: ICD-10-CM

## 2025-04-16 DIAGNOSIS — I07.1 TRICUSPID VALVE INSUFFICIENCY, UNSPECIFIED ETIOLOGY: ICD-10-CM

## 2025-04-16 NOTE — NURSING NOTE
"Chief Complaint   Patient presents with    Follow Up       Initial /77 (BP Location: Right arm, Patient Position: Chair, Cuff Size: Adult Regular)   Pulse 55   Ht 1.676 m (5' 6\")   Wt 67.6 kg (149 lb)   LMP  (LMP Unknown)   SpO2 98%   BMI 24.05 kg/m   Estimated body mass index is 24.05 kg/m  as calculated from the following:    Height as of this encounter: 1.676 m (5' 6\").    Weight as of this encounter: 67.6 kg (149 lb)..  BP completed using cuff size: regular    Harmony JOVEL  "

## 2025-04-16 NOTE — PATIENT INSTRUCTIONS
Take your medicines every day, as directed     Changes made today:  No medication changes       Cardiology Care Coordinators:      Juhi PAUL RN     Cardiology Rooming Staff:  Harmony SHEIKH EMT    Phone  [phone]      Fax [phone]    To Contact us     During Business Hours:  [phone]     If you are needing refills please contact your pharmacy.     For urgent after hour care please call the [facility] at [phone] or the [facility] at [phone] and ask to speak to the cardiologist on call.    If you are having a medical emergency, please call 911.            HOW TO CHECK YOUR BLOOD PRESSURE AT HOME:     Avoid eating, smoking, and exercising for at least 30 minutes before taking a reading.     Be sure you have taken your BP medication at least 2-3 hours before you check it.      Sit quietly for 10 minutes before a reading.      Sit in a chair with your feet flat on the floor. Rest your  arm on a table so that the arm cuff is at the same level as your heart.     Remain still during the reading.  Record your blood pressure and pulse in a log and bring to your next appointment.       Use [employer] allows you to communicate directly with your heart team through secure messaging.  Part[employer]annel can be accessed any time on your phone, computer, or tablet.  If you need assistance, we'd be happy to help!             Keep your Heart Appointments:     Follow-up in 1 year with fasting labs

## 2025-05-05 ENCOUNTER — MYC REFILL (OUTPATIENT)
Dept: FAMILY MEDICINE | Facility: CLINIC | Age: 40
End: 2025-05-05
Payer: COMMERCIAL

## 2025-05-05 DIAGNOSIS — F51.01 PRIMARY INSOMNIA: ICD-10-CM

## 2025-05-05 RX ORDER — ZOLPIDEM TARTRATE 5 MG/1
TABLET ORAL
Qty: 30 TABLET | Refills: 5 | OUTPATIENT
Start: 2025-05-05

## 2025-07-01 ENCOUNTER — ALLIED HEALTH/NURSE VISIT (OUTPATIENT)
Dept: FAMILY MEDICINE | Facility: CLINIC | Age: 40
End: 2025-07-01
Payer: COMMERCIAL

## 2025-07-01 VITALS — SYSTOLIC BLOOD PRESSURE: 126 MMHG | BODY MASS INDEX: 24.02 KG/M2 | WEIGHT: 148.8 LBS | DIASTOLIC BLOOD PRESSURE: 79 MMHG

## 2025-07-01 DIAGNOSIS — Z30.42 ENCOUNTER FOR SURVEILLANCE OF INJECTABLE CONTRACEPTIVE: Primary | ICD-10-CM

## 2025-07-01 PROCEDURE — 96372 THER/PROPH/DIAG INJ SC/IM: CPT | Performed by: FAMILY MEDICINE

## 2025-07-01 PROCEDURE — 99207 PR NO CHARGE NURSE ONLY: CPT

## 2025-07-01 PROCEDURE — 3074F SYST BP LT 130 MM HG: CPT

## 2025-07-01 PROCEDURE — 3078F DIAST BP <80 MM HG: CPT

## 2025-07-01 RX ADMIN — MEDROXYPROGESTERONE ACETATE 150 MG: 150 INJECTION, SUSPENSION INTRAMUSCULAR at 11:15

## 2025-07-01 NOTE — PROGRESS NOTES
Clinic Administered Medication Documentation      Depo Provera Documentation    Depo-Provera Standing Order inclusion/exclusion criteria reviewed.     Is this the initial or subsequent dose of Depo Provera? Subsequent dose - patient is within the acceptable window of time (11-15 weeks) for subsequent injection. Pregnancy test not indicated.    Patient meets: inclusion criteria     Is there an active order (written within the past 365 days, with administrations remaining, not ) in the chart? Yes.     Prior to injection, verified patient identity using patient's name and date of birth. Medication was administered. Please see MAR and medication order for additional information.     Vial/Syringe: Single dose vial. Was entire vial of medication used? Yes    Patient instructed to remain in clinic for 15 minutes and report any adverse reaction to staff immediately.  NEXT INJECTION DUE: 25 - 10/14/25    Verified that the patient has refills remaining in their prescription.

## 2025-07-07 ENCOUNTER — HOSPITAL ENCOUNTER (OUTPATIENT)
Facility: CLINIC | Age: 40
Setting detail: OBSERVATION
Discharge: HOME | End: 2025-07-08
Attending: EMERGENCY MEDICINE | Admitting: PEDIATRICS
Payer: COMMERCIAL

## 2025-07-07 ENCOUNTER — APPOINTMENT (OUTPATIENT)
Dept: MRI IMAGING | Facility: CLINIC | Age: 40
End: 2025-07-07
Attending: EMERGENCY MEDICINE
Payer: COMMERCIAL

## 2025-07-07 VITALS
OXYGEN SATURATION: 99 % | SYSTOLIC BLOOD PRESSURE: 120 MMHG | DIASTOLIC BLOOD PRESSURE: 72 MMHG | RESPIRATION RATE: 16 BRPM | TEMPERATURE: 98.4 F | HEART RATE: 48 BPM

## 2025-07-07 DIAGNOSIS — Z30.42 ENCOUNTER FOR SURVEILLANCE OF INJECTABLE CONTRACEPTIVE: ICD-10-CM

## 2025-07-07 DIAGNOSIS — M54.16 LUMBAR RADICULOPATHY: ICD-10-CM

## 2025-07-07 DIAGNOSIS — M54.42 ACUTE LEFT-SIDED LOW BACK PAIN WITH LEFT-SIDED SCIATICA: Primary | ICD-10-CM

## 2025-07-07 LAB
ALBUMIN UR-MCNC: 10 MG/DL
ANION GAP SERPL CALCULATED.3IONS-SCNC: 13 MMOL/L (ref 7–15)
APPEARANCE UR: CLEAR
BASOPHILS # BLD AUTO: 0 10E3/UL (ref 0–0.2)
BASOPHILS NFR BLD AUTO: 0 %
BILIRUB UR QL STRIP: NEGATIVE
BUN SERPL-MCNC: 7.7 MG/DL (ref 6–20)
CALCIUM SERPL-MCNC: 9.2 MG/DL (ref 8.8–10.4)
CHLORIDE SERPL-SCNC: 105 MMOL/L (ref 98–107)
COLOR UR AUTO: YELLOW
CREAT SERPL-MCNC: 0.7 MG/DL (ref 0.51–0.95)
EGFRCR SERPLBLD CKD-EPI 2021: >90 ML/MIN/1.73M2
EOSINOPHIL # BLD AUTO: 0 10E3/UL (ref 0–0.7)
EOSINOPHIL NFR BLD AUTO: 1 %
ERYTHROCYTE [DISTWIDTH] IN BLOOD BY AUTOMATED COUNT: 12 % (ref 10–15)
GLUCOSE SERPL-MCNC: 104 MG/DL (ref 70–99)
GLUCOSE UR STRIP-MCNC: NEGATIVE MG/DL
HCG SERPL QL: NEGATIVE
HCO3 SERPL-SCNC: 22 MMOL/L (ref 22–29)
HCT VFR BLD AUTO: 42 % (ref 35–47)
HGB BLD-MCNC: 14.7 G/DL (ref 11.7–15.7)
HGB UR QL STRIP: NEGATIVE
IMM GRANULOCYTES # BLD: 0 10E3/UL
IMM GRANULOCYTES NFR BLD: 0 %
KETONES UR STRIP-MCNC: NEGATIVE MG/DL
LEUKOCYTE ESTERASE UR QL STRIP: NEGATIVE
LYMPHOCYTES # BLD AUTO: 1.8 10E3/UL (ref 0.8–5.3)
LYMPHOCYTES NFR BLD AUTO: 44 %
MCH RBC QN AUTO: 32.2 PG (ref 26.5–33)
MCHC RBC AUTO-ENTMCNC: 35 G/DL (ref 31.5–36.5)
MCV RBC AUTO: 92 FL (ref 78–100)
MONOCYTES # BLD AUTO: 0.2 10E3/UL (ref 0–1.3)
MONOCYTES NFR BLD AUTO: 4 %
MUCOUS THREADS #/AREA URNS LPF: PRESENT /LPF
NEUTROPHILS # BLD AUTO: 2.1 10E3/UL (ref 1.6–8.3)
NEUTROPHILS NFR BLD AUTO: 51 %
NITRATE UR QL: NEGATIVE
NRBC # BLD AUTO: 0 10E3/UL
NRBC BLD AUTO-RTO: 0 /100
PH UR STRIP: 6.5 [PH] (ref 5–7)
PLATELET # BLD AUTO: 250 10E3/UL (ref 150–450)
POTASSIUM SERPL-SCNC: 3.6 MMOL/L (ref 3.4–5.3)
RBC # BLD AUTO: 4.57 10E6/UL (ref 3.8–5.2)
RBC URINE: 2 /HPF
SODIUM SERPL-SCNC: 140 MMOL/L (ref 135–145)
SP GR UR STRIP: 1.03 (ref 1–1.03)
SQUAMOUS EPITHELIAL: 1 /HPF
UROBILINOGEN UR STRIP-MCNC: NORMAL MG/DL
WBC # BLD AUTO: 4.1 10E3/UL (ref 4–11)
WBC URINE: 2 /HPF

## 2025-07-07 PROCEDURE — 250N000013 HC RX MED GY IP 250 OP 250 PS 637: Performed by: EMERGENCY MEDICINE

## 2025-07-07 PROCEDURE — 80048 BASIC METABOLIC PNL TOTAL CA: CPT | Performed by: EMERGENCY MEDICINE

## 2025-07-07 PROCEDURE — G0378 HOSPITAL OBSERVATION PER HR: HCPCS

## 2025-07-07 PROCEDURE — 72148 MRI LUMBAR SPINE W/O DYE: CPT

## 2025-07-07 PROCEDURE — 250N000012 HC RX MED GY IP 250 OP 636 PS 637: Performed by: STUDENT IN AN ORGANIZED HEALTH CARE EDUCATION/TRAINING PROGRAM

## 2025-07-07 PROCEDURE — 96374 THER/PROPH/DIAG INJ IV PUSH: CPT

## 2025-07-07 PROCEDURE — 81001 URINALYSIS AUTO W/SCOPE: CPT | Performed by: EMERGENCY MEDICINE

## 2025-07-07 PROCEDURE — 250N000011 HC RX IP 250 OP 636: Performed by: STUDENT IN AN ORGANIZED HEALTH CARE EDUCATION/TRAINING PROGRAM

## 2025-07-07 PROCEDURE — 96375 TX/PRO/DX INJ NEW DRUG ADDON: CPT

## 2025-07-07 PROCEDURE — 85004 AUTOMATED DIFF WBC COUNT: CPT | Performed by: EMERGENCY MEDICINE

## 2025-07-07 PROCEDURE — 36415 COLL VENOUS BLD VENIPUNCTURE: CPT | Performed by: EMERGENCY MEDICINE

## 2025-07-07 PROCEDURE — 99285 EMERGENCY DEPT VISIT HI MDM: CPT | Performed by: EMERGENCY MEDICINE

## 2025-07-07 PROCEDURE — 250N000012 HC RX MED GY IP 250 OP 636 PS 637: Performed by: EMERGENCY MEDICINE

## 2025-07-07 PROCEDURE — 250N000011 HC RX IP 250 OP 636: Performed by: EMERGENCY MEDICINE

## 2025-07-07 PROCEDURE — 99223 1ST HOSP IP/OBS HIGH 75: CPT | Performed by: STUDENT IN AN ORGANIZED HEALTH CARE EDUCATION/TRAINING PROGRAM

## 2025-07-07 PROCEDURE — 96376 TX/PRO/DX INJ SAME DRUG ADON: CPT

## 2025-07-07 PROCEDURE — 99285 EMERGENCY DEPT VISIT HI MDM: CPT | Mod: 25

## 2025-07-07 PROCEDURE — 250N000013 HC RX MED GY IP 250 OP 250 PS 637: Performed by: STUDENT IN AN ORGANIZED HEALTH CARE EDUCATION/TRAINING PROGRAM

## 2025-07-07 PROCEDURE — 84703 CHORIONIC GONADOTROPIN ASSAY: CPT | Performed by: EMERGENCY MEDICINE

## 2025-07-07 RX ORDER — MEDROXYPROGESTERONE ACETATE 150 MG/ML
150 INJECTION, SUSPENSION INTRAMUSCULAR
Status: DISCONTINUED | OUTPATIENT
Start: 2025-07-07 | End: 2025-07-08 | Stop reason: HOSPADM

## 2025-07-07 RX ORDER — METHYLPREDNISOLONE 4 MG/1
4 TABLET ORAL AT BEDTIME
Status: DISCONTINUED | OUTPATIENT
Start: 2025-07-09 | End: 2025-07-08 | Stop reason: HOSPADM

## 2025-07-07 RX ORDER — METHYLPREDNISOLONE 4 MG/1
8 TABLET ORAL ONCE
Status: COMPLETED | OUTPATIENT
Start: 2025-07-07 | End: 2025-07-07

## 2025-07-07 RX ORDER — NALOXONE HYDROCHLORIDE 0.4 MG/ML
0.4 INJECTION, SOLUTION INTRAMUSCULAR; INTRAVENOUS; SUBCUTANEOUS
Status: DISCONTINUED | OUTPATIENT
Start: 2025-07-07 | End: 2025-07-08 | Stop reason: HOSPADM

## 2025-07-07 RX ORDER — METHYLPREDNISOLONE 4 MG/1
4 TABLET ORAL
Status: DISCONTINUED | OUTPATIENT
Start: 2025-07-08 | End: 2025-07-08 | Stop reason: HOSPADM

## 2025-07-07 RX ORDER — HYDROMORPHONE HYDROCHLORIDE 1 MG/ML
0.5 INJECTION, SOLUTION INTRAMUSCULAR; INTRAVENOUS; SUBCUTANEOUS ONCE
Status: COMPLETED | OUTPATIENT
Start: 2025-07-07 | End: 2025-07-07

## 2025-07-07 RX ORDER — AMOXICILLIN 250 MG
1 CAPSULE ORAL 2 TIMES DAILY PRN
Status: DISCONTINUED | OUTPATIENT
Start: 2025-07-07 | End: 2025-07-08 | Stop reason: HOSPADM

## 2025-07-07 RX ORDER — NALOXONE HYDROCHLORIDE 0.4 MG/ML
0.2 INJECTION, SOLUTION INTRAMUSCULAR; INTRAVENOUS; SUBCUTANEOUS
Status: DISCONTINUED | OUTPATIENT
Start: 2025-07-07 | End: 2025-07-08 | Stop reason: HOSPADM

## 2025-07-07 RX ORDER — ONDANSETRON 4 MG/1
4 TABLET, ORALLY DISINTEGRATING ORAL EVERY 6 HOURS PRN
Status: DISCONTINUED | OUTPATIENT
Start: 2025-07-07 | End: 2025-07-08 | Stop reason: HOSPADM

## 2025-07-07 RX ORDER — ACETAMINOPHEN 650 MG/1
650 SUPPOSITORY RECTAL EVERY 4 HOURS PRN
Status: DISCONTINUED | OUTPATIENT
Start: 2025-07-07 | End: 2025-07-08 | Stop reason: HOSPADM

## 2025-07-07 RX ORDER — METHYLPREDNISOLONE 4 MG/1
4 TABLET ORAL ONCE
Status: COMPLETED | OUTPATIENT
Start: 2025-07-07 | End: 2025-07-07

## 2025-07-07 RX ORDER — KETOROLAC TROMETHAMINE 15 MG/ML
15 INJECTION, SOLUTION INTRAMUSCULAR; INTRAVENOUS ONCE
Status: COMPLETED | OUTPATIENT
Start: 2025-07-07 | End: 2025-07-07

## 2025-07-07 RX ORDER — DEXAMETHASONE 2 MG/1
4 TABLET ORAL ONCE
Status: COMPLETED | OUTPATIENT
Start: 2025-07-07 | End: 2025-07-07

## 2025-07-07 RX ORDER — GABAPENTIN 100 MG/1
100 CAPSULE ORAL 3 TIMES DAILY
Status: DISCONTINUED | OUTPATIENT
Start: 2025-07-07 | End: 2025-07-08 | Stop reason: HOSPADM

## 2025-07-07 RX ORDER — ACETAMINOPHEN 325 MG/1
650 TABLET ORAL EVERY 4 HOURS PRN
Status: DISCONTINUED | OUTPATIENT
Start: 2025-07-07 | End: 2025-07-08 | Stop reason: HOSPADM

## 2025-07-07 RX ORDER — ONDANSETRON 2 MG/ML
4 INJECTION INTRAMUSCULAR; INTRAVENOUS EVERY 6 HOURS PRN
Status: DISCONTINUED | OUTPATIENT
Start: 2025-07-07 | End: 2025-07-08 | Stop reason: HOSPADM

## 2025-07-07 RX ORDER — AMOXICILLIN 250 MG
2 CAPSULE ORAL 2 TIMES DAILY PRN
Status: DISCONTINUED | OUTPATIENT
Start: 2025-07-07 | End: 2025-07-08 | Stop reason: HOSPADM

## 2025-07-07 RX ORDER — METHYLPREDNISOLONE 4 MG/1
8 TABLET ORAL AT BEDTIME
Status: DISCONTINUED | OUTPATIENT
Start: 2025-07-07 | End: 2025-07-08 | Stop reason: HOSPADM

## 2025-07-07 RX ORDER — HYDROMORPHONE HYDROCHLORIDE 1 MG/ML
0.5 INJECTION, SOLUTION INTRAMUSCULAR; INTRAVENOUS; SUBCUTANEOUS EVERY 30 MIN PRN
Refills: 0 | Status: DISCONTINUED | OUTPATIENT
Start: 2025-07-07 | End: 2025-07-08 | Stop reason: HOSPADM

## 2025-07-07 RX ORDER — MEDROXYPROGESTERONE ACETATE 150 MG/ML
150 INJECTION, SUSPENSION INTRAMUSCULAR
Status: DISCONTINUED | OUTPATIENT
Start: 2025-10-06 | End: 2025-07-08 | Stop reason: HOSPADM

## 2025-07-07 RX ORDER — DIAZEPAM 2 MG/1
2 TABLET ORAL ONCE
Status: COMPLETED | OUTPATIENT
Start: 2025-07-07 | End: 2025-07-07

## 2025-07-07 RX ORDER — ONDANSETRON 2 MG/ML
4 INJECTION INTRAMUSCULAR; INTRAVENOUS ONCE
Status: COMPLETED | OUTPATIENT
Start: 2025-07-07 | End: 2025-07-07

## 2025-07-07 RX ORDER — PROCHLORPERAZINE MALEATE 5 MG/1
10 TABLET ORAL EVERY 6 HOURS PRN
Status: DISCONTINUED | OUTPATIENT
Start: 2025-07-07 | End: 2025-07-08 | Stop reason: HOSPADM

## 2025-07-07 RX ADMIN — ONDANSETRON 4 MG: 2 INJECTION INTRAMUSCULAR; INTRAVENOUS at 15:40

## 2025-07-07 RX ADMIN — ONDANSETRON 4 MG: 2 INJECTION INTRAMUSCULAR; INTRAVENOUS at 20:42

## 2025-07-07 RX ADMIN — DIAZEPAM 2 MG: 2 TABLET ORAL at 12:52

## 2025-07-07 RX ADMIN — DEXAMETHASONE 4 MG: 2 TABLET ORAL at 12:52

## 2025-07-07 RX ADMIN — METHYLPREDNISOLONE 8 MG: 4 TABLET ORAL at 22:49

## 2025-07-07 RX ADMIN — HYDROMORPHONE HYDROCHLORIDE 0.5 MG: 1 INJECTION, SOLUTION INTRAMUSCULAR; INTRAVENOUS; SUBCUTANEOUS at 14:15

## 2025-07-07 RX ADMIN — HYDROMORPHONE HYDROCHLORIDE 0.5 MG: 1 INJECTION, SOLUTION INTRAMUSCULAR; INTRAVENOUS; SUBCUTANEOUS at 19:25

## 2025-07-07 RX ADMIN — KETOROLAC TROMETHAMINE 15 MG: 15 INJECTION, SOLUTION INTRAMUSCULAR; INTRAVENOUS at 11:31

## 2025-07-07 RX ADMIN — GABAPENTIN 100 MG: 100 CAPSULE ORAL at 22:49

## 2025-07-07 ASSESSMENT — ACTIVITIES OF DAILY LIVING (ADL)
ADLS_ACUITY_SCORE: 41

## 2025-07-07 ASSESSMENT — COLUMBIA-SUICIDE SEVERITY RATING SCALE - C-SSRS
1. IN THE PAST MONTH, HAVE YOU WISHED YOU WERE DEAD OR WISHED YOU COULD GO TO SLEEP AND NOT WAKE UP?: NO
2. HAVE YOU ACTUALLY HAD ANY THOUGHTS OF KILLING YOURSELF IN THE PAST MONTH?: NO
6. HAVE YOU EVER DONE ANYTHING, STARTED TO DO ANYTHING, OR PREPARED TO DO ANYTHING TO END YOUR LIFE?: NO

## 2025-07-07 NOTE — ED PROVIDER NOTES
ED Provider Note  [facility]      History     Chief Complaint   Patient presents with    Back Pain     HPI  Crzu Obregon is a 39 year old female who presents with low back pain and left leg numbness that started on waking up this morning.  She also has numbness in her right foot.  The pain is worse with movement.  She has not lifted anything heavy, or done anything out of the usual.  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  She does have a history of back pain for which she sees a chiropractor.  However she has not gone to her chiropractor in a couple of weeks.  Previous back pain has never been like this.  Last period was 10 years ago.  The patient is on a Depo shot.  Patient reports no urinary or stool urge. Patient denies recent illness, recent camping, recent weightbearing activity, shortness of breath or chest pain.    A medically appropriate review of systems was performed with pertinent positives and negatives noted in the HPI, and all other systems negative.    Physical Exam   BP: 109/56  Pulse: 60  Temp: 98.2  F (36.8  C)  Resp: 16  SpO2: 98 %  Physical Exam  General: Patient laying on her right side in bed.  Cardiovascular: RRR with 2/6 murmur  Respiratory: Lungs clear to auscultation bilaterally  MSK: Tenderness to palpation along the lumbar spine and bilateral hips.  No tenderness to palpation along the cervical and thoracic spine.  Neurological: Adequate strength in lower extremities.  Decrease sensation in the left lower extremity compared to right.    ED Course, Procedures, & Data     ED Course as of 07/07/25 1330   Mon Jul 07, 2025   1200 CBC with platelets differential   1200 UA with Microscopic reflex to Culture(!)   1207 HCG qualitative Blood   1216 Basic metabolic panel(!)     Procedures          Results for orders placed or performed during the hospital encounter of 07/07/25   UA with Microscopic reflex to Culture    Specimen: Urine, Clean Catch    Result Value Ref Range    Color Urine Yellow Colorless, Straw, Light Yellow, Yellow    Appearance Urine Clear Clear    Glucose Urine Negative Negative mg/dL    Bilirubin Urine Negative Negative    Ketones Urine Negative Negative mg/dL    Specific Gravity Urine 1.032 1.003 - 1.035    Blood Urine Negative Negative    pH Urine 6.5 5.0 - 7.0    Protein Albumin Urine 10 (A) Negative mg/dL    Urobilinogen Urine Normal Normal mg/dL    Nitrite Urine Negative Negative    Leukocyte Esterase Urine Negative Negative    Mucus Urine Present (A) None Seen /LPF    RBC Urine 2 <=2 /HPF    WBC Urine 2 <=5 /HPF    Squamous Epithelials Urine 1 <=1 /HPF   HCG qualitative Blood   Result Value Ref Range    hCG Serum Qualitative Negative Negative   Basic metabolic panel   Result Value Ref Range    Sodium 140 135 - 145 mmol/L    Potassium 3.6 3.4 - 5.3 mmol/L    Chloride 105 98 - 107 mmol/L    Carbon Dioxide (CO2) 22 22 - 29 mmol/L    Anion Gap 13 7 - 15 mmol/L    Urea Nitrogen 7.7 6.0 - 20.0 mg/dL    Creatinine 0.70 0.51 - 0.95 mg/dL    GFR Estimate >90 >60 mL/min/1.73m2    Calcium 9.2 8.8 - 10.4 mg/dL    Glucose 104 (H) 70 - 99 mg/dL   CBC with platelets and differential   Result Value Ref Range    WBC Count 4.1 4.0 - 11.0 10e3/uL    RBC Count 4.57 3.80 - 5.20 10e6/uL    Hemoglobin 14.7 11.7 - 15.7 g/dL    Hematocrit 42.0 35.0 - 47.0 %    MCV 92 78 - 100 fL    MCH 32.2 26.5 - 33.0 pg    MCHC 35.0 31.5 - 36.5 g/dL    RDW 12.0 10.0 - 15.0 %    Platelet Count 250 150 - 450 10e3/uL    % Neutrophils 51 %    % Lymphocytes 44 %    % Monocytes 4 %    % Eosinophils 1 %    % Basophils 0 %    % Immature Granulocytes 0 %    NRBCs per 100 WBC 0 <1 /100    Absolute Neutrophils 2.1 1.6 - 8.3 10e3/uL    Absolute Lymphocytes 1.8 0.8 - 5.3 10e3/uL    Absolute Monocytes 0.2 0.0 - 1.3 10e3/uL    Absolute Eosinophils 0.0 0.0 - 0.7 10e3/uL    Absolute Basophils 0.0 0.0 - 0.2 10e3/uL    Absolute Immature Granulocytes 0.0 <=0.4 10e3/uL    Absolute NRBCs 0.0  10e3/uL     Medications   ketorolac (TORADOL) injection 15 mg (15 mg Intravenous $Given 7/7/25 1131)   dexAMETHasone (DECADRON) tablet 4 mg (4 mg Oral $Given 7/7/25 1252)   diazepam (VALIUM) tablet 2 mg (2 mg Oral $Given 7/7/25 1252)          Critical care was not performed.     Medical Decision Making  The patient's presentation was of high complexity (a chronic illness severe exacerbation, progression, or side effect of treatment).    The patient's evaluation involved:  ordering and/or review of 3+ test(s) in this encounter (see separate area of note for details)    The patient's management necessitated moderate risk (prescription drug management including medications given in the ED), high risk (a parenteral controlled substance), and further care after sign-out to Dr. Morris (see their note for further management).    Assessment & Plan    Cruz Obregon is a 39 year old female who presents with low back pain, left leg and right foot numbness that started on waking up this morning.  She has not had no similar symptoms in the past.    Lab work is unremarkable, and bladder scan showed sufficient emptying.  Symptomatic management was attempted with no improvement or resolution of pain or symptoms.  Plan is to get an MRI.    I have reviewed the nursing notes. I have reviewed the findings, diagnosis, plan and need for follow up with the patient.    --    ED Attending Physician Attestation    I Radha Curran MD, cared for this patient with the Medical Student. I performed, or re-performed, the physical exam and medical decision-making. I have verified the accuracy of all the medical student findings and documentation above, and have edited as necessary.    Summary of HPI, PE, ED Course   Patient is a 39 year old female evaluated in the emergency department for lumbar back pain radiating into the left lower extremity with associated numbness.  On exam she does have good strength bilaterally the reports  subjective numbness in the left lower extremity.  Additionally she reports the sole of the foot on the right is somewhat tingling.  She was able to void and on postvoid residual does not have any urinary retention.  Lower suspicion for cauda equina.  UA is negative for evidence of UTI.  Her abdomen is benign without any tenderness to palpation and lower suspicion for intra-abdominal etiology.  Electrolytes are within normal limits.  No leukocytosis.  She was given Toradol, diazepam, dexamethasone and Dilaudid but continues to have significant discomfort.  MRI of the lumbar spine was ordered and is pending at time of signout.    Radha Curran MD  Emergency Medicine       New Prescriptions    No medications on file         Leslie Gomez, MS4  Medical Student  [facility] EMERGENCY DEPARTMENT  7/7/2025     Radha Curran MD  07/07/25 [id number]

## 2025-07-07 NOTE — ED TRIAGE NOTES
Patient reports going to bed with low back pain and waking up with severe pain that radiates down legs. Numbness in left leg and right foot. She tried muscle relaxers and heat which did not help.      Triage Assessment (Adult)       Row Name 07/07/25 0937          Triage Assessment    Airway WDL WDL        Respiratory WDL    Respiratory WDL WDL        Skin Circulation/Temperature WDL    Skin Circulation/Temperature WDL WDL        Cardiac WDL    Cardiac WDL WDL        Peripheral/Neurovascular WDL    Peripheral Neurovascular WDL X;neurovascular assessment lower        Cognitive/Neuro/Behavioral WDL    Cognitive/Neuro/Behavioral WDL WDL        LLE Neurovascular Assessment    Temperature LLE warm     Color LLE no discoloration     Sensation LLE tingling present;tenderness present;numbness present        RLE Neurovascular Assessment    Temperature RLE warm     Color RLE no discoloration     Sensation RLE tenderness present;numbness present;tingling present

## 2025-07-07 NOTE — ED NOTES
07/07/25 1131   Urine Assessment   Urine Characteristics Tea colored / Dark susi  (Post void)   Bladder Scan Volume (mL) 25 ml   Urine: Other Bladder Scan     Scan total prior to voiding was 250cc

## 2025-07-07 NOTE — CONSULTS
[facility]       NEUROSURGERY CONSULTATION NOTE    This consultation was requested by Dr. Sen from the Emergency Medicine service.    Reason for Consultation: low back pain and Left paracentral disc protrusion at L5-S1     HPI: Cruz Obregon is a 39 year old female who was seen for low back pain and left leg numbness, with MRI demonstrating a left paracentral disc protrusion at L5-S1. Her pain began last night, and was worse when she woke up this morning. She states that the leg numbness began a few hours ago. The pain is sharp and radiates into her left posterior thigh and leg. She denies any recent trauma, bowel or bladder problems, weakness or other neurologic symptoms.       PAST MEDICAL HISTORY:   Past Medical History:   Diagnosis Date    Abnormal Pap smear of cervix 11/17/2010 2010, 2012    Cervical high risk HPV (human papillomavirus) test positive     2012, 2021    Chronic daily headache 10/27/2011    H/O colposcopy with cervical biopsy 01/30/2012 2012, 2013    Heart disease     Heart Murmur    Heart murmur     Mitral regurg    Mild Mitral insufficiency 01/04/2011    Mild Tricuspid insufficiency 01/04/2011    Narcolepsy     Diagnosed by sleep specialist based In [place]    Oligohydramnios - delivered 2009    induced @ 37 weeks    Unspecified hemorrhoids without mention of complication        PAST SURGICAL HISTORY:   Past Surgical History:   Procedure Laterality Date    NO HISTORY OF SURGERY         FAMILY HISTORY:   Family History   Problem Relation Age of Onset    Asthma Mother     Alcohol/Drug Mother     Allergies Mother     Cerebrovascular Disease Father     Hypertension Father     Gastrointestinal Disease Father     Alcohol/Drug Father     Lipids Father     Hearing Loss Father     Neurologic Disorder Father         migraine    Diabetes Maternal Grandmother     Cerebrovascular Disease Maternal Grandmother     Alzheimer Disease Maternal  Grandfather     Neurologic Disorder Paternal Grandmother         migraine    Cancer Paternal Grandfather     Asthma Sister     Cardiovascular Other     Thyroid Disease No family hx of     Glaucoma No family hx of     Macular Degeneration No family hx of        SOCIAL HISTORY:   Social History     Tobacco Use    Smoking status: Never    Smokeless tobacco: Current   Substance Use Topics    Alcohol use: No     Alcohol/week: 0.0 standard drinks of alcohol       MEDICATIONS:  (Not in a hospital admission)      Current Outpatient Medications   Medication Instructions    fluticasone (FLONASE) 50 MCG/ACT nasal spray 2 sprays, Both Nostrils, DAILY    Loratadine (CLARITIN PO) No dose, route, or frequency recorded.    medroxyPROGESTERone (DEPO-PROVERA) 150 mg, Intramuscular, EVERY 3 MONTHS    olopatadine (PATADAY) 0.2 % ophthalmic solution 1 drop, Both Eyes, DAILY    polyethylene glycol-propylene glycol (SYSTANE ULTRA) 0.4-0.3 % SOLN ophthalmic solution 1 drop, Both Eyes, 4 TIMES DAILY    SUMAtriptan (IMITREX) 25 mg, Oral, AT ONSET OF HEADACHE, 1-2 tablets (25-50 mg) by mouth at onset of headache for migraine. May repeat in 2 hours. Max 8 tablets/24hours    tiZANidine (ZANAFLEX) 2 mg, Oral, 3 TIMES DAILY PRN    VENTOLIN  (90 Base) MCG/ACT inhaler INHALE 2 PUFFS BY MOUTH EVERY 4 HOURS AS NEEDED FOR SHORTNESS OF BREATH OR WHEEZING OR COUGH    zolpidem (AMBIEN) 5 MG tablet TAKE 1 TABLET(5 MG) BY MOUTH EVERY EVENING AS NEEDED FOR SLEEP         Allergies:  Allergies   Allergen Reactions    Sulfa Antibiotics Rash    Sulfamethoxazole Rash       Physical exam:   Blood pressure 109/56, pulse 60, temperature 98.2  F (36.8  C), temperature source Oral, resp. rate 16, SpO2 98%, not currently breastfeeding.  CV: No peripheral edema, extremities well-perfused  PULM: breathing comfortably on room air  ABD: soft, non-distended  NEUROLOGIC:  -- Awake; Alert; oriented x 3  -- Follows commands briskly  -- no gaze preference. No apparent  hemineglect.  Cranial Nerves:  -- PERRL, extraocular movements intact  -- face symmetrical  -- hearing grossly intact bilat  -- Trapezii 5/5 strength bilat symmetric    Motor:  Normal bulk / tone; no tremor, rigidity, or bradykinesia.  No muscle wasting or fasciculations     Delt Bi Tri Hand Flexion/  Extension Iliopsoas Quadriceps Hamstrings Tibialis Anterior Gastroc    C5 C6 C7 C8/T1 L2 L3 L4-S1 L4 S1   R 5 5 5 5 5 5 5 5 5   L 5 5 5 5 5 5 5 5 5   Sensory:  intact to LT x 4 extremities     Reflexes:     Bi Tri BR Imer Pat Ach    C5-6 C7-8 C6 UMN L2-4 S1   R 2+ 2+ 2+ Norm 2+ 2+   L 2+ 2+ 2+ Norm 2+ 2+       LABS:  Recent Labs   Lab 07/07/25  1129      POTASSIUM 3.6   CHLORIDE 105   CO2 22   ANIONGAP 13   *   BUN 7.7   CR 0.70   HAMILTON 9.2       Recent Labs   Lab 07/07/25  1129   WBC 4.1   RBC 4.57   HGB 14.7   HCT 42.0   MCV 92   MCH 32.2   MCHC 35.0   RDW 12.0          IMAGING:  Recent Results (from the past 24 hours)   Lumbar spine MRI w/o contrast    Narrative    EXAM: MR LUMBAR SPINE W/O CONTRAST  LOCATION: [facility]  DATE: 7/7/2025    INDICATION: increased lumbar back pain, new left lower extremity numbness and right foot numbness  COMPARISON: None.  TECHNIQUE: Routine Lumbar Spine MRI without IV contrast.    FINDINGS:   Nomenclature is based on 5 lumbar type vertebral bodies. Alignment of the lumbar spine is normal. Lumbar vertebral body heights are maintained. Mild Modic type I endplate changes at L5-S1. Normal distal spinal cord and cauda equina with conus medullaris   at L2. No significant extraspinal abnormality. Unremarkable visualized bony pelvis.    T12-L1: Normal disc height and signal. Normal facets. No significant spinal canal or neural foraminal stenosis.     L1-L2: Normal disc height and signal. Normal facets. No significant spinal canal or neural foraminal stenosis.    L2-L3: Normal disc height and signal. Normal facets. No  significant spinal canal or neural foraminal stenosis.     L3-L4: Normal disc height and signal. Mild disc bulge. Mild facet hypertrophy. No significant spinal canal or neural foraminal stenosis.    L4-L5: Normal disc height and signal. Mild disc bulge. Mild facet hypertrophy. No significant spinal canal stenosis. No significant neural foraminal stenosis.    L5-S1: Mild loss of disc height and signal. Mild disc bulge with superimposed central/left paracentral disc protrusion and associated annular fissure. This contacts the traversing left S1 nerve root. Mild facet hypertrophy. No significant spinal canal   stenosis. No significant neural foraminal stenosis.      Impression    IMPRESSION:  1.  Left paracentral disc protrusion at L5-S1, contacting the traversing left S1 nerve root.  2.  Additional mild multilevel lumbar spondylosis, not contributing to significant spinal canal or neural foraminal stenosis, as above.       ASSESSMENT:  Cruz Obregon is a 39 year old female presenting with low back pain radiating into the left lower extremity and left leg numbness with an L5-S1 left paracentral disc protrusion on MR imaging.    RECOMMENDATIONS:  No acute neurosurgical intervention indicated at this time   Recommend Medrol dose pack and NSAIDs for pain management   PT/OT   Follow up in 4-6 weeks in clinic     Neurosurgery will follow peripherally at this time. Please don't hesitate to reach out if any further concerns arise.     The patient was discussed with Dr. Gallego, neurosurgery chief resident, and Dr. Cook, neurosurgery staff, and they agree with the above.    Xena Villalobos MD  Neurosurgery PGY-1  Personal pager #[phone]  Please page #[phone] (urgent)/VOCERA (non-urgent) on-call neurosurgery resident with questions

## 2025-07-08 ENCOUNTER — PATIENT OUTREACH (OUTPATIENT)
Dept: FAMILY MEDICINE | Facility: CLINIC | Age: 40
End: 2025-07-08
Payer: COMMERCIAL

## 2025-07-08 PROCEDURE — G0378 HOSPITAL OBSERVATION PER HR: HCPCS

## 2025-07-08 PROCEDURE — 250N000013 HC RX MED GY IP 250 OP 250 PS 637: Performed by: PEDIATRICS

## 2025-07-08 PROCEDURE — 250N000013 HC RX MED GY IP 250 OP 250 PS 637: Performed by: STUDENT IN AN ORGANIZED HEALTH CARE EDUCATION/TRAINING PROGRAM

## 2025-07-08 PROCEDURE — 250N000012 HC RX MED GY IP 250 OP 636 PS 637: Performed by: STUDENT IN AN ORGANIZED HEALTH CARE EDUCATION/TRAINING PROGRAM

## 2025-07-08 PROCEDURE — 99239 HOSP IP/OBS DSCHRG MGMT >30: CPT | Performed by: STUDENT IN AN ORGANIZED HEALTH CARE EDUCATION/TRAINING PROGRAM

## 2025-07-08 RX ORDER — LIDOCAINE 50 MG/G
1 PATCH TOPICAL EVERY 24 HOURS
Qty: 30 PATCH | Refills: 0 | Status: SHIPPED | OUTPATIENT
Start: 2025-07-08 | End: 2025-07-16

## 2025-07-08 RX ORDER — IBUPROFEN 600 MG/1
600 TABLET, FILM COATED ORAL EVERY 6 HOURS PRN
Qty: 90 TABLET | Refills: 0 | Status: SHIPPED | OUTPATIENT
Start: 2025-07-08 | End: 2025-07-16

## 2025-07-08 RX ORDER — OXYCODONE HYDROCHLORIDE 10 MG/1
10 TABLET ORAL EVERY 4 HOURS PRN
Refills: 0 | Status: DISCONTINUED | OUTPATIENT
Start: 2025-07-08 | End: 2025-07-08 | Stop reason: HOSPADM

## 2025-07-08 RX ORDER — METHOCARBAMOL 500 MG/1
500 TABLET, FILM COATED ORAL 4 TIMES DAILY
Status: DISCONTINUED | OUTPATIENT
Start: 2025-07-08 | End: 2025-07-08 | Stop reason: HOSPADM

## 2025-07-08 RX ORDER — ACETAMINOPHEN 500 MG
1000 TABLET ORAL EVERY 6 HOURS PRN
Qty: 90 TABLET | Refills: 0 | Status: SHIPPED | OUTPATIENT
Start: 2025-07-08 | End: 2025-07-16

## 2025-07-08 RX ORDER — ONDANSETRON 4 MG/1
4 TABLET, ORALLY DISINTEGRATING ORAL EVERY 6 HOURS PRN
Qty: 20 TABLET | Refills: 0 | Status: SHIPPED | OUTPATIENT
Start: 2025-07-08

## 2025-07-08 RX ORDER — OXYCODONE HYDROCHLORIDE 5 MG/1
5 TABLET ORAL EVERY 4 HOURS PRN
Refills: 0 | Status: DISCONTINUED | OUTPATIENT
Start: 2025-07-08 | End: 2025-07-08 | Stop reason: HOSPADM

## 2025-07-08 RX ORDER — METHYLPREDNISOLONE 4 MG/1
TABLET ORAL
Qty: 21 TABLET | Refills: 0 | Status: SHIPPED | OUTPATIENT
Start: 2025-07-08 | End: 2025-07-16

## 2025-07-08 RX ORDER — ACETAMINOPHEN 500 MG
1000 TABLET ORAL EVERY 6 HOURS
Status: DISCONTINUED | OUTPATIENT
Start: 2025-07-08 | End: 2025-07-08 | Stop reason: HOSPADM

## 2025-07-08 RX ORDER — GABAPENTIN 100 MG/1
100 CAPSULE ORAL 3 TIMES DAILY
Qty: 90 CAPSULE | Refills: 0 | Status: SHIPPED | OUTPATIENT
Start: 2025-07-08

## 2025-07-08 RX ADMIN — GABAPENTIN 100 MG: 100 CAPSULE ORAL at 08:25

## 2025-07-08 RX ADMIN — ZOLPIDEM TARTRATE 5 MG: 5 TABLET, FILM COATED ORAL at 02:07

## 2025-07-08 RX ADMIN — METHYLPREDNISOLONE 4 MG: 4 TABLET ORAL at 08:25

## 2025-07-08 RX ADMIN — METHOCARBAMOL 500 MG: 500 TABLET ORAL at 08:25

## 2025-07-08 RX ADMIN — ACETAMINOPHEN 1000 MG: 500 TABLET ORAL at 08:25

## 2025-07-08 ASSESSMENT — ACTIVITIES OF DAILY LIVING (ADL)
ADLS_ACUITY_SCORE: 43
ADLS_ACUITY_SCORE: 41
ADLS_ACUITY_SCORE: 43
ADLS_ACUITY_SCORE: 43
ADLS_ACUITY_SCORE: 41

## 2025-07-08 NOTE — TELEPHONE ENCOUNTER
Patient has appointment with PCP on 7/16/25 for her annual physical, this has been pre-scheduled since January.    Can patient complete her hospital follow up at the same time as her physical? This is scheduled within recommended time frame (14 days) from discharge.  Or does patient need to schedule a separate appointment for this?    Routing to provider to please advise.      Leydi Mixon RN  Clinical Triage/Primary Care  [facility]

## 2025-07-08 NOTE — PLAN OF CARE
5MS Admission Note    Reason for admission: Lower extremity pain.   Primary team notified of pt arrival.  Admitted from: [facility].   Via: Cart  Accompanied by: Transport  Belongings: With patient.   Admission Required Doc Completed: No  Mobility Devices Utilized by Patient Provided (i.e. walker, wheelchair, etc.): Yes  Teaching: Orientation to unit and call light- call light within reach, use of console, meal times, when to call for the RN, and enforced importance of safety.  IV Access: No   Telemetry: No  Ht./Wt.: Completed  Code Status verified on armband: Yes  2 RN Skin Assessment Completed with: Not done.   Suction/Ambu bag/Flowmeter at bedside: Yes    Pt status:     Temp:  [98.2  F (36.8  C)-98.4  F (36.9  C)] 98.4  F (36.9  C)  Pulse:  [48-63] 48  Resp:  [16] 16  BP: (109-120)/(56-72) 120/72  SpO2:  [98 %-99 %] 99 %

## 2025-07-08 NOTE — PROGRESS NOTES
Alert & oriented x4, very upset this AM and requesting to be discharged. Up ad peggy in room. Voiding spontaneously, reported LBM 7/7. Denies CP, SOB or respiratory distress. No skin issues noted. Declined pain or discomfort. MD and CN at bedside at beginning of shift and aware of situation.  DISCHARGE SUMMARY    Pt discharging to: home  Transportation: medical transportation   AVS given and discussed: Pt was given AVS and pt states understanding of content. Pt has no further questions.   Medications given: Yes, discussed. No further questions.   Belongings returned: Yes, ensured all belongings packed and sent with pt. No items in security.   Comments: Escorted safely to elevators. Pt left at 0910

## 2025-07-08 NOTE — H&P
[facility]    History and Physical - Hospitalist Service, GOLD TEAM        Date of Admission:  7/7/2025    Assessment & Plan      Cruz Obregon is a 39 year old female admitted on 7/7/2025.  She has no significant past medical history presenting with acute on chronic lower back pain with new onset left lower extremity pain with leg numbness.  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    # Chronic lower back pain  # Lumbar radiculopathy  # Left paracentral disc protrusion at L5-S1   -- Acute on chronic lower back pain  -- Now presenting with new onset left lower extremity pain with numbness.  -- MRI in the ED shows  IMPRESSION:  1.  Left paracentral disc protrusion at L5-S1, contacting the traversing left S1 nerve root.  2.  Additional mild multilevel lumbar spondylosis, not contributing to significant spinal canal or neural foraminal stenosis, as above.  -- No motor weakness reported, no bowel/bladder dysfunction at this time  -- Neurosurgery consulted by the emergency room, pending final recommendations.  -- Pain control thus far in the emergency room, Medrol pack,   --Along with conservative pain management will add gabapentin, titrate as tolerated for neuropathic pain.  -- Consultation to physical therapy.  -- Consider Robaxin if symptoms not improving.  -- Monitor for red flag symptoms Saddle anesthesia, Progressive weakness, Bowel/bladder incontinence              Diet:  Regular diet  DVT Prophylaxis: Pneumatic Compression Devices  Spears Catheter: Not present  Lines: None     Cardiac Monitoring: None  Code Status:  Full code    Clinically Significant Risk Factors Present on Admission                                        Disposition Plan     Medically Ready for Discharge: Anticipated Tomorrow           Elaine Tomas MD  Hospitalist Service, GOLD TEAM   [facility]  Securely  message with Lopez (more info)  Text page via [facility] Paging/Directory   See signed in provider for up to date coverage information    ______________________________________________________________________    Chief Complaint   Lower back pain    History is obtained from the patient    History of Present Illness   Cruz Obregon is a 39 year old female admitted on 7/7/2025.  She has no significant past medical history presenting with acute on chronic lower back pain with new onset left lower extremity pain with leg numbness.  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    On further history patient denies any acute symptoms including headache lightheadedness dizziness chest pain or shortness of breath.  Denies any recent acute illnesses, recent travel or ill contact.  Patient states her pain as usual was in her lower back and normal able to tolerate at home, last 3 days it has been unbearable 10 out of 10 and radiating to her left leg with numbness.      Past Medical History    Past Medical History:   Diagnosis Date    Abnormal Pap smear of cervix 11/17/2010 2010, 2012    Cervical high risk HPV (human papillomavirus) test positive     2012, 2021    Chronic daily headache 10/27/2011    H/O colposcopy with cervical biopsy 01/30/2012 2012, 2013    Heart disease     Heart Murmur    Heart murmur     Mitral regurg    Mild Mitral insufficiency 01/04/2011    Mild Tricuspid insufficiency 01/04/2011    Narcolepsy     Diagnosed by sleep specialist based In [place]    Oligohydramnios - delivered 2009    induced @ 37 weeks    Unspecified hemorrhoids without mention of complication        Past Surgical History   Past Surgical History:   Procedure Laterality Date    NO HISTORY OF SURGERY         Prior to Admission Medications   Prior to Admission Medications   Prescriptions Last Dose Informant Patient Reported? Taking?   Loratadine (CLARITIN PO)   Yes No   SUMAtriptan (IMITREX) 25 MG tablet    No No   Sig: Take 1 tablet (25 mg) by mouth at onset of headache for migraine 1-2 tablets (25-50 mg) by mouth at onset of headache for migraine. May repeat in 2 hours. Max 8 tablets/24hours   Patient not taking: Reported on 4/16/2025   VENTOLIN  (90 Base) MCG/ACT inhaler   Yes No   Sig: INHALE 2 PUFFS BY MOUTH EVERY 4 HOURS AS NEEDED FOR SHORTNESS OF BREATH OR WHEEZING OR COUGH   fluticasone (FLONASE) 50 MCG/ACT nasal spray   No No   Sig: Spray 2 sprays into both nostrils daily   medroxyPROGESTERone (DEPO-PROVERA) 150 MG/ML IM injection   No No   Sig: Inject 1 mL (150 mg) into the muscle every 3 months   olopatadine (PATADAY) 0.2 % ophthalmic solution   No No   Sig: Place 0.05 mLs (1 drop) into both eyes daily.   polyethylene glycol-propylene glycol (SYSTANE ULTRA) 0.4-0.3 % SOLN ophthalmic solution   No No   Sig: Place 1 drop into both eyes 4 times daily.   tiZANidine (ZANAFLEX) 2 MG tablet 7/6/2025  No Yes   Sig: Take 1 tablet (2 mg) by mouth 3 times daily as needed for muscle spasms   zolpidem (AMBIEN) 5 MG tablet   No No   Sig: TAKE 1 TABLET(5 MG) BY MOUTH EVERY EVENING AS NEEDED FOR SLEEP      Facility-Administered Medications Last Administration Doses Remaining   medroxyPROGESTERone (DEPO-PROVERA) injection 150 mg 7/1/2025 11:15 AM 2           Review of Systems    The 10 point Review of Systems is negative other than noted in the HPI or here.     Social History   I have reviewed this patient's social history and updated it with pertinent information if needed.  Social History     Tobacco Use    Smoking status: Never    Smokeless tobacco: Current   Vaping Use    Vaping status: Never Used   Substance Use Topics    Alcohol use: No     Alcohol/week: 0.0 standard drinks of alcohol    Drug use: No         Family History   I have reviewed this patient's family history and updated it with pertinent information if needed.  Family History   Problem Relation Age of Onset    Asthma Mother     Alcohol/Drug Mother      Allergies Mother     Cerebrovascular Disease Father     Hypertension Father     Gastrointestinal Disease Father     Alcohol/Drug Father     Lipids Father     Hearing Loss Father     Neurologic Disorder Father         migraine    Diabetes Maternal Grandmother     Cerebrovascular Disease Maternal Grandmother     Alzheimer Disease Maternal Grandfather     Neurologic Disorder Paternal Grandmother         migraine    Cancer Paternal Grandfather     Asthma Sister     Cardiovascular Other     Thyroid Disease No family hx of     Glaucoma No family hx of     Macular Degeneration No family hx of          Allergies   Allergies   Allergen Reactions    Sulfa Antibiotics Rash    Sulfamethoxazole Rash        Physical Exam   Vital Signs: Temp: 98.2  F (36.8  C) Temp src: Oral BP: 109/56 Pulse: 60   Resp: 16 SpO2: 98 % O2 Device: None (Room air)    Weight: 0 lbs 0 oz    General Appearance: Appears comfortable.  Respiratory: Without wheezes rhonchi or rales.  CTA  Cardiovascular: RRR, without murmurs  GI: Soft, nontender, plus BS  Skin: Without obvious bleeding, bruising or excoriations      Medical Decision Making       79 MINUTES SPENT BY ME on the date of service doing chart review, history, exam, documentation & further activities per the note.      Data   ------------------------- PAST 24 HR DATA REVIEWED -----------------------------------------------

## 2025-07-08 NOTE — TELEPHONE ENCOUNTER
Hospital Follow Up  Obs Admission date 7/7/25  - Episode created     Diagnosis   # Chronic lower back pain  # Lumbar radiculopathy  # Left paracentral disc protrusion at L5-S1    Medications   acetaminophen (TYLENOL)  gabapentin (NEURONTIN)  ibuprofen (ADVIL/MOTRIN)  lidocaine (LIDODERM)  methylPREDNISolone (MEDROL DOSEPAK)  ondansetron (ZOFRAN ODT)    Follow Up instructions   ADULT [facility] Specialty Follow-up and recommended labs and tests  See neurosurgery in 4-6 weeks  Appointments on [place] and/or [facility] (with [facility] or [facility] provider or service). Call [phone] if you haven't heard regarding these appointments within 7 days of discharge.  Hospital Follow-up with Existing Primary Care Provider (PCP)  Schedule Primary Care visit within: 14 Days    Pamela Vaughn RN, BSN, PHN  [facility]

## 2025-07-08 NOTE — TELEPHONE ENCOUNTER
Transitions of Care Outreach  Chief Complaint   Patient presents with    Hospital F/U       Most Recent Admission Date: 7/7/2025   Most Recent Admission Diagnosis: Lumbar radiculopathy - M54.16  Encounter for surveillance of injectable contraceptive - Z30.42     Most Recent Discharge Date: 7/8/2025   Most Recent Discharge Diagnosis: Lumbar radiculopathy - M54.16  Encounter for surveillance of injectable contraceptive - Z30.42  Acute left-sided low back pain with left-sided sciatica - M54.42     Transitions of Care Assessment    Discharge Assessment  How are you doing now that you are home?: Better  How are your symptoms? (Red Flag symptoms escalate to triage hotline per guidelines): Improved  Do you know how to contact your clinic care team if you have future questions or changes to your health status? : Yes  Does the patient have their discharge instructions? : Yes  Does the patient have questions regarding their discharge instructions? : No  Were you started on any new medications or were there changes to any of your previous medications? : Yes  Does the patient have all of their medications?: Yes  Do you have questions regarding any of your medications? : No  Do you have all of your needed medical supplies or equipment (DME)?  (i.e. oxygen tank, CPAP, cane, etc.): Yes    Follow up Plan     Discharge Follow-Up  Discharge follow up appointment scheduled in alignment with recommended follow up timeframe or Transitions of Risk Category? (Low = within 30 days; Moderate= within 14 days; High= within 7 days): Yes  Discharge Follow Up Appointment Date: 07/16/25  Discharge Follow Up Appointment Scheduled with?: Primary Care Provider    Future Appointments   Date Time Provider Department Center   7/16/2025  1:40 PM Lisa Montana MD [facility]   9/16/2025 11:00 AM CHIQUITA MORA/LPN [facility]       Outpatient Plan as outlined on AVS reviewed with patient.    For any urgent concerns, please contact  our 24 hour nurse triage line: [phone] ([phone])         Leydi Mixon RN  Clinical Triage/Primary Care  [facility]

## 2025-07-08 NOTE — DISCHARGE SUMMARY
[facility]  Hospitalist Discharge Summary      Date of Admission:  7/7/2025  Date of Discharge:  7/8/2025  9:27 AM  Discharging Provider: Abbey Harper Mai, MD  Discharge Service: Hospitalist Service, GOLD TEAM 21    Discharge Diagnoses   Acute on chronic lower back pain   Left paracentral disc protrusion    Clinically Significant Risk Factors          Follow-ups Needed After Discharge   Follow-up Appointments       ADULT [facility] Specialty Follow-up and recommended labs and tests      See neurosurgery in 4-6 weeks    Appointments on [place] and/or [facility] (with [facility] or [facility] provider or service). Call [phone] if you haven't heard regarding these appointments within 7 days of discharge.        Hospital Follow-up with Existing Primary Care Provider (PCP)          Schedule Primary Care visit within: 14 Days             Unresulted Labs Ordered in the Past 30 Days of this Admission       No orders found for last 31 day(s).        These results will be followed up by     Discharge Disposition   Discharged to home  Condition at discharge: Stable    Hospital Course      Cruz Obregon is a 39 year old female admitted on 7/7/2025.  She has no significant past medical history presenting with acute on chronic lower back pain with new onset left lower extremity pain with leg numbness.      # Acute on Chronic lower back pain  # Lumbar radiculopathy  # Left paracentral disc protrusion at L5-S1   MRI shows Left paracentral disc protrusion at L5-S1, contacting the traversing left S1 nerve root.  No motor weakness reported, no bowel/bladder dysfunction at this time. Neurosurgery and recommend medrol dose pack and pain management. To follow up with NSGY in clinic. They had recommended PT/OT consult but patient became very frustrated with staff in her short stay and demanded to discharge prior to formal PT/OT. She was able to ambulate independently with improved pain on  discharge and was amenable to discharge home with medrol pack, gabapentin, lidocaine patch, tylenol and ibuprofen. Counseled on return precautions including saddle anesthesia, Progressive weakness, Bowel/bladder incontinence      Consultations This Hospital Stay   NEUROSURGERY ADULT IP CONSULT  PHYSICAL THERAPY ADULT IP CONSULT    Code Status   Prior    Time Spent on this Encounter   Abbey SAAVEDRA Mai, MD, personally saw the patient today and spent greater than 30 minutes discharging this patient.       Abbey Harper Mai, MD  [facility] SUR[facility]  [address] [place] [address]  Phone: [phone]  Fax: [phone]  ______________________________________________________________________    Physical Exam   Vital Signs: Temp: 98.4  F (36.9  C) Temp src: Oral BP: 120/72 Pulse: (!) 48   Resp: 16 SpO2: 99 % O2 Device: None (Room air)    Weight: 0 lbs 0 oz  Constitutional: awake, alert, cooperative, no apparent distress, and appears stated age  Respiratory: No increased work of breathing, good air exchange, clear to auscultation bilaterally, no crackles or wheezing  Cardiovascular: Normal apical impulse, regular rate and rhythm, normal S1 and S2, no S3 or S4, and no murmur noted  GI: No scars, normal bowel sounds, soft, non-distended, non-tender, no masses palpated, no hepatosplenomegally  Musculoskeletal: no lower extremity pitting edema present  full range of motion noted  tone is normal       Primary Care Physician   Lisa Carney    Discharge Orders      Reason for your hospital stay    You were in the hospital for pain management from your L5-S1 disc protrusion impinging on a nerve (shown on MRI). You were started on steroids to help with the inflammation. You can take gabapentin for the nerve pain, lidocaine, tylenol, ibuprofen for pain. You should see neurosurgery in clinic in 4-6 weeks     Activity    Your activity upon discharge: activity as tolerated     ADULT [facility]  Specialty Follow-up and recommended labs and tests    See neurosurgery in 4-6 weeks    Appointments on [place] and/or [facility] (with [facility] or [facility] provider or service). Call [phone] if you haven't heard regarding these appointments within 7 days of discharge.     Diet    Follow this diet upon discharge: Current Diet:Orders Placed This Encounter      Regular Diet Adult     Hospital Follow-up with Existing Primary Care Provider (PCP)            Significant Results and Procedures   Results for orders placed or performed during the hospital encounter of 07/07/25   Lumbar spine MRI w/o contrast    Narrative    EXAM: MR LUMBAR SPINE W/O CONTRAST  LOCATION: [facility]  DATE: 7/7/2025    INDICATION: increased lumbar back pain, new left lower extremity numbness and right foot numbness  COMPARISON: None.  TECHNIQUE: Routine Lumbar Spine MRI without IV contrast.    FINDINGS:   Nomenclature is based on 5 lumbar type vertebral bodies. Alignment of the lumbar spine is normal. Lumbar vertebral body heights are maintained. Mild Modic type I endplate changes at L5-S1. Normal distal spinal cord and cauda equina with conus medullaris   at L2. No significant extraspinal abnormality. Unremarkable visualized bony pelvis.    T12-L1: Normal disc height and signal. Normal facets. No significant spinal canal or neural foraminal stenosis.     L1-L2: Normal disc height and signal. Normal facets. No significant spinal canal or neural foraminal stenosis.    L2-L3: Normal disc height and signal. Normal facets. No significant spinal canal or neural foraminal stenosis.     L3-L4: Normal disc height and signal. Mild disc bulge. Mild facet hypertrophy. No significant spinal canal or neural foraminal stenosis.    L4-L5: Normal disc height and signal. Mild disc bulge. Mild facet hypertrophy. No significant spinal canal stenosis. No significant neural foraminal stenosis.    L5-S1: Mild loss of  disc height and signal. Mild disc bulge with superimposed central/left paracentral disc protrusion and associated annular fissure. This contacts the traversing left S1 nerve root. Mild facet hypertrophy. No significant spinal canal   stenosis. No significant neural foraminal stenosis.      Impression    IMPRESSION:  1.  Left paracentral disc protrusion at L5-S1, contacting the traversing left S1 nerve root.  2.  Additional mild multilevel lumbar spondylosis, not contributing to significant spinal canal or neural foraminal stenosis, as above.       Discharge Medications      Review of your medicines        START taking        Dose / Directions   acetaminophen 500 MG tablet  Commonly known as: TYLENOL  Used for: Acute left-sided low back pain with left-sided sciatica      Dose: 1,000 mg  Take 2 tablets (1,000 mg) by mouth every 6 hours as needed for mild pain.  Quantity: 90 tablet  Refills: 0     gabapentin 100 MG capsule  Commonly known as: NEURONTIN  Used for: Acute left-sided low back pain with left-sided sciatica      Dose: 100 mg  Take 1 capsule (100 mg) by mouth 3 times daily.  Quantity: 90 capsule  Refills: 0     ibuprofen 600 MG tablet  Commonly known as: ADVIL/MOTRIN  Used for: Acute left-sided low back pain with left-sided sciatica      Dose: 600 mg  Take 1 tablet (600 mg) by mouth every 6 hours as needed for moderate pain.  Quantity: 90 tablet  Refills: 0     lidocaine 5 % patch  Commonly known as: LIDODERM  Used for: Acute left-sided low back pain with left-sided sciatica      Dose: 1 patch  Place 1 patch over 12 hours onto the skin every 24 hours. To prevent lidocaine toxicity, patient should be patch free for 12 hrs daily.  Quantity: 30 patch  Refills: 0     methylPREDNISolone 4 MG tablet therapy pack  Commonly known as: MEDROL DOSEPAK  Used for: Acute left-sided low back pain with left-sided sciatica      Follow Package Directions  Quantity: 21 tablet  Refills: 0     ondansetron 4 MG ODT tab  Commonly  known as: ZOFRAN ODT  Used for: Acute left-sided low back pain with left-sided sciatica      Dose: 4 mg  Take 1 tablet (4 mg) by mouth every 6 hours as needed.  Quantity: 20 tablet  Refills: 0            CHANGE how you take these medications        Dose / Directions   CLARITIN PO  This may have changed:   when to take this  reasons to take this      Refills: 0     fluticasone 50 MCG/ACT nasal spray  Commonly known as: FLONASE  This may have changed:   when to take this  reasons to take this  Used for: Seasonal allergic rhinitis due to pollen      Dose: 2 spray  Spray 2 sprays into both nostrils daily  Quantity: 16 g  Refills: 11            CONTINUE these medicines which have NOT CHANGED        Dose / Directions   medroxyPROGESTERone 150 MG/ML IM injection  Commonly known as: DEPO-PROVERA  Used for: Depo-Provera contraceptive status      Dose: 150 mg  Inject 1 mL (150 mg) into the muscle every 3 months  Quantity: 1 mL  Refills: 3     olopatadine 0.2 % ophthalmic solution  Commonly known as: Pataday  Used for: Allergic conjunctivitis of both eyes      Dose: 1 drop  Place 0.05 mLs (1 drop) into both eyes daily.  Quantity: 2.5 mL  Refills: 11     SUMAtriptan 25 MG tablet  Commonly known as: IMITREX  Used for: Migraine without aura and without status migrainosus, not intractable      Dose: 25 mg  Take 1 tablet (25 mg) by mouth at onset of headache for migraine 1-2 tablets (25-50 mg) by mouth at onset of headache for migraine. May repeat in 2 hours. Max 8 tablets/24hours  Quantity: 9 tablet  Refills: 1     Systane Ultra 0.4-0.3 % Soln ophthalmic solution  Used for: Dry eye syndrome of both eyes  Generic drug: polyethylene glycol-propylene glycol      Dose: 1 drop  Place 1 drop into both eyes 4 times daily.  Quantity: 6 mL  Refills: 12     tiZANidine 2 MG tablet  Commonly known as: ZANAFLEX  Used for: Tension headache      Dose: 2 mg  Take 1 tablet (2 mg) by mouth 3 times daily as needed for muscle spasms  Quantity: 40  tablet  Refills: 1     Ventolin  (90 Base) MCG/ACT inhaler  Generic drug: albuterol      INHALE 2 PUFFS BY MOUTH EVERY 4 HOURS AS NEEDED FOR SHORTNESS OF BREATH OR WHEEZING OR COUGH  Refills: 0     zolpidem 5 MG tablet  Commonly known as: AMBIEN  Used for: Primary insomnia      TAKE 1 TABLET(5 MG) BY MOUTH EVERY EVENING AS NEEDED FOR SLEEP  Quantity: 30 tablet  Refills: 5               Where to get your medicines        These medications were sent to [id number] - [place] - [address] AT [facility] OF  [address]  [address] [place] [address]      Phone: [phone]   acetaminophen 500 MG tablet  gabapentin 100 MG capsule  ibuprofen 600 MG tablet  lidocaine 5 % patch  methylPREDNISolone 4 MG tablet therapy pack  ondansetron 4 MG ODT tab       Allergies   Allergies   Allergen Reactions    Sulfa Antibiotics Rash    Sulfamethoxazole Rash

## 2025-07-08 NOTE — PLAN OF CARE
Goal Outcome Evaluation:      Plan of Care Reviewed With: patient    Overall Patient Progress: no changeOverall Patient Progress: no change     A&O x4, on RA.  Pain rated 8/10.  Denies SOB, Chest pain, N/V.  Slept most of the night.  No acute events overnight.  Able to make needs known.  Independent in room with walker.  Had a BM overnight.  POC continues.    Pt refused morning labs

## 2025-07-10 ENCOUNTER — TELEPHONE (OUTPATIENT)
Dept: NEUROSURGERY | Facility: CLINIC | Age: 40
End: 2025-07-10
Payer: COMMERCIAL

## 2025-07-15 NOTE — TELEPHONE ENCOUNTER
SPINE PATIENTS - NEW PROTOCOL PREVISIT    REASON FOR VISIT: Acute on Chronic lower back pain/ Lumbar radiculopathy/ Left paracentral disc protrusion at L5-S1   PROVIDER: Selene Osorio APRN CNP   DATE OF APPT: 8/7/25    NOTES (FOR ALL VISITS) STATUS DETAILS   OFFICE NOTE from referring provider Internal Hosp Referral    HOSPITAL ENCOUNTERS Internal 7/7/25-7/8/25 Abbey Rolon MD @[facility]     MEDICATION LIST Internal    IMAGING  (FOR ALL VISITS)     MRI (HEAD, NECK, SPINE) Internal [facility]  7/7/25 MR Lumbar Spine

## 2025-07-16 ENCOUNTER — OFFICE VISIT (OUTPATIENT)
Dept: FAMILY MEDICINE | Facility: CLINIC | Age: 40
End: 2025-07-16
Payer: COMMERCIAL

## 2025-07-16 VITALS
HEART RATE: 62 BPM | HEIGHT: 66 IN | TEMPERATURE: 97.9 F | DIASTOLIC BLOOD PRESSURE: 78 MMHG | OXYGEN SATURATION: 99 % | RESPIRATION RATE: 12 BRPM | WEIGHT: 148 LBS | SYSTOLIC BLOOD PRESSURE: 119 MMHG | BODY MASS INDEX: 23.78 KG/M2

## 2025-07-16 DIAGNOSIS — M54.50 ACUTE MIDLINE LOW BACK PAIN WITHOUT SCIATICA: ICD-10-CM

## 2025-07-16 DIAGNOSIS — Z13.9 ENCOUNTER FOR SCREENING INVOLVING SOCIAL DETERMINANTS OF HEALTH (SDOH): ICD-10-CM

## 2025-07-16 DIAGNOSIS — Z00.00 ROUTINE GENERAL MEDICAL EXAMINATION AT A HEALTH CARE FACILITY: Primary | ICD-10-CM

## 2025-07-16 PROCEDURE — 1111F DSCHRG MED/CURRENT MED MERGE: CPT | Performed by: FAMILY MEDICINE

## 2025-07-16 PROCEDURE — 99395 PREV VISIT EST AGE 18-39: CPT | Performed by: FAMILY MEDICINE

## 2025-07-16 PROCEDURE — 1125F AMNT PAIN NOTED PAIN PRSNT: CPT | Performed by: FAMILY MEDICINE

## 2025-07-16 PROCEDURE — 3074F SYST BP LT 130 MM HG: CPT | Performed by: FAMILY MEDICINE

## 2025-07-16 PROCEDURE — 3078F DIAST BP <80 MM HG: CPT | Performed by: FAMILY MEDICINE

## 2025-07-16 SDOH — HEALTH STABILITY: PHYSICAL HEALTH: ON AVERAGE, HOW MANY MINUTES DO YOU ENGAGE IN EXERCISE AT THIS LEVEL?: 30 MIN

## 2025-07-16 SDOH — HEALTH STABILITY: PHYSICAL HEALTH: ON AVERAGE, HOW MANY DAYS PER WEEK DO YOU ENGAGE IN MODERATE TO STRENUOUS EXERCISE (LIKE A BRISK WALK)?: 6 DAYS

## 2025-07-16 ASSESSMENT — SOCIAL DETERMINANTS OF HEALTH (SDOH): HOW OFTEN DO YOU GET TOGETHER WITH FRIENDS OR RELATIVES?: ONCE A WEEK

## 2025-07-16 ASSESSMENT — PAIN SCALES - GENERAL: PAINLEVEL_OUTOF10: SEVERE PAIN (7)

## 2025-07-16 NOTE — PROGRESS NOTES
5Preventive Care Visit  [facility]  Lisa Carney MD, Family Medicine  Jul 16, 2025      Assessment & Plan     Routine general medical examination at a health care facility  Routine preventive reviewed, vaccines refused    Encounter for screening involving social determinants of health (SDoH)  CC referral for financial and housing concerns.  - Primary Care - Care Coordination Referral; Future    Acute midline low back pain without sciatica  Continue with chiropractor and therapy.  Take gabapentin and tizandidine.    MED REC REQUIRED  Post Medication Reconciliation Status:  Discharge medications reconciled and changed, see notes/orders  Counseling  Appropriate preventive services were addressed with this patient via screening, questionnaire, or discussion as appropriate for fall prevention, nutrition, physical activity, Tobacco-use cessation, social engagement, weight loss and cognition.  Checklist reviewing preventive services available has been given to the patient.  Reviewed patient's diet, addressing concerns and/or questions.   She is at risk for psychosocial distress and has been provided with information to reduce risk.   Reviewed preventive health counseling, as reflected in patient instructions    Follow-up    Follow-up Visit   Expected date:  Jul 16, 2026 (Approximate)      Follow Up Appointment Details:     Follow-up with whom?: PCP    Follow-Up for what?: Adult Preventive    How?: In Person                 Moises Ramesh is a 39 year old, presenting for the following:  Physical        7/16/2025     1:19 PM   Additional Questions   Roomed by Jackeline   Accompanied by Self         7/16/2025     1:20 PM   Patient Reported Additional Medications   Patient reports taking the following new medications Yes          Healthy Habits:     Getting at least 3 servings of Calcium per day:  Yes    Bi-annual eye exam:  Yes    Dental care twice a year:  Yes    Sleep apnea or  symptoms of sleep apnea:  Sleep apnea    Diet:  Regular (no restrictions)    Frequency of exercise:  None    Duration of exercise:  N/A    Barriers to taking medications:  Not applicable    Medication side effects:  Not applicable    Additional concerns today:  No    7/6/25 was in bed all day and went to bed with some back discomfort.  7/7/25 woke with more severe back, legs and arms.  Was having trouble moving and turning in bed due to pain.  She got some heat back and took a muscle relaxer.  She was pain with movement and standing, left leg numbness and right foot numbness so seen in ED.  Had testing and MRI and medication.  She was admitted because she couldn't walk and her pain wasn't controlled. She was placed on gabapentin and steroids and then was discharged the next day.  She continued the prednisone and gabapentin for 3 days then saw her chiropractor on 7/11.  She finished the steroid, isn't taking the gabapentin and using tizanidine as needed. She is starting PT as ordered by her chiropractor..          Advance Care Planning    Document on file is a Health Care Directive or POLST.        7/16/2025   General Health   How would you rate your overall physical health? (!) FAIR   Feel stress (tense, anxious, or unable to sleep) Very much   (!) STRESS CONCERN      7/16/2025   Nutrition   Three or more servings of calcium each day? Yes   Diet: Regular (no restrictions)   How many servings of fruit and vegetables per day? (!) 2-3   How many sweetened beverages each day? (!) 2         7/16/2025   Exercise   Days per week of moderate/strenous exercise 6 days   Average minutes spent exercising at this level 30 min         7/16/2025   Social Factors   Frequency of gathering with friends or relatives Once a week   Worry food won't last until get money to buy more Yes   Food not last or not have enough money for food? Yes   Do you have housing? (Housing is defined as stable permanent housing and does not include staying  outside in a car, in a tent, in an abandoned building, in an overnight shelter, or couch-surfing.) Yes   Are you worried about losing your housing? Yes   Lack of transportation? Yes   Unable to get utilities (heat,electricity)? No   Want help with housing or utility concern? (!) YES   (!) FOOD SECURITY CONCERN PRESENT (!) TRANSPORTATION CONCERN PRESENT(!) HOUSING CONCERN PRESENT      7/16/2025   Dental   Dentist two times every year? Yes         Today's PHQ-2 Score:       7/16/2025     1:14 PM   PHQ-2 ( 1999 [employer])   Q1: Little interest or pleasure in doing things 1   Q2: Feeling down, depressed or hopeless 1   PHQ-2 Score 2    Q1: Little interest or pleasure in doing things Several days   Q2: Feeling down, depressed or hopeless Several days   PHQ-2 Score 2       Patient-reported           7/16/2025   Substance Use   Alcohol more than 3/day or more than 7/wk Not Applicable   Do you use any other substances recreationally? (!) CANNABIS PRODUCTS     Social History     Tobacco Use    Smoking status: Never    Smokeless tobacco: Current   Vaping Use    Vaping status: Never Used   Substance Use Topics    Alcohol use: No     Alcohol/week: 0.0 standard drinks of alcohol    Drug use: No          Mammogram Screening - Patient under 40 years of age: Routine Mammogram Screening not recommended.         7/16/2025   STI Screening   New sexual partner(s) since last STI/HIV test? No     History of abnormal Pap smear: YES - reflected in Problem List and Health Maintenance accordingly        Latest Ref Rng & Units 7/15/2024     2:02 PM 3/2/2022     9:26 AM 1/11/2021    10:15 AM   PAP / HPV   PAP  Negative for Intraepithelial Lesion or Malignancy (NILM)  Negative for Intraepithelial Lesion or Malignancy (NILM)     HPV 16 DNA Negative Negative  Negative  Negative    HPV 18 DNA Negative Negative  Negative  Negative    Other HR HPV Negative Negative  Negative  Positive            7/16/2025   Contraception/Family Planning   Questions  "about contraception or family planning No   What are your periods like? Not currently having periods        Reviewed and updated as needed this visit by Provider   Tobacco  Allergies  Meds  Problems  Med Hx  Surg Hx  Fam Hx            Labs reviewed in EPIC      Review of Systems  Constitutional, HEENT, cardiovascular, pulmonary, gi and gu systems are negative, except as otherwise noted.     Objective    Exam  /78   Pulse 62   Temp 97.9  F (36.6  C) (Oral)   Resp 12   Ht 1.676 m (5' 6\")   Wt 67.1 kg (148 lb)   SpO2 99%   BMI 23.89 kg/m     Estimated body mass index is 23.89 kg/m  as calculated from the following:    Height as of this encounter: 1.676 m (5' 6\").    Weight as of this encounter: 67.1 kg (148 lb).    Physical Exam  GENERAL: alert and no distress  EYES: Eyes grossly normal to inspection, PERRL and conjunctivae and sclerae normal  HENT: ear canals and TM's normal, nose and mouth without ulcers or lesions  NECK: no adenopathy, no asymmetry, masses, or scars  RESP: lungs clear to auscultation - no rales, rhonchi or wheezes  CV: regular rate and rhythm, normal S1 S2, no S3 or S4, no murmur, click or rub, no peripheral edema  ABDOMEN: soft, nontender, no hepatosplenomegaly, no masses and bowel sounds normal  MS: muscular tightness of back  SKIN: no suspicious lesions or rashes  NEURO: Normal strength and tone, mentation intact and speech normal  PSYCH: mentation appears normal, affect normal/bright        Signed Electronically by: Lisa Carney MD    " hide

## 2025-07-16 NOTE — PATIENT INSTRUCTIONS
Patient Education   Preventive Care Advice   This is general advice given by our system to help you stay healthy. However, your care team may have specific advice just for you. Please talk to your care team about your preventive care needs.  Nutrition  Eat 5 or more servings of fruits and vegetables each day.  Try wheat bread, brown rice and whole grain pasta (instead of white bread, rice, and pasta).  Get enough calcium and vitamin D. Check the label on foods and aim for 100% of the RDA (recommended daily allowance).  Lifestyle  Exercise at least 150 minutes each week  (30 minutes a day, 5 days a week).  Do muscle strengthening activities 2 days a week. These help control your weight and prevent disease.  No smoking.  Wear sunscreen to prevent skin cancer.  Have a dental exam and cleaning every 6 months.  Yearly exams  See your health care team every year to talk about:  Any changes in your health.  Any medicines your care team has prescribed.  Preventive care, family planning, and ways to prevent chronic diseases.  Shots (vaccines)   HPV shots (up to age 26), if you've never had them before.  Hepatitis B shots (up to age 59), if you've never had them before.  COVID-19 shot: Get this shot when it's due.  Flu shot: Get a flu shot every year.  Tetanus shot: Get a tetanus shot every 10 years.  Pneumococcal, hepatitis A, and RSV shots: Ask your care team if you need these based on your risk.  Shingles shot (for age 50 and up)  General health tests  Diabetes screening:  Starting at age 35, Get screened for diabetes at least every 3 years.  If you are younger than age 35, ask your care team if you should be screened for diabetes.  Cholesterol test: At age 39, start having a cholesterol test every 5 years, or more often if advised.  Bone density scan (DEXA): At age 50, ask your care team if you should have this scan for osteoporosis (brittle bones).  Hepatitis C: Get tested at least once in your life.  STIs (sexually  transmitted infections)  Before age 24: Ask your care team if you should be screened for STIs.  After age 24: Get screened for STIs if you're at risk. You are at risk for STIs (including HIV) if:  You are sexually active with more than one person.  You don't use condoms every time.  You or a partner was diagnosed with a sexually transmitted infection.  If you are at risk for HIV, ask about PrEP medicine to prevent HIV.  Get tested for HIV at least once in your life, whether you are at risk for HIV or not.  Cancer screening tests  Cervical cancer screening: If you have a cervix, begin getting regular cervical cancer screening tests starting at age 21.  Breast cancer scan (mammogram): If you've ever had breasts, begin having regular mammograms starting at age 40. This is a scan to check for breast cancer.  Colon cancer screening: It is important to start screening for colon cancer at age 45.  Have a colonoscopy test every 10 years (or more often if you're at risk) Or, ask your provider about stool tests like a FIT test every year or Cologuard test every 3 years.  To learn more about your testing options, visit:   .  For help making a decision, visit:   https://bit.ly/do74606.  Prostate cancer screening test: If you have a prostate, ask your care team if a prostate cancer screening test (PSA) at age 55 is right for you.  Lung cancer screening: If you are a current or former smoker ages 50 to 80, ask your care team if ongoing lung cancer screenings are right for you.  For informational purposes only. Not to replace the advice of your health care provider. Copyright   2023 [facility]. All rights reserved. Clinically reviewed by the [facility] Transitions Program. [employer] [id number] - REV 01/24.  Learning About Stress  What is stress?     Stress is your body's response to a hard situation. Your body can have a physical, emotional, or mental response. Stress is a fact of life for most people, and it  affects everyone differently. What causes stress for you may not be stressful for someone else.  A lot of things can cause stress. You may feel stress when you go on a job interview, take a test, or run a race. This kind of short-term stress is normal and even useful. It can help you if you need to work hard or react quickly. For example, stress can help you finish an important job on time.  Long-term stress is caused by ongoing stressful situations or events. Examples of long-term stress include long-term health problems, ongoing problems at work, or conflicts in your family. Long-term stress can harm your health.  How does stress affect your health?  When you are stressed, your body responds as though you are in danger. It makes hormones that speed up your heart, make you breathe faster, and give you a burst of energy. This is called the fight-or-flight stress response. If the stress is over quickly, your body goes back to normal and no harm is done.  But if stress happens too often or lasts too long, it can have bad effects. Long-term stress can make you more likely to get sick, and it can make symptoms of some diseases worse. If you tense up when you are stressed, you may develop neck, shoulder, or low back pain. Stress is linked to high blood pressure and heart disease.  Stress also harms your emotional health. It can make you helm, tense, or depressed. Your relationships may suffer, and you may not do well at work or school.  What can you do to manage stress?  You can try these things to help manage stress:   Do something active. Exercise or activity can help reduce stress. Walking is a great way to get started. Even everyday activities such as housecleaning or yard work can help.  Try yoga or rafael chi. These techniques combine exercise and meditation. You may need some training at first to learn them.  Do something you enjoy. For example, listen to music or go to a movie. Practice your hobby or do volunteer  "work.  Meditate. This can help you relax, because you are not worrying about what happened before or what may happen in the future.  Do guided imagery. Imagine yourself in any setting that helps you feel calm. You can use online videos, books, or a teacher to guide you.  Do breathing exercises. For example:  From a standing position, bend forward from the waist with your knees slightly bent. Let your arms dangle close to the floor.  Breathe in slowly and deeply as you return to a standing position. Roll up slowly and lift your head last.  Hold your breath for just a few seconds in the standing position.  Breathe out slowly and bend forward from the waist.  Let your feelings out. Talk, laugh, cry, and express anger when you need to. Talking with supportive friends or family, a counselor, or a kathryn leader about your feelings is a healthy way to relieve stress. Avoid discussing your feelings with people who make you feel worse.  Write. It may help to write about things that are bothering you. This helps you find out how much stress you feel and what is causing it. When you know this, you can find better ways to cope.  What can you do to prevent stress?  You might try some of these things to help prevent stress:  Manage your time. This helps you find time to do the things you want and need to do.  Get enough sleep. Your body recovers from the stresses of the day while you are sleeping.  Get support. Your family, friends, and community can make a difference in how you experience stress.  Limit your news feed. Avoid or limit time on social media or news that may make you feel stressed.  Do something active. Exercise or activity can help reduce stress. Walking is a great way to get started.  Where can you learn more?  Go to https://www.VeryLastRoom.net/patiented  Enter N032 in the search box to learn more about \"Learning About Stress.\"  Current as of: October 24, 2024  Content Version: 14.5 2024-2025 [employer].   Care instructions adapted under license by your healthcare professional. If you have questions about a medical condition or this instruction, always ask your healthcare professional. [employer] disclaims any warranty or liability for your use of this information.    Substance Use Disorder: Care Instructions  Overview     You can improve your life and health by stopping your use of alcohol or drugs. When you don't drink or use drugs, you may feel and sleep better. You may get along better with your family, friends, and coworkers. There are medicines and programs that can help with substance use disorder.  How can you care for yourself at home?  Here are some ways to help you stay sober and prevent relapse.  If you have been given medicine to help keep you sober or reduce your cravings, be sure to take it exactly as prescribed.  Talk to your doctor about programs that can help you stop using drugs or drinking alcohol.  Do not keep alcohol or drugs in your home.  Plan ahead. Think about what you'll say if other people ask you to drink or use drugs. Try not to spend time with people who drink or use drugs.  Use the time and money spent on drinking or drugs to do something that's important to you.  Preventing a relapse  Have a plan to deal with relapse. Learn to recognize changes in your thinking that lead you to drink or use drugs. Get help before you start to drink or use drugs again.  Try to stay away from situations, friends, or places that may lead you to drink or use drugs.  If you feel the need to drink alcohol or use drugs again, seek help right away. Call a trusted friend or family member. Some people get support from organizations such as Narcotics Anonymous or MOG or from treatment facilities.  If you relapse, get help as soon as you can. Some people make a plan with another person that outlines what they want that person to do for them if they relapse. The plan usually includes  how to handle the relapse and who to notify in case of relapse.  Don't give up. Remember that a relapse doesn't mean that you have failed. Use the experience to learn the triggers that lead you to drink or use drugs. Then quit again. Recovery is a lifelong process. Many people have several relapses before they are able to quit for good.  Follow-up care is a key part of your treatment and safety. Be sure to make and go to all appointments, and call your doctor if you are having problems. It's also a good idea to know your test results and keep a list of the medicines you take.  When should you call for help?   Call 911  anytime you think you may need emergency care. For example, call if you or someone else:    Has overdosed or has withdrawal signs. Be sure to tell the emergency workers that you are or someone else is using or trying to quit using drugs. Overdose or withdrawal signs may include:  Losing consciousness.  Seizure.  Seeing or hearing things that aren't there (hallucinations).     Is thinking or talking about suicide or harming others.   Where to get help 24 hours a day, 7 days a week   If you or someone you know talks about suicide, self-harm, a mental health crisis, a substance use crisis, or any other kind of emotional distress, get help right away. You can:    Call the Suicide and Crisis Lifeline at 565.     Call 2-899-796-TALK (1-772.356.9925).     Text HOME to 185730 to access the Crisis Text Line.   Consider saving these numbers in your phone.  Go to [employer] for more information or to chat online.  Call your doctor now or seek immediate medical care if:    You are having withdrawal symptoms. These may include nausea or vomiting, sweating, shakiness, and anxiety.   Watch closely for changes in your health, and be sure to contact your doctor if:    You have a relapse.     You need more help or support to stop.   Where can you learn more?  Go to https://www.healthwise.net/patiented  Enter H573  "in the search box to learn more about \"Substance Use Disorder: Care Instructions.\"  Current as of: August 20, 2024  Content Version: 14.5 2024-2025 [employer].   Care instructions adapted under license by your healthcare professional. If you have questions about a medical condition or this instruction, always ask your healthcare professional. [employer] disclaims any warranty or liability for your use of this information.  At [facility], we strive to deliver an exceptional experience to you, every time we see you. If you receive a survey, please let us know what we are doing well and/or what we could improve upon, as we do value your feedback.  If you have MyChart, you can expect to receive results automatically within 24 hours of their completion.  Your provider will send a note interpreting your results as well.   If you do not have MyChart, you should receive your results in about a week by mail.    Your care team:                            Family Medicine Internal Medicine   MD Josh Liu MD Shantel Branch-Fleming, MD Srinivasa Vaka, MD Katya Belousova, PA-FIONA Shipley, BRYANT Menard MD Pediatrics   MD Nickie Curiel MD Kristen Metcalf, PAVICTORIANO Wilkins APRN CNP   MD Kim Plascencia, MD Sofie Ryder, CNP      Same-Day Provider (No follow-up visits)    ZA Silveira PA-C     Clinic hours: Monday - Thursday 7 am-6 pm; Fridays 7 am-5 pm.   Urgent care: Monday - Friday 10 am- 8 pm; Saturday and Sunday 9 am-5 pm.    Clinic: [phone]       [facility]: Monday - Thursday 8 am - 7 pm; Friday 8 am - 6 pm  [facility]: [phone]     [facility] Scheduling: Monday - Friday 7 am - 7 pm; Saturday 7 am - 3:30 pm  (528) [place] : [phone]      "

## 2025-07-17 ENCOUNTER — PATIENT OUTREACH (OUTPATIENT)
Dept: CARE COORDINATION | Facility: CLINIC | Age: 40
End: 2025-07-17
Payer: COMMERCIAL

## 2025-07-17 NOTE — PROGRESS NOTES
Community Health Worker Initial Outreach    CHW Initial Information Gathering:  Referral Source: PCP  Preferred Hospital: [facility]  [phone]  Preferred Urgent Care: [facility] - [place], [phone]  Current living arrangement:: I live in a private home  Type of residence:: Private home - stairs  Community Resources: Financial/Insurance  Supplies Currently Used at Home: None  Equipment Currently Used at Home: none  Transportation means:: Regular car  CHW Additional Questions  If ED/Hospital discharge, follow-up appointment scheduled as recommended?: N/A  Medication changes made following ED/Hospital discharge?: N/A  MyChart active?: Yes  Patient sent Social Drivers of Health questionnaire?: No    Patient accepts CC: Yes. Patient scheduled for assessment with CC  on 7/18 at 2 pm. Patient noted desire to discuss financial concerns, insurance.     NEVAEH Watkins [place]. [facility]  [phone]

## 2025-08-07 ENCOUNTER — PRE VISIT (OUTPATIENT)
Dept: NEUROSURGERY | Facility: CLINIC | Age: 40
End: 2025-08-07

## 2025-08-07 DIAGNOSIS — F51.01 PRIMARY INSOMNIA: ICD-10-CM

## 2025-08-07 RX ORDER — ZOLPIDEM TARTRATE 5 MG/1
TABLET ORAL
Qty: 30 TABLET | Refills: 2 | Status: SHIPPED | OUTPATIENT
Start: 2025-08-07